# Patient Record
Sex: MALE | Race: AMERICAN INDIAN OR ALASKA NATIVE | Employment: OTHER | URBAN - METROPOLITAN AREA
[De-identification: names, ages, dates, MRNs, and addresses within clinical notes are randomized per-mention and may not be internally consistent; named-entity substitution may affect disease eponyms.]

---

## 2024-05-18 ENCOUNTER — APPOINTMENT (OUTPATIENT)
Dept: CT IMAGING | Age: 69
DRG: 637 | End: 2024-05-18
Payer: MEDICARE

## 2024-05-18 ENCOUNTER — HOSPITAL ENCOUNTER (INPATIENT)
Age: 69
LOS: 3 days | Discharge: LEFT BEFORE DISCHARGE | DRG: 637 | End: 2024-05-21
Attending: STUDENT IN AN ORGANIZED HEALTH CARE EDUCATION/TRAINING PROGRAM | Admitting: INTERNAL MEDICINE
Payer: MEDICARE

## 2024-05-18 ENCOUNTER — APPOINTMENT (OUTPATIENT)
Dept: GENERAL RADIOLOGY | Age: 69
DRG: 637 | End: 2024-05-18
Payer: MEDICARE

## 2024-05-18 DIAGNOSIS — I10 PRIMARY HYPERTENSION: ICD-10-CM

## 2024-05-18 DIAGNOSIS — E10.10 TYPE 1 DIABETES MELLITUS WITH KETOACIDOSIS WITHOUT COMA (HCC): Primary | ICD-10-CM

## 2024-05-18 DIAGNOSIS — R79.89 ELEVATED TROPONIN: ICD-10-CM

## 2024-05-18 LAB
ACANTHOCYTES BLD QL SMEAR: ABNORMAL
ALBUMIN SERPL-MCNC: 3.6 G/DL (ref 3.5–4.6)
ALP SERPL-CCNC: 68 U/L (ref 35–104)
ALT SERPL-CCNC: 11 U/L (ref 0–41)
AMPHET UR QL SCN: NORMAL
ANION GAP SERPL CALCULATED.3IONS-SCNC: 13 MEQ/L (ref 9–15)
ANION GAP SERPL CALCULATED.3IONS-SCNC: 15 MEQ/L (ref 9–15)
ANION GAP SERPL CALCULATED.3IONS-SCNC: 20 MEQ/L (ref 9–15)
ANION GAP SERPL CALCULATED.3IONS-SCNC: 48 MEQ/L (ref 9–15)
ANION GAP SERPL CALCULATED.3IONS-SCNC: 9 MEQ/L (ref 9–15)
ANISOCYTOSIS BLD QL SMEAR: ABNORMAL
AST SERPL-CCNC: 14 U/L (ref 0–40)
B-OH-BUTYR SERPL-SCNC: 25.8 MG/DL (ref 0.2–2.8)
B-OH-BUTYR SERPL-SCNC: 84.8 MG/DL (ref 0.2–2.8)
BACTERIA URNS QL MICRO: ABNORMAL /HPF
BARBITURATES UR QL SCN: NORMAL
BASOPHILS # BLD: 0.3 K/UL (ref 0–0.2)
BASOPHILS NFR BLD: 1 %
BENZODIAZ UR QL SCN: NORMAL
BILIRUB SERPL-MCNC: 0.3 MG/DL (ref 0.2–0.7)
BILIRUB UR QL STRIP: NEGATIVE
BUN SERPL-MCNC: 22 MG/DL (ref 8–23)
BUN SERPL-MCNC: 23 MG/DL (ref 8–23)
BUN SERPL-MCNC: 24 MG/DL (ref 8–23)
BUN SERPL-MCNC: 24 MG/DL (ref 8–23)
BUN SERPL-MCNC: 33 MG/DL (ref 8–23)
BURR CELLS: ABNORMAL
CALCIUM IONIZED: 1.2 MMOL/L (ref 1.12–1.32)
CALCIUM SERPL-MCNC: 8.6 MG/DL (ref 8.5–9.9)
CALCIUM SERPL-MCNC: 8.6 MG/DL (ref 8.5–9.9)
CALCIUM SERPL-MCNC: 8.8 MG/DL (ref 8.5–9.9)
CALCIUM SERPL-MCNC: 8.8 MG/DL (ref 8.5–9.9)
CALCIUM SERPL-MCNC: 9.8 MG/DL (ref 8.5–9.9)
CANNABINOIDS UR QL SCN: NORMAL
CHLORIDE SERPL-SCNC: 107 MEQ/L (ref 95–107)
CHLORIDE SERPL-SCNC: 108 MEQ/L (ref 95–107)
CHLORIDE SERPL-SCNC: 109 MEQ/L (ref 95–107)
CHLORIDE SERPL-SCNC: 111 MEQ/L (ref 95–107)
CHLORIDE SERPL-SCNC: 80 MEQ/L (ref 95–107)
CLARITY UR: CLEAR
CO2 SERPL-SCNC: 16 MEQ/L (ref 20–31)
CO2 SERPL-SCNC: 22 MEQ/L (ref 20–31)
CO2 SERPL-SCNC: 22 MEQ/L (ref 20–31)
CO2 SERPL-SCNC: 24 MEQ/L (ref 20–31)
CO2 SERPL-SCNC: 4 MEQ/L (ref 20–31)
COCAINE UR QL SCN: NORMAL
COLOR UR: YELLOW
CREAT SERPL-MCNC: 1.27 MG/DL (ref 0.7–1.2)
CREAT SERPL-MCNC: 1.46 MG/DL (ref 0.7–1.2)
CREAT SERPL-MCNC: 1.5 MG/DL (ref 0.7–1.2)
CREAT SERPL-MCNC: 1.51 MG/DL (ref 0.7–1.2)
CREAT SERPL-MCNC: 2.13 MG/DL (ref 0.7–1.2)
DRUG SCREEN COMMENT UR-IMP: NORMAL
EOSINOPHIL # BLD: 0 K/UL (ref 0–0.7)
EOSINOPHIL NFR BLD: 0.1 %
EPI CELLS #/AREA URNS HPF: ABNORMAL /HPF
ERYTHROCYTE [DISTWIDTH] IN BLOOD BY AUTOMATED COUNT: 12.2 % (ref 11.5–14.5)
ESTIMATED AVERAGE GLUCOSE: 283 MG/DL
ETHANOL PERCENT: NORMAL G/DL
ETHANOLAMINE SERPL-MCNC: <10 MG/DL (ref 0–0.08)
FENTANYL SCREEN, URINE: NORMAL
GLOBULIN SER CALC-MCNC: 2.4 G/DL (ref 2.3–3.5)
GLUCOSE BLD-MCNC: 102 MG/DL (ref 70–99)
GLUCOSE BLD-MCNC: 106 MG/DL (ref 70–99)
GLUCOSE BLD-MCNC: 146 MG/DL (ref 70–99)
GLUCOSE BLD-MCNC: 208 MG/DL (ref 70–99)
GLUCOSE BLD-MCNC: 319 MG/DL (ref 70–99)
GLUCOSE BLD-MCNC: 357 MG/DL (ref 70–99)
GLUCOSE BLD-MCNC: 373 MG/DL (ref 70–99)
GLUCOSE BLD-MCNC: 504 MG/DL (ref 70–99)
GLUCOSE BLD-MCNC: 589 MG/DL (ref 70–99)
GLUCOSE BLD-MCNC: 92 MG/DL (ref 70–99)
GLUCOSE BLD-MCNC: 96 MG/DL (ref 70–99)
GLUCOSE BLD-MCNC: >600 MG/DL (ref 70–99)
GLUCOSE BLD-MCNC: >700 MG/DL (ref 70–99)
GLUCOSE SERPL-MCNC: 1118 MG/DL (ref 70–99)
GLUCOSE SERPL-MCNC: 1214 MG/DL (ref 70–99)
GLUCOSE SERPL-MCNC: 128 MG/DL (ref 70–99)
GLUCOSE SERPL-MCNC: 303 MG/DL (ref 70–99)
GLUCOSE SERPL-MCNC: 317 MG/DL (ref 70–99)
GLUCOSE SERPL-MCNC: 395 MG/DL (ref 70–99)
GLUCOSE SERPL-MCNC: 993 MG/DL (ref 70–99)
GLUCOSE UR STRIP-MCNC: >=1000 MG/DL
HBA1C MFR BLD: 11.5 % (ref 4–6)
HCT VFR BLD AUTO: 37 % (ref 41–53)
HCT VFR BLD AUTO: 38.7 % (ref 42–52)
HGB BLD CALC-MCNC: 12.7 GM/DL (ref 13.5–17.5)
HGB BLD-MCNC: 11.5 G/DL (ref 14–18)
HGB UR QL STRIP: ABNORMAL
KETONES UR STRIP-MCNC: 15 MG/DL
LACTATE BLDV-SCNC: 10 MMOL/L (ref 0.5–2.2)
LACTATE: 8.41 MMOL/L (ref 0.4–2)
LEUKOCYTE ESTERASE UR QL STRIP: NEGATIVE
LIPASE SERPL-CCNC: 25 U/L (ref 12–95)
LYMPHOCYTES # BLD: 0.9 K/UL (ref 1–4.8)
LYMPHOCYTES NFR BLD: 3 %
MAGNESIUM SERPL-MCNC: 1.5 MG/DL (ref 1.7–2.4)
MAGNESIUM SERPL-MCNC: 1.6 MG/DL (ref 1.7–2.4)
MAGNESIUM SERPL-MCNC: 1.7 MG/DL (ref 1.7–2.4)
MCH RBC QN AUTO: 29.3 PG (ref 27–31.3)
MCHC RBC AUTO-ENTMCNC: 29.7 % (ref 33–37)
MCV RBC AUTO: 98.5 FL (ref 79–92.2)
METHADONE UR QL SCN: NORMAL
MONOCYTES # BLD: 2.1 K/UL (ref 0.2–0.8)
MONOCYTES NFR BLD: 6.7 %
NEUTROPHILS # BLD: 26.5 K/UL (ref 1.4–6.5)
NEUTS BAND NFR BLD MANUAL: 4 %
NEUTS SEG NFR BLD: 86 %
NITRITE UR QL STRIP: NEGATIVE
OPIATES UR QL SCN: NORMAL
OXYCODONE UR QL SCN: NORMAL
PCO2 ARTERIAL: <12 MM HG (ref 35–45)
PCP UR QL SCN: NORMAL
PERFORMED ON: ABNORMAL
PERFORMED ON: NORMAL
PERFORMED ON: NORMAL
PH ARTERIAL: 7.01 (ref 7.35–7.45)
PH UR STRIP: 5 [PH] (ref 5–9)
PHOSPHATE SERPL-MCNC: 1.4 MG/DL (ref 2.3–4.8)
PHOSPHATE SERPL-MCNC: 1.6 MG/DL (ref 2.3–4.8)
PHOSPHATE SERPL-MCNC: 1.9 MG/DL (ref 2.3–4.8)
PLATELET # BLD AUTO: 329 K/UL (ref 130–400)
PLATELET BLD QL SMEAR: ADEQUATE
PO2 ARTERIAL: 145 MM HG (ref 75–108)
POC CHLORIDE: 96 MEQ/L (ref 99–110)
POC CREATININE: 1.6 MG/DL (ref 0.8–1.3)
POC SAMPLE TYPE: ABNORMAL
POTASSIUM SERPL-SCNC: 3.3 MEQ/L (ref 3.4–4.9)
POTASSIUM SERPL-SCNC: 3.4 MEQ/L (ref 3.4–4.9)
POTASSIUM SERPL-SCNC: 3.4 MEQ/L (ref 3.4–4.9)
POTASSIUM SERPL-SCNC: 3.6 MEQ/L (ref 3.4–4.9)
POTASSIUM SERPL-SCNC: 5.5 MEQ/L (ref 3.4–4.9)
POTASSIUM SERPL-SCNC: 5.7 MEQ/L (ref 3.5–5.1)
PROCALCITONIN SERPL IA-MCNC: 1.33 NG/ML (ref 0–0.15)
PROPOXYPH UR QL SCN: NORMAL
PROT SERPL-MCNC: 6 G/DL (ref 6.3–8)
PROT UR STRIP-MCNC: NEGATIVE MG/DL
RBC # BLD AUTO: 3.93 M/UL (ref 4.7–6.1)
RBC #/AREA URNS HPF: ABNORMAL /HPF (ref 0–2)
SLIDE REVIEW: ABNORMAL
SMUDGE CELLS BLD QL SMEAR: 20.2
SODIUM BLD-SCNC: 123 MEQ/L (ref 136–145)
SODIUM SERPL-SCNC: 132 MEQ/L (ref 135–144)
SODIUM SERPL-SCNC: 144 MEQ/L (ref 135–144)
SP GR UR STRIP: 1.02 (ref 1–1.03)
TROPONIN, HIGH SENSITIVITY: 149 NG/L (ref 0–19)
TROPONIN, HIGH SENSITIVITY: 30 NG/L (ref 0–19)
TSH REFLEX: 1.23 UIU/ML (ref 0.44–3.86)
URINE REFLEX TO CULTURE: ABNORMAL
UROBILINOGEN UR STRIP-ACNC: 0.2 E.U./DL
WBC # BLD AUTO: 29.4 K/UL (ref 4.8–10.8)
WBC #/AREA URNS HPF: ABNORMAL /HPF (ref 0–5)

## 2024-05-18 PROCEDURE — 84484 ASSAY OF TROPONIN QUANT: CPT

## 2024-05-18 PROCEDURE — 82077 ASSAY SPEC XCP UR&BREATH IA: CPT

## 2024-05-18 PROCEDURE — 83690 ASSAY OF LIPASE: CPT

## 2024-05-18 PROCEDURE — 36415 COLL VENOUS BLD VENIPUNCTURE: CPT

## 2024-05-18 PROCEDURE — 51702 INSERT TEMP BLADDER CATH: CPT

## 2024-05-18 PROCEDURE — 84145 PROCALCITONIN (PCT): CPT

## 2024-05-18 PROCEDURE — 96365 THER/PROPH/DIAG IV INF INIT: CPT

## 2024-05-18 PROCEDURE — 82330 ASSAY OF CALCIUM: CPT

## 2024-05-18 PROCEDURE — 6360000002 HC RX W HCPCS: Performed by: STUDENT IN AN ORGANIZED HEALTH CARE EDUCATION/TRAINING PROGRAM

## 2024-05-18 PROCEDURE — 82565 ASSAY OF CREATININE: CPT

## 2024-05-18 PROCEDURE — 96368 THER/DIAG CONCURRENT INF: CPT

## 2024-05-18 PROCEDURE — 85014 HEMATOCRIT: CPT

## 2024-05-18 PROCEDURE — 81001 URINALYSIS AUTO W/SCOPE: CPT

## 2024-05-18 PROCEDURE — 6370000000 HC RX 637 (ALT 250 FOR IP): Performed by: INTERNAL MEDICINE

## 2024-05-18 PROCEDURE — 99285 EMERGENCY DEPT VISIT HI MDM: CPT

## 2024-05-18 PROCEDURE — 71045 X-RAY EXAM CHEST 1 VIEW: CPT

## 2024-05-18 PROCEDURE — 2580000003 HC RX 258: Performed by: INTERNAL MEDICINE

## 2024-05-18 PROCEDURE — 2500000003 HC RX 250 WO HCPCS: Performed by: STUDENT IN AN ORGANIZED HEALTH CARE EDUCATION/TRAINING PROGRAM

## 2024-05-18 PROCEDURE — 80307 DRUG TEST PRSMV CHEM ANLYZR: CPT

## 2024-05-18 PROCEDURE — 83735 ASSAY OF MAGNESIUM: CPT

## 2024-05-18 PROCEDURE — 83605 ASSAY OF LACTIC ACID: CPT

## 2024-05-18 PROCEDURE — 2580000003 HC RX 258: Performed by: STUDENT IN AN ORGANIZED HEALTH CARE EDUCATION/TRAINING PROGRAM

## 2024-05-18 PROCEDURE — 2580000003 HC RX 258

## 2024-05-18 PROCEDURE — 82800 BLOOD PH: CPT

## 2024-05-18 PROCEDURE — 84132 ASSAY OF SERUM POTASSIUM: CPT

## 2024-05-18 PROCEDURE — 74177 CT ABD & PELVIS W/CONTRAST: CPT

## 2024-05-18 PROCEDURE — 87040 BLOOD CULTURE FOR BACTERIA: CPT

## 2024-05-18 PROCEDURE — 82435 ASSAY OF BLOOD CHLORIDE: CPT

## 2024-05-18 PROCEDURE — 93005 ELECTROCARDIOGRAM TRACING: CPT | Performed by: STUDENT IN AN ORGANIZED HEALTH CARE EDUCATION/TRAINING PROGRAM

## 2024-05-18 PROCEDURE — 84295 ASSAY OF SERUM SODIUM: CPT

## 2024-05-18 PROCEDURE — 82947 ASSAY GLUCOSE BLOOD QUANT: CPT

## 2024-05-18 PROCEDURE — 2500000003 HC RX 250 WO HCPCS: Performed by: INTERNAL MEDICINE

## 2024-05-18 PROCEDURE — 6370000000 HC RX 637 (ALT 250 FOR IP): Performed by: STUDENT IN AN ORGANIZED HEALTH CARE EDUCATION/TRAINING PROGRAM

## 2024-05-18 PROCEDURE — 80053 COMPREHEN METABOLIC PANEL: CPT

## 2024-05-18 PROCEDURE — 85025 COMPLETE CBC W/AUTO DIFF WBC: CPT

## 2024-05-18 PROCEDURE — 6360000004 HC RX CONTRAST MEDICATION: Performed by: STUDENT IN AN ORGANIZED HEALTH CARE EDUCATION/TRAINING PROGRAM

## 2024-05-18 PROCEDURE — 70450 CT HEAD/BRAIN W/O DYE: CPT

## 2024-05-18 PROCEDURE — 2000000000 HC ICU R&B

## 2024-05-18 PROCEDURE — 84443 ASSAY THYROID STIM HORMONE: CPT

## 2024-05-18 PROCEDURE — 6360000002 HC RX W HCPCS: Performed by: INTERNAL MEDICINE

## 2024-05-18 PROCEDURE — 83036 HEMOGLOBIN GLYCOSYLATED A1C: CPT

## 2024-05-18 PROCEDURE — 82010 KETONE BODYS QUAN: CPT

## 2024-05-18 PROCEDURE — 36600 WITHDRAWAL OF ARTERIAL BLOOD: CPT

## 2024-05-18 PROCEDURE — 84100 ASSAY OF PHOSPHORUS: CPT

## 2024-05-18 PROCEDURE — 6360000002 HC RX W HCPCS: Performed by: EMERGENCY MEDICINE

## 2024-05-18 RX ORDER — SODIUM CHLORIDE 9 MG/ML
INJECTION, SOLUTION INTRAVENOUS CONTINUOUS
Status: DISCONTINUED | OUTPATIENT
Start: 2024-05-18 | End: 2024-05-18

## 2024-05-18 RX ORDER — LISINOPRIL 10 MG/1
10 TABLET ORAL DAILY
Status: DISCONTINUED | OUTPATIENT
Start: 2024-05-19 | End: 2024-05-22 | Stop reason: HOSPADM

## 2024-05-18 RX ORDER — PANTOPRAZOLE SODIUM 40 MG/1
40 TABLET, DELAYED RELEASE ORAL
Status: DISCONTINUED | OUTPATIENT
Start: 2024-05-19 | End: 2024-05-22 | Stop reason: HOSPADM

## 2024-05-18 RX ORDER — INSULIN GLARGINE 100 [IU]/ML
20 INJECTION, SOLUTION SUBCUTANEOUS NIGHTLY
COMMUNITY
Start: 2024-04-24

## 2024-05-18 RX ORDER — DEXTROSE AND SODIUM CHLORIDE 5; .45 G/100ML; G/100ML
INJECTION, SOLUTION INTRAVENOUS CONTINUOUS PRN
Status: DISCONTINUED | OUTPATIENT
Start: 2024-05-18 | End: 2024-05-19

## 2024-05-18 RX ORDER — ONDANSETRON 2 MG/ML
4 INJECTION INTRAMUSCULAR; INTRAVENOUS ONCE
Status: COMPLETED | OUTPATIENT
Start: 2024-05-18 | End: 2024-05-18

## 2024-05-18 RX ORDER — LEVOTHYROXINE SODIUM 0.15 MG/1
1 TABLET ORAL DAILY
COMMUNITY
Start: 2024-04-24

## 2024-05-18 RX ORDER — ATORVASTATIN CALCIUM 20 MG/1
20 TABLET, FILM COATED ORAL NIGHTLY
COMMUNITY
Start: 2024-04-24

## 2024-05-18 RX ORDER — GABAPENTIN 300 MG/1
300 CAPSULE ORAL 3 TIMES DAILY
COMMUNITY
Start: 2024-04-24 | End: 2024-07-23

## 2024-05-18 RX ORDER — DEXTROSE MONOHYDRATE, SODIUM CHLORIDE, AND POTASSIUM CHLORIDE 50; 1.49; 4.5 G/1000ML; G/1000ML; G/1000ML
INJECTION, SOLUTION INTRAVENOUS CONTINUOUS PRN
Status: DISCONTINUED | OUTPATIENT
Start: 2024-05-18 | End: 2024-05-19

## 2024-05-18 RX ORDER — ASPIRIN 81 MG/1
81 TABLET ORAL DAILY
Status: DISCONTINUED | OUTPATIENT
Start: 2024-05-19 | End: 2024-05-22 | Stop reason: HOSPADM

## 2024-05-18 RX ORDER — ASPIRIN 81 MG/1
81 TABLET ORAL DAILY
COMMUNITY
Start: 2006-01-24

## 2024-05-18 RX ORDER — POTASSIUM CHLORIDE 7.45 MG/ML
10 INJECTION INTRAVENOUS PRN
Status: DISCONTINUED | OUTPATIENT
Start: 2024-05-18 | End: 2024-05-19

## 2024-05-18 RX ORDER — ONDANSETRON 2 MG/ML
4 INJECTION INTRAMUSCULAR; INTRAVENOUS EVERY 6 HOURS PRN
Status: DISCONTINUED | OUTPATIENT
Start: 2024-05-18 | End: 2024-05-22 | Stop reason: HOSPADM

## 2024-05-18 RX ORDER — LEVOTHYROXINE SODIUM 0.15 MG/1
150 TABLET ORAL DAILY
Status: DISCONTINUED | OUTPATIENT
Start: 2024-05-19 | End: 2024-05-22 | Stop reason: HOSPADM

## 2024-05-18 RX ORDER — HEPARIN SODIUM 5000 [USP'U]/ML
5000 INJECTION, SOLUTION INTRAVENOUS; SUBCUTANEOUS EVERY 8 HOURS SCHEDULED
Status: DISCONTINUED | OUTPATIENT
Start: 2024-05-18 | End: 2024-05-20 | Stop reason: DRUGHIGH

## 2024-05-18 RX ORDER — SODIUM CHLORIDE 9 MG/ML
INJECTION, SOLUTION INTRAVENOUS
Status: COMPLETED
Start: 2024-05-18 | End: 2024-05-18

## 2024-05-18 RX ORDER — MAGNESIUM SULFATE IN WATER 40 MG/ML
2000 INJECTION, SOLUTION INTRAVENOUS PRN
Status: DISCONTINUED | OUTPATIENT
Start: 2024-05-18 | End: 2024-05-19

## 2024-05-18 RX ORDER — ONDANSETRON 2 MG/ML
4 INJECTION INTRAMUSCULAR; INTRAVENOUS EVERY 6 HOURS PRN
Status: DISCONTINUED | OUTPATIENT
Start: 2024-05-18 | End: 2024-05-18 | Stop reason: SDUPTHER

## 2024-05-18 RX ORDER — 0.9 % SODIUM CHLORIDE 0.9 %
2000 INTRAVENOUS SOLUTION INTRAVENOUS ONCE
Status: COMPLETED | OUTPATIENT
Start: 2024-05-18 | End: 2024-05-18

## 2024-05-18 RX ORDER — DILTIAZEM HYDROCHLORIDE 180 MG/1
180 CAPSULE, COATED, EXTENDED RELEASE ORAL DAILY
Status: DISCONTINUED | OUTPATIENT
Start: 2024-05-19 | End: 2024-05-22 | Stop reason: HOSPADM

## 2024-05-18 RX ORDER — GABAPENTIN 300 MG/1
300 CAPSULE ORAL 3 TIMES DAILY
Status: DISCONTINUED | OUTPATIENT
Start: 2024-05-18 | End: 2024-05-20

## 2024-05-18 RX ORDER — LISINOPRIL 10 MG/1
10 TABLET ORAL DAILY
COMMUNITY
Start: 2024-04-24

## 2024-05-18 RX ORDER — DILTIAZEM HYDROCHLORIDE 180 MG/1
180 CAPSULE, EXTENDED RELEASE ORAL DAILY
COMMUNITY
Start: 2024-04-24 | End: 2024-07-23

## 2024-05-18 RX ORDER — SODIUM CHLORIDE 9 MG/ML
INJECTION, SOLUTION INTRAVENOUS CONTINUOUS
Status: DISCONTINUED | OUTPATIENT
Start: 2024-05-18 | End: 2024-05-19

## 2024-05-18 RX ORDER — ATORVASTATIN CALCIUM 20 MG/1
20 TABLET, FILM COATED ORAL NIGHTLY
Status: DISCONTINUED | OUTPATIENT
Start: 2024-05-18 | End: 2024-05-22 | Stop reason: HOSPADM

## 2024-05-18 RX ORDER — 0.9 % SODIUM CHLORIDE 0.9 %
1000 INTRAVENOUS SOLUTION INTRAVENOUS ONCE
Status: COMPLETED | OUTPATIENT
Start: 2024-05-18 | End: 2024-05-18

## 2024-05-18 RX ADMIN — IOPAMIDOL 75 ML: 755 INJECTION, SOLUTION INTRAVENOUS at 05:07

## 2024-05-18 RX ADMIN — GABAPENTIN 300 MG: 300 CAPSULE ORAL at 21:28

## 2024-05-18 RX ADMIN — POTASSIUM CHLORIDE 10 MEQ: 7.46 INJECTION, SOLUTION INTRAVENOUS at 16:38

## 2024-05-18 RX ADMIN — SODIUM CHLORIDE 1000 ML: 9 INJECTION, SOLUTION INTRAVENOUS at 05:59

## 2024-05-18 RX ADMIN — ONDANSETRON 4 MG: 2 INJECTION INTRAMUSCULAR; INTRAVENOUS at 15:25

## 2024-05-18 RX ADMIN — SODIUM CHLORIDE 1.4 UNITS/HR: 9 INJECTION, SOLUTION INTRAVENOUS at 20:17

## 2024-05-18 RX ADMIN — SODIUM CHLORIDE 2000 ML: 9 INJECTION, SOLUTION INTRAVENOUS at 02:48

## 2024-05-18 RX ADMIN — ONDANSETRON 4 MG: 2 INJECTION INTRAMUSCULAR; INTRAVENOUS at 10:34

## 2024-05-18 RX ADMIN — MAGNESIUM SULFATE HEPTAHYDRATE 2000 MG: 40 INJECTION, SOLUTION INTRAVENOUS at 22:06

## 2024-05-18 RX ADMIN — HEPARIN SODIUM 5000 UNITS: 5000 INJECTION INTRAVENOUS; SUBCUTANEOUS at 21:28

## 2024-05-18 RX ADMIN — POTASSIUM CHLORIDE 10 MEQ: 7.46 INJECTION, SOLUTION INTRAVENOUS at 17:30

## 2024-05-18 RX ADMIN — POTASSIUM CHLORIDE 10 MEQ: 7.46 INJECTION, SOLUTION INTRAVENOUS at 23:56

## 2024-05-18 RX ADMIN — POTASSIUM CHLORIDE, DEXTROSE MONOHYDRATE AND SODIUM CHLORIDE 150 ML/HR: 150; 5; 450 INJECTION, SOLUTION INTRAVENOUS at 23:57

## 2024-05-18 RX ADMIN — SODIUM CHLORIDE: 9 INJECTION, SOLUTION INTRAVENOUS at 13:24

## 2024-05-18 RX ADMIN — SODIUM CHLORIDE 14.9 UNITS/HR: 9 INJECTION, SOLUTION INTRAVENOUS at 14:21

## 2024-05-18 RX ADMIN — PIPERACILLIN AND TAZOBACTAM 3375 MG: 3; .375 INJECTION, POWDER, FOR SOLUTION INTRAVENOUS at 06:03

## 2024-05-18 RX ADMIN — POTASSIUM CHLORIDE 10 MEQ: 7.46 INJECTION, SOLUTION INTRAVENOUS at 18:30

## 2024-05-18 RX ADMIN — SODIUM CHLORIDE 1500 MG: 9 INJECTION, SOLUTION INTRAVENOUS at 07:12

## 2024-05-18 RX ADMIN — SODIUM CHLORIDE 18.8 UNITS/HR: 9 INJECTION, SOLUTION INTRAVENOUS at 15:30

## 2024-05-18 RX ADMIN — SODIUM PHOSPHATE, MONOBASIC, MONOHYDRATE AND SODIUM PHOSPHATE, DIBASIC, ANHYDROUS 15 MMOL: 142; 276 INJECTION, SOLUTION INTRAVENOUS at 23:37

## 2024-05-18 RX ADMIN — HEPARIN SODIUM 5000 UNITS: 5000 INJECTION INTRAVENOUS; SUBCUTANEOUS at 16:42

## 2024-05-18 RX ADMIN — SODIUM CHLORIDE: 9 INJECTION, SOLUTION INTRAVENOUS at 02:57

## 2024-05-18 RX ADMIN — ATORVASTATIN CALCIUM 20 MG: 20 TABLET, FILM COATED ORAL at 21:28

## 2024-05-18 RX ADMIN — CALCIUM GLUCONATE 2000 MG: 98 INJECTION, SOLUTION INTRAVENOUS at 02:58

## 2024-05-18 RX ADMIN — POTASSIUM CHLORIDE, DEXTROSE MONOHYDRATE AND SODIUM CHLORIDE: 150; 5; 450 INJECTION, SOLUTION INTRAVENOUS at 17:48

## 2024-05-18 RX ADMIN — SODIUM CHLORIDE: 9 INJECTION, SOLUTION INTRAVENOUS at 07:17

## 2024-05-18 RX ADMIN — POTASSIUM CHLORIDE 10 MEQ: 7.46 INJECTION, SOLUTION INTRAVENOUS at 22:02

## 2024-05-18 RX ADMIN — SODIUM CHLORIDE 10.8 UNITS/HR: 9 INJECTION, SOLUTION INTRAVENOUS at 03:20

## 2024-05-18 RX ADMIN — POTASSIUM CHLORIDE 10 MEQ: 7.46 INJECTION, SOLUTION INTRAVENOUS at 22:49

## 2024-05-18 RX ADMIN — Medication 2000 ML: at 02:48

## 2024-05-18 ASSESSMENT — PAIN SCALES - GENERAL
PAINLEVEL_OUTOF10: 0

## 2024-05-18 NOTE — ED NOTES
Pt removed nasal cannula, refusing to wear it, states I just want to sleep. SPO2 100 RA, O2 removed at this time

## 2024-05-18 NOTE — ED TRIAGE NOTES
Pt arrived via EMS from home, per son altered mental status. Hx of DM. LKW 2100. Pt restless upon arrival. Given en route 1L NS, 1g Ca, 2.5mg x2 albuterol treatment.

## 2024-05-18 NOTE — ED PROVIDER NOTES
condition, pulse oximetry and review of old charts    I assumed direction of critical care for this patient from another provider in my specialty: no      Care discussed with: admitting provider             FINAL IMPRESSION      1. Type 1 diabetes mellitus with ketoacidosis without coma (HCC)          DISPOSITION/PLAN   DISPOSITION Admitted 05/18/2024 05:52:02 AM      PATIENT REFERRED TO:  No follow-up provider specified.    DISCHARGE MEDICATIONS:  New Prescriptions    No medications on file     Controlled Substances Monitoring:          No data to display                (Please note that portions of this note were completed with a voice recognition program.  Efforts were made to edit the dictations but occasionally words are mis-transcribed.)    Mary iRos MD (electronically signed)  Attending Emergency Physician            Mary Rios MD  05/18/24 0619

## 2024-05-18 NOTE — FLOWSHEET NOTE
1500- patient here in icu room 15. Patient able to answer admission questions. Patient on insulin gtt.

## 2024-05-18 NOTE — H&P
DEPARTMENT OF HOSPITAL MEDICINE    HISTORY AND PHYSICAL EXAM    PATIENT NAME:  Herminio Olguin    MRN:  [id number]  SERVICE DATE:  5/18/2024   SERVICE TIME:  5:52 AM    Primary Care Physician: Joelle Campuzano MD     SUBJECTIVE  CHIEF COMPLAINT: AMS    HPI:  Herminio Olguin is a 69 y.o. male who presents with above complaints.    Patient is a 69-year-old male brought in from home by EMS after his son found him to be confused and not acting normally at home.  Patient is a type I diabetic, not previously seen at this facility.  Review of available outside records in care everywhere seems to indicate he does have prior episodes of DKA.  Initial BG in ED higher than upper limit of detection at >700, with eventual initial laboratory value of 1118.  Patient started on fluids and insulin per DKA protocol while still in the ED.  He was also found to have significant JESSICA, clinical dehydration, and metabolic encephalopathy.  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  Repeat TSH performed in the ED at this time is WNL.  Patient is moderately persistently encephalopathic, and currently lacks medical decision-making capacity.  His adult son is present at bedside, and reportedly is next of kin and medical decision maker in this setting.  Adult son reports that he was away from home, came back today and found him the patient severely altered.  Unclear last known well.  Patient reportedly does not use an insulin pump at home.  He is able to answer very limited questions during the course of the admission interview, and denies using insulin pump or subcutaneous abdominal shots, states that he takes his shot \"in his arm\", and motions to his right deltoid.  He endorses severe thirst.  He

## 2024-05-19 LAB
ANION GAP SERPL CALCULATED.3IONS-SCNC: 10 MEQ/L (ref 9–15)
ANION GAP SERPL CALCULATED.3IONS-SCNC: 12 MEQ/L (ref 9–15)
ANION GAP SERPL CALCULATED.3IONS-SCNC: 13 MEQ/L (ref 9–15)
ANION GAP SERPL CALCULATED.3IONS-SCNC: 13 MEQ/L (ref 9–15)
BASOPHILS # BLD: 0 K/UL (ref 0–0.2)
BASOPHILS NFR BLD: 0.2 %
BUN SERPL-MCNC: 14 MG/DL (ref 8–23)
BUN SERPL-MCNC: 16 MG/DL (ref 8–23)
BUN SERPL-MCNC: 18 MG/DL (ref 8–23)
BUN SERPL-MCNC: 20 MG/DL (ref 8–23)
CALCIUM SERPL-MCNC: 8 MG/DL (ref 8.5–9.9)
CALCIUM SERPL-MCNC: 8.1 MG/DL (ref 8.5–9.9)
CALCIUM SERPL-MCNC: 8.3 MG/DL (ref 8.5–9.9)
CALCIUM SERPL-MCNC: 8.3 MG/DL (ref 8.5–9.9)
CHLORIDE SERPL-SCNC: 106 MEQ/L (ref 95–107)
CHLORIDE SERPL-SCNC: 106 MEQ/L (ref 95–107)
CHLORIDE SERPL-SCNC: 108 MEQ/L (ref 95–107)
CHLORIDE SERPL-SCNC: 108 MEQ/L (ref 95–107)
CO2 SERPL-SCNC: 19 MEQ/L (ref 20–31)
CO2 SERPL-SCNC: 19 MEQ/L (ref 20–31)
CO2 SERPL-SCNC: 21 MEQ/L (ref 20–31)
CO2 SERPL-SCNC: 22 MEQ/L (ref 20–31)
CREAT SERPL-MCNC: 0.84 MG/DL (ref 0.7–1.2)
CREAT SERPL-MCNC: 0.91 MG/DL (ref 0.7–1.2)
CREAT SERPL-MCNC: 0.95 MG/DL (ref 0.7–1.2)
CREAT SERPL-MCNC: 1 MG/DL (ref 0.7–1.2)
EOSINOPHIL # BLD: 0 K/UL (ref 0–0.7)
EOSINOPHIL NFR BLD: 0 %
ERYTHROCYTE [DISTWIDTH] IN BLOOD BY AUTOMATED COUNT: 13.2 % (ref 11.5–14.5)
GLUCOSE BLD-MCNC: 118 MG/DL (ref 70–99)
GLUCOSE BLD-MCNC: 125 MG/DL (ref 70–99)
GLUCOSE BLD-MCNC: 131 MG/DL (ref 70–99)
GLUCOSE BLD-MCNC: 135 MG/DL (ref 70–99)
GLUCOSE BLD-MCNC: 139 MG/DL (ref 70–99)
GLUCOSE BLD-MCNC: 145 MG/DL (ref 70–99)
GLUCOSE BLD-MCNC: 192 MG/DL (ref 70–99)
GLUCOSE BLD-MCNC: 215 MG/DL (ref 70–99)
GLUCOSE BLD-MCNC: 224 MG/DL (ref 70–99)
GLUCOSE BLD-MCNC: 242 MG/DL (ref 70–99)
GLUCOSE BLD-MCNC: 243 MG/DL (ref 70–99)
GLUCOSE BLD-MCNC: 262 MG/DL (ref 70–99)
GLUCOSE BLD-MCNC: 71 MG/DL (ref 70–99)
GLUCOSE BLD-MCNC: 92 MG/DL (ref 70–99)
GLUCOSE SERPL-MCNC: 143 MG/DL (ref 70–99)
GLUCOSE SERPL-MCNC: 152 MG/DL (ref 70–99)
GLUCOSE SERPL-MCNC: 215 MG/DL (ref 70–99)
GLUCOSE SERPL-MCNC: 252 MG/DL (ref 70–99)
HCT VFR BLD AUTO: 40.6 % (ref 42–52)
HGB BLD-MCNC: 13.6 G/DL (ref 14–18)
LYMPHOCYTES # BLD: 1.1 K/UL (ref 1–4.8)
LYMPHOCYTES NFR BLD: 5.4 %
MAGNESIUM SERPL-MCNC: 1.7 MG/DL (ref 1.7–2.4)
MAGNESIUM SERPL-MCNC: 1.8 MG/DL (ref 1.7–2.4)
MCH RBC QN AUTO: 28.2 PG (ref 27–31.3)
MCHC RBC AUTO-ENTMCNC: 33.5 % (ref 33–37)
MCV RBC AUTO: 84.1 FL (ref 79–92.2)
MONOCYTES # BLD: 1.1 K/UL (ref 0.2–0.8)
MONOCYTES NFR BLD: 5.8 %
NEUTROPHILS # BLD: 17.1 K/UL (ref 1.4–6.5)
NEUTS SEG NFR BLD: 87.8 %
PERFORMED ON: ABNORMAL
PERFORMED ON: NORMAL
PERFORMED ON: NORMAL
PHOSPHATE SERPL-MCNC: 2 MG/DL (ref 2.3–4.8)
PHOSPHATE SERPL-MCNC: 2.5 MG/DL (ref 2.3–4.8)
PHOSPHATE SERPL-MCNC: 2.8 MG/DL (ref 2.3–4.8)
PHOSPHATE SERPL-MCNC: 3.1 MG/DL (ref 2.3–4.8)
PLATELET # BLD AUTO: 147 K/UL (ref 130–400)
POTASSIUM SERPL-SCNC: 4 MEQ/L (ref 3.4–4.9)
POTASSIUM SERPL-SCNC: 4.1 MEQ/L (ref 3.4–4.9)
POTASSIUM SERPL-SCNC: 4.4 MEQ/L (ref 3.4–4.9)
POTASSIUM SERPL-SCNC: 4.6 MEQ/L (ref 3.4–4.9)
POTASSIUM SERPL-SCNC: 5.3 MEQ/L (ref 3.4–4.9)
RBC # BLD AUTO: 4.83 M/UL (ref 4.7–6.1)
REASON FOR REJECTION: NORMAL
REASON FOR REJECTION: NORMAL
REJECTED TEST: NORMAL
REJECTED TEST: NORMAL
SODIUM SERPL-SCNC: 137 MEQ/L (ref 135–144)
SODIUM SERPL-SCNC: 138 MEQ/L (ref 135–144)
SODIUM SERPL-SCNC: 140 MEQ/L (ref 135–144)
SODIUM SERPL-SCNC: 142 MEQ/L (ref 135–144)
WBC # BLD AUTO: 19.5 K/UL (ref 4.8–10.8)

## 2024-05-19 PROCEDURE — 6370000000 HC RX 637 (ALT 250 FOR IP): Performed by: INTERNAL MEDICINE

## 2024-05-19 PROCEDURE — 83735 ASSAY OF MAGNESIUM: CPT

## 2024-05-19 PROCEDURE — 6360000002 HC RX W HCPCS: Performed by: INTERNAL MEDICINE

## 2024-05-19 PROCEDURE — 80048 BASIC METABOLIC PNL TOTAL CA: CPT

## 2024-05-19 PROCEDURE — 36415 COLL VENOUS BLD VENIPUNCTURE: CPT

## 2024-05-19 PROCEDURE — 6360000002 HC RX W HCPCS: Performed by: FAMILY MEDICINE

## 2024-05-19 PROCEDURE — 84100 ASSAY OF PHOSPHORUS: CPT

## 2024-05-19 PROCEDURE — 2580000003 HC RX 258: Performed by: FAMILY MEDICINE

## 2024-05-19 PROCEDURE — 84132 ASSAY OF SERUM POTASSIUM: CPT

## 2024-05-19 PROCEDURE — 2500000003 HC RX 250 WO HCPCS: Performed by: INTERNAL MEDICINE

## 2024-05-19 PROCEDURE — 93005 ELECTROCARDIOGRAM TRACING: CPT | Performed by: INTERNAL MEDICINE

## 2024-05-19 PROCEDURE — 2000000000 HC ICU R&B

## 2024-05-19 PROCEDURE — 6370000000 HC RX 637 (ALT 250 FOR IP): Performed by: FAMILY MEDICINE

## 2024-05-19 PROCEDURE — 2580000003 HC RX 258: Performed by: INTERNAL MEDICINE

## 2024-05-19 PROCEDURE — 99223 1ST HOSP IP/OBS HIGH 75: CPT | Performed by: INTERNAL MEDICINE

## 2024-05-19 PROCEDURE — 2700000000 HC OXYGEN THERAPY PER DAY

## 2024-05-19 PROCEDURE — 85025 COMPLETE CBC W/AUTO DIFF WBC: CPT

## 2024-05-19 RX ORDER — THIAMINE HYDROCHLORIDE 100 MG/ML
100 INJECTION, SOLUTION INTRAMUSCULAR; INTRAVENOUS DAILY
Status: DISCONTINUED | OUTPATIENT
Start: 2024-05-19 | End: 2024-05-22 | Stop reason: HOSPADM

## 2024-05-19 RX ORDER — LORAZEPAM 1 MG/1
3 TABLET ORAL
Status: DISCONTINUED | OUTPATIENT
Start: 2024-05-19 | End: 2024-05-20

## 2024-05-19 RX ORDER — DEXTROSE MONOHYDRATE 100 MG/ML
INJECTION, SOLUTION INTRAVENOUS CONTINUOUS PRN
Status: DISCONTINUED | OUTPATIENT
Start: 2024-05-19 | End: 2024-05-22 | Stop reason: HOSPADM

## 2024-05-19 RX ORDER — MULTIVITAMIN WITH IRON
1 TABLET ORAL DAILY
Status: DISCONTINUED | OUTPATIENT
Start: 2024-05-19 | End: 2024-05-22 | Stop reason: HOSPADM

## 2024-05-19 RX ORDER — SODIUM CHLORIDE, SODIUM LACTATE, POTASSIUM CHLORIDE, CALCIUM CHLORIDE 600; 310; 30; 20 MG/100ML; MG/100ML; MG/100ML; MG/100ML
INJECTION, SOLUTION INTRAVENOUS CONTINUOUS
Status: DISCONTINUED | OUTPATIENT
Start: 2024-05-19 | End: 2024-05-22 | Stop reason: HOSPADM

## 2024-05-19 RX ORDER — LORAZEPAM 1 MG/1
2 TABLET ORAL
Status: DISCONTINUED | OUTPATIENT
Start: 2024-05-19 | End: 2024-05-20

## 2024-05-19 RX ORDER — LORAZEPAM 2 MG/ML
1 INJECTION INTRAMUSCULAR
Status: DISCONTINUED | OUTPATIENT
Start: 2024-05-19 | End: 2024-05-20

## 2024-05-19 RX ORDER — GLUCAGON 1 MG/ML
1 KIT INJECTION PRN
Status: DISCONTINUED | OUTPATIENT
Start: 2024-05-19 | End: 2024-05-22 | Stop reason: HOSPADM

## 2024-05-19 RX ORDER — SODIUM CHLORIDE 9 MG/ML
INJECTION, SOLUTION INTRAVENOUS PRN
Status: DISCONTINUED | OUTPATIENT
Start: 2024-05-19 | End: 2024-05-22 | Stop reason: HOSPADM

## 2024-05-19 RX ORDER — LORAZEPAM 2 MG/ML
4 INJECTION INTRAMUSCULAR
Status: DISCONTINUED | OUTPATIENT
Start: 2024-05-19 | End: 2024-05-20

## 2024-05-19 RX ORDER — SODIUM CHLORIDE 0.9 % (FLUSH) 0.9 %
5-40 SYRINGE (ML) INJECTION PRN
Status: DISCONTINUED | OUTPATIENT
Start: 2024-05-19 | End: 2024-05-22 | Stop reason: HOSPADM

## 2024-05-19 RX ORDER — INSULIN GLARGINE 100 [IU]/ML
20 INJECTION, SOLUTION SUBCUTANEOUS DAILY
Status: DISCONTINUED | OUTPATIENT
Start: 2024-05-19 | End: 2024-05-21

## 2024-05-19 RX ORDER — LORAZEPAM 1 MG/1
1 TABLET ORAL
Status: DISCONTINUED | OUTPATIENT
Start: 2024-05-19 | End: 2024-05-20

## 2024-05-19 RX ORDER — INSULIN LISPRO 100 [IU]/ML
0-16 INJECTION, SOLUTION INTRAVENOUS; SUBCUTANEOUS
Status: DISCONTINUED | OUTPATIENT
Start: 2024-05-19 | End: 2024-05-20

## 2024-05-19 RX ORDER — SODIUM CHLORIDE 0.9 % (FLUSH) 0.9 %
5-40 SYRINGE (ML) INJECTION EVERY 12 HOURS SCHEDULED
Status: DISCONTINUED | OUTPATIENT
Start: 2024-05-19 | End: 2024-05-22 | Stop reason: HOSPADM

## 2024-05-19 RX ORDER — LORAZEPAM 2 MG/ML
2 INJECTION INTRAMUSCULAR
Status: DISCONTINUED | OUTPATIENT
Start: 2024-05-19 | End: 2024-05-20

## 2024-05-19 RX ORDER — LORAZEPAM 2 MG/ML
3 INJECTION INTRAMUSCULAR
Status: DISCONTINUED | OUTPATIENT
Start: 2024-05-19 | End: 2024-05-20

## 2024-05-19 RX ORDER — DEXTROSE MONOHYDRATE 25 G/50ML
12.5 INJECTION, SOLUTION INTRAVENOUS ONCE
Status: COMPLETED | OUTPATIENT
Start: 2024-05-19 | End: 2024-05-19

## 2024-05-19 RX ORDER — LORAZEPAM 1 MG/1
4 TABLET ORAL
Status: DISCONTINUED | OUTPATIENT
Start: 2024-05-19 | End: 2024-05-20

## 2024-05-19 RX ORDER — INSULIN LISPRO 100 [IU]/ML
0-4 INJECTION, SOLUTION INTRAVENOUS; SUBCUTANEOUS NIGHTLY
Status: DISCONTINUED | OUTPATIENT
Start: 2024-05-19 | End: 2024-05-20

## 2024-05-19 RX ADMIN — GABAPENTIN 300 MG: 300 CAPSULE ORAL at 09:07

## 2024-05-19 RX ADMIN — DEXTROSE MONOHYDRATE 12.5 G: 25 INJECTION, SOLUTION INTRAVENOUS at 14:02

## 2024-05-19 RX ADMIN — PANTOPRAZOLE SODIUM 40 MG: 40 TABLET, DELAYED RELEASE ORAL at 06:20

## 2024-05-19 RX ADMIN — SODIUM CHLORIDE, POTASSIUM CHLORIDE, SODIUM LACTATE AND CALCIUM CHLORIDE 75 ML/HR: 600; 310; 30; 20 INJECTION, SOLUTION INTRAVENOUS at 23:16

## 2024-05-19 RX ADMIN — HEPARIN SODIUM 5000 UNITS: 5000 INJECTION INTRAVENOUS; SUBCUTANEOUS at 17:49

## 2024-05-19 RX ADMIN — ASPIRIN 81 MG: 81 TABLET, COATED ORAL at 09:07

## 2024-05-19 RX ADMIN — Medication 3 MG: at 19:43

## 2024-05-19 RX ADMIN — THIAMINE HYDROCHLORIDE 100 MG: 100 INJECTION, SOLUTION INTRAMUSCULAR; INTRAVENOUS at 17:49

## 2024-05-19 RX ADMIN — LISINOPRIL 10 MG: 10 TABLET ORAL at 09:06

## 2024-05-19 RX ADMIN — HEPARIN SODIUM 5000 UNITS: 5000 INJECTION INTRAVENOUS; SUBCUTANEOUS at 06:20

## 2024-05-19 RX ADMIN — POTASSIUM CHLORIDE, DEXTROSE MONOHYDRATE AND SODIUM CHLORIDE 150 ML/HR: 150; 5; 450 INJECTION, SOLUTION INTRAVENOUS at 06:19

## 2024-05-19 RX ADMIN — LEVOTHYROXINE SODIUM 150 MCG: 0.15 TABLET ORAL at 09:07

## 2024-05-19 RX ADMIN — DILTIAZEM HYDROCHLORIDE 180 MG: 180 CAPSULE, COATED, EXTENDED RELEASE ORAL at 09:07

## 2024-05-19 RX ADMIN — INSULIN LISPRO 4 UNITS: 100 INJECTION, SOLUTION INTRAVENOUS; SUBCUTANEOUS at 17:52

## 2024-05-19 RX ADMIN — HEPARIN SODIUM 5000 UNITS: 5000 INJECTION INTRAVENOUS; SUBCUTANEOUS at 23:17

## 2024-05-19 RX ADMIN — INSULIN GLARGINE 20 UNITS: 100 INJECTION, SOLUTION SUBCUTANEOUS at 11:20

## 2024-05-19 RX ADMIN — THERA TABS 1 TABLET: TAB at 17:49

## 2024-05-19 RX ADMIN — SODIUM CHLORIDE, POTASSIUM CHLORIDE, SODIUM LACTATE AND CALCIUM CHLORIDE 75 ML/HR: 600; 310; 30; 20 INJECTION, SOLUTION INTRAVENOUS at 23:24

## 2024-05-19 RX ADMIN — SODIUM CHLORIDE, PRESERVATIVE FREE 10 ML: 5 INJECTION INTRAVENOUS at 19:44

## 2024-05-19 ASSESSMENT — PAIN SCALES - GENERAL: PAINLEVEL_OUTOF10: 0

## 2024-05-19 NOTE — ACP (ADVANCE CARE PLANNING)
Advance Care Planning     Advance Care Planning Activator (Inpatient)  Conversation Note      Date of ACP Conversation: 5/18/2024     Conversation Conducted with: Patient with Decision Making Capacity    ACP Activator: Zoila Haas, RN        Health Care Decision Maker:     Current Designated Health Care Decision Maker:     Primary Decision Maker: Octaviano Olguin - Child - [phone]

## 2024-05-19 NOTE — FLOWSHEET NOTE
Shift summary: Assessment initiated and documented.  Son at bedside.  Insulin drip as documented.  Denies pain.  No nausea this shift.  Tolerating fluids well.

## 2024-05-20 ENCOUNTER — HOSPITAL ENCOUNTER (INPATIENT)
Age: 69
Discharge: HOME | DRG: 637 | End: 2024-05-22
Attending: INTERNAL MEDICINE
Payer: MEDICARE

## 2024-05-20 VITALS
WEIGHT: 165 LBS | SYSTOLIC BLOOD PRESSURE: 124 MMHG | DIASTOLIC BLOOD PRESSURE: 62 MMHG | BODY MASS INDEX: 25.9 KG/M2 | HEIGHT: 67 IN

## 2024-05-20 LAB
ANION GAP SERPL CALCULATED.3IONS-SCNC: 10 MEQ/L (ref 9–15)
ANION GAP SERPL CALCULATED.3IONS-SCNC: 10 MEQ/L (ref 9–15)
ANION GAP SERPL CALCULATED.3IONS-SCNC: 8 MEQ/L (ref 9–15)
BASOPHILS # BLD: 0 K/UL (ref 0–0.2)
BASOPHILS NFR BLD: 0.1 %
BUN SERPL-MCNC: 11 MG/DL (ref 8–23)
BUN SERPL-MCNC: 12 MG/DL (ref 8–23)
BUN SERPL-MCNC: 12 MG/DL (ref 8–23)
CALCIUM SERPL-MCNC: 8.2 MG/DL (ref 8.5–9.9)
CALCIUM SERPL-MCNC: 8.4 MG/DL (ref 8.5–9.9)
CALCIUM SERPL-MCNC: 8.5 MG/DL (ref 8.5–9.9)
CHLORIDE SERPL-SCNC: 103 MEQ/L (ref 95–107)
CHLORIDE SERPL-SCNC: 105 MEQ/L (ref 95–107)
CHLORIDE SERPL-SCNC: 105 MEQ/L (ref 95–107)
CO2 SERPL-SCNC: 23 MEQ/L (ref 20–31)
CO2 SERPL-SCNC: 24 MEQ/L (ref 20–31)
CO2 SERPL-SCNC: 27 MEQ/L (ref 20–31)
CREAT SERPL-MCNC: 0.8 MG/DL (ref 0.7–1.2)
CREAT SERPL-MCNC: 0.8 MG/DL (ref 0.7–1.2)
CREAT SERPL-MCNC: 0.83 MG/DL (ref 0.7–1.2)
ECHO AO ROOT DIAM: 3.2 CM
ECHO AO ROOT INDEX: 1.72 CM/M2
ECHO AV AREA PEAK VELOCITY: 2.3 CM2
ECHO AV AREA PEAK VELOCITY: 2.5 CM2
ECHO AV AREA PEAK VELOCITY: 3 CM2
ECHO AV AREA PEAK VELOCITY: 3.4 CM2
ECHO AV AREA VTI: 4.3 CM2
ECHO AV AREA/BSA VTI: 2.3 CM2/M2
ECHO AV CUSP MM: 2.2 CM
ECHO AV MEAN GRADIENT: 3 MMHG
ECHO AV MEAN VELOCITY: 0.8 M/S
ECHO AV PEAK GRADIENT: 4 MMHG
ECHO AV PEAK GRADIENT: 4 MMHG
ECHO AV PEAK VELOCITY: 1.1 M/S
ECHO AV PEAK VELOCITY: 1.2 M/S
ECHO AV VTI: 19 CM
ECHO BSA: 1.88 M2
ECHO EST RA PRESSURE: 8 MMHG
ECHO LA DIAMETER INDEX: 1.77 CM/M2
ECHO LA DIAMETER: 3.3 CM
ECHO LA TO AORTIC ROOT RATIO: 1.03
ECHO LA VOL A-L A2C: 43 ML (ref 18–58)
ECHO LA VOL A-L A4C: 52 ML (ref 18–58)
ECHO LA VOL MOD A2C: 41 ML (ref 18–58)
ECHO LA VOL MOD A4C: 49 ML (ref 18–58)
ECHO LA VOLUME AREA LENGTH: 51 ML
ECHO LA VOLUME INDEX A-L A2C: 23 ML/M2 (ref 16–34)
ECHO LA VOLUME INDEX A-L A4C: 28 ML/M2 (ref 16–34)
ECHO LA VOLUME INDEX AREA LENGTH: 27 ML/M2 (ref 16–34)
ECHO LA VOLUME INDEX MOD A2C: 22 ML/M2 (ref 16–34)
ECHO LA VOLUME INDEX MOD A4C: 26 ML/M2 (ref 16–34)
ECHO LV E' LATERAL VELOCITY: 8 CM/S
ECHO LV E' SEPTAL VELOCITY: 7 CM/S
ECHO LV EDV A2C: 104 ML
ECHO LV EDV A4C: 78 ML
ECHO LV EDV BP: 95 ML (ref 67–155)
ECHO LV EDV INDEX A4C: 42 ML/M2
ECHO LV EDV INDEX BP: 51 ML/M2
ECHO LV EDV NDEX A2C: 56 ML/M2
ECHO LV EJECTION FRACTION A2C: 53 %
ECHO LV EJECTION FRACTION A4C: 42 %
ECHO LV EJECTION FRACTION BIPLANE: 47 % (ref 55–100)
ECHO LV ESV A2C: 49 ML
ECHO LV ESV A4C: 46 ML
ECHO LV ESV BP: 50 ML (ref 22–58)
ECHO LV ESV INDEX A2C: 26 ML/M2
ECHO LV ESV INDEX A4C: 25 ML/M2
ECHO LV ESV INDEX BP: 27 ML/M2
ECHO LV FRACTIONAL SHORTENING: 21 % (ref 28–44)
ECHO LV INTERNAL DIMENSION DIASTOLE INDEX: 2.58 CM/M2
ECHO LV INTERNAL DIMENSION DIASTOLIC: 4.8 CM (ref 4.2–5.9)
ECHO LV INTERNAL DIMENSION SYSTOLIC INDEX: 2.04 CM/M2
ECHO LV INTERNAL DIMENSION SYSTOLIC: 3.8 CM
ECHO LV IVSD: 1.2 CM (ref 0.6–1)
ECHO LV IVSS: 1.4 CM
ECHO LV MASS 2D: 194 G (ref 88–224)
ECHO LV MASS INDEX 2D: 104.3 G/M2 (ref 49–115)
ECHO LV POSTERIOR WALL DIASTOLIC: 1 CM (ref 0.6–1)
ECHO LV POSTERIOR WALL SYSTOLIC: 1.4 CM
ECHO LV RELATIVE WALL THICKNESS RATIO: 0.42
ECHO LVOT AREA: 3.5 CM2
ECHO LVOT AV VTI INDEX: 1.21
ECHO LVOT DIAM: 2.1 CM
ECHO LVOT MEAN GRADIENT: 2 MMHG
ECHO LVOT PEAK GRADIENT: 2 MMHG
ECHO LVOT PEAK GRADIENT: 4 MMHG
ECHO LVOT PEAK VELOCITY: 0.8 M/S
ECHO LVOT PEAK VELOCITY: 1 M/S
ECHO LVOT STROKE VOLUME INDEX: 42.8 ML/M2
ECHO LVOT SV: 79.6 ML
ECHO LVOT VTI: 23 CM
ECHO MV A VELOCITY: 0.78 M/S
ECHO MV AREA PHT: 1.5 CM2
ECHO MV AREA VTI: 2.4 CM2
ECHO MV E DECELERATION TIME (DT): 152.5 MS
ECHO MV E VELOCITY: 0.57 M/S
ECHO MV E/A RATIO: 0.73
ECHO MV E/E' LATERAL: 7.13
ECHO MV E/E' RATIO (AVERAGED): 7.63
ECHO MV E/E' SEPTAL: 8.14
ECHO MV EROA PISA: 0.1 CM2
ECHO MV LVOT VTI INDEX: 1.43
ECHO MV MAX VELOCITY: 0.9 M/S
ECHO MV MEAN GRADIENT: 1 MMHG
ECHO MV MEAN VELOCITY: 0.5 M/S
ECHO MV PEAK GRADIENT: 3 MMHG
ECHO MV PRESSURE HALF TIME (PHT): 149.2 MS
ECHO MV REGURGITANT ALIASING (NYQUIST) VELOCITY: 30 CM/S
ECHO MV REGURGITANT PEAK GRADIENT: 154 MMHG
ECHO MV REGURGITANT PEAK VELOCITY: 6.2 M/S
ECHO MV REGURGITANT RADIUS PISA: 0.51 CM
ECHO MV REGURGITANT VELOCITY PISA: 6.2 M/S
ECHO MV REGURGITANT VOLUME PISA: 16.48 ML
ECHO MV REGURGITANT VTIA: 208.5 CM
ECHO MV VTI: 32.9 CM
ECHO PULMONARY ARTERY END DIASTOLIC PRESSURE: 4 MMHG
ECHO PV MAX VELOCITY: 0.6 M/S
ECHO PV PEAK GRADIENT: 2 MMHG
ECHO PV REGURGITANT MAX VELOCITY: 1 M/S
ECHO RIGHT VENTRICULAR SYSTOLIC PRESSURE (RVSP): 27 MMHG
ECHO RV TAPSE: 1.7 CM (ref 1.7–?)
ECHO TV REGURGITANT MAX VELOCITY: 2.18 M/S
ECHO TV REGURGITANT PEAK GRADIENT: 19 MMHG
EKG ATRIAL RATE: 83 BPM
EKG P AXIS: 55 DEGREES
EKG P-R INTERVAL: 156 MS
EKG Q-T INTERVAL: 380 MS
EKG QRS DURATION: 94 MS
EKG QTC CALCULATION (BAZETT): 446 MS
EKG R AXIS: -3 DEGREES
EKG T AXIS: 47 DEGREES
EKG VENTRICULAR RATE: 83 BPM
EOSINOPHIL # BLD: 0 K/UL (ref 0–0.7)
EOSINOPHIL NFR BLD: 0.1 %
ERYTHROCYTE [DISTWIDTH] IN BLOOD BY AUTOMATED COUNT: 13.5 % (ref 11.5–14.5)
GLUCOSE BLD-MCNC: 100 MG/DL (ref 70–99)
GLUCOSE BLD-MCNC: 136 MG/DL (ref 70–99)
GLUCOSE BLD-MCNC: 154 MG/DL (ref 70–99)
GLUCOSE BLD-MCNC: 160 MG/DL (ref 70–99)
GLUCOSE SERPL-MCNC: 138 MG/DL (ref 70–99)
GLUCOSE SERPL-MCNC: 140 MG/DL (ref 70–99)
GLUCOSE SERPL-MCNC: 173 MG/DL (ref 70–99)
HCT VFR BLD AUTO: 39.4 % (ref 42–52)
HGB BLD-MCNC: 13.4 G/DL (ref 14–18)
LYMPHOCYTES # BLD: 0.7 K/UL (ref 1–4.8)
LYMPHOCYTES NFR BLD: 6.7 %
MAGNESIUM SERPL-MCNC: 1.6 MG/DL (ref 1.7–2.4)
MCH RBC QN AUTO: 28.3 PG (ref 27–31.3)
MCHC RBC AUTO-ENTMCNC: 34 % (ref 33–37)
MCV RBC AUTO: 83.3 FL (ref 79–92.2)
MONOCYTES # BLD: 0.5 K/UL (ref 0.2–0.8)
MONOCYTES NFR BLD: 4.9 %
NEUTROPHILS # BLD: 9.8 K/UL (ref 1.4–6.5)
NEUTS SEG NFR BLD: 87.8 %
PERFORMED ON: ABNORMAL
PHOSPHATE SERPL-MCNC: 1.9 MG/DL (ref 2.3–4.8)
PHOSPHATE SERPL-MCNC: 2 MG/DL (ref 2.3–4.8)
PHOSPHATE SERPL-MCNC: 2 MG/DL (ref 2.3–4.8)
PLATELET # BLD AUTO: 188 K/UL (ref 130–400)
POTASSIUM SERPL-SCNC: 4 MEQ/L (ref 3.4–4.9)
POTASSIUM SERPL-SCNC: 4 MEQ/L (ref 3.4–4.9)
POTASSIUM SERPL-SCNC: 4.1 MEQ/L (ref 3.4–4.9)
RBC # BLD AUTO: 4.73 M/UL (ref 4.7–6.1)
SODIUM SERPL-SCNC: 138 MEQ/L (ref 135–144)
SODIUM SERPL-SCNC: 138 MEQ/L (ref 135–144)
SODIUM SERPL-SCNC: 139 MEQ/L (ref 135–144)
WBC # BLD AUTO: 11.1 K/UL (ref 4.8–10.8)

## 2024-05-20 PROCEDURE — 99232 SBSQ HOSP IP/OBS MODERATE 35: CPT | Performed by: INTERNAL MEDICINE

## 2024-05-20 PROCEDURE — 93306 TTE W/DOPPLER COMPLETE: CPT | Performed by: INTERNAL MEDICINE

## 2024-05-20 PROCEDURE — 6360000002 HC RX W HCPCS: Performed by: FAMILY MEDICINE

## 2024-05-20 PROCEDURE — 86341 ISLET CELL ANTIBODY: CPT

## 2024-05-20 PROCEDURE — 36415 COLL VENOUS BLD VENIPUNCTURE: CPT

## 2024-05-20 PROCEDURE — 83735 ASSAY OF MAGNESIUM: CPT

## 2024-05-20 PROCEDURE — 93306 TTE W/DOPPLER COMPLETE: CPT

## 2024-05-20 PROCEDURE — 2580000003 HC RX 258: Performed by: INTERNAL MEDICINE

## 2024-05-20 PROCEDURE — 82533 TOTAL CORTISOL: CPT

## 2024-05-20 PROCEDURE — 84681 ASSAY OF C-PEPTIDE: CPT

## 2024-05-20 PROCEDURE — 1210000000 HC MED SURG R&B

## 2024-05-20 PROCEDURE — 2580000003 HC RX 258: Performed by: FAMILY MEDICINE

## 2024-05-20 PROCEDURE — 99222 1ST HOSP IP/OBS MODERATE 55: CPT | Performed by: INTERNAL MEDICINE

## 2024-05-20 PROCEDURE — 85025 COMPLETE CBC W/AUTO DIFF WBC: CPT

## 2024-05-20 PROCEDURE — 6360000002 HC RX W HCPCS: Performed by: INTERNAL MEDICINE

## 2024-05-20 PROCEDURE — 6370000000 HC RX 637 (ALT 250 FOR IP): Performed by: FAMILY MEDICINE

## 2024-05-20 PROCEDURE — 84100 ASSAY OF PHOSPHORUS: CPT

## 2024-05-20 PROCEDURE — 80048 BASIC METABOLIC PNL TOTAL CA: CPT

## 2024-05-20 PROCEDURE — 6370000000 HC RX 637 (ALT 250 FOR IP): Performed by: INTERNAL MEDICINE

## 2024-05-20 PROCEDURE — 2500000003 HC RX 250 WO HCPCS: Performed by: INTERNAL MEDICINE

## 2024-05-20 RX ORDER — INSULIN LISPRO 100 [IU]/ML
0-4 INJECTION, SOLUTION INTRAVENOUS; SUBCUTANEOUS NIGHTLY
Status: DISCONTINUED | OUTPATIENT
Start: 2024-05-20 | End: 2024-05-22 | Stop reason: HOSPADM

## 2024-05-20 RX ORDER — ENOXAPARIN SODIUM 100 MG/ML
40 INJECTION SUBCUTANEOUS DAILY
Status: DISCONTINUED | OUTPATIENT
Start: 2024-05-20 | End: 2024-05-22 | Stop reason: HOSPADM

## 2024-05-20 RX ORDER — INSULIN LISPRO 100 [IU]/ML
0-8 INJECTION, SOLUTION INTRAVENOUS; SUBCUTANEOUS
Status: DISCONTINUED | OUTPATIENT
Start: 2024-05-20 | End: 2024-05-22 | Stop reason: HOSPADM

## 2024-05-20 RX ORDER — LORAZEPAM 1 MG/1
1 TABLET ORAL EVERY 8 HOURS
Status: DISCONTINUED | OUTPATIENT
Start: 2024-05-20 | End: 2024-05-21

## 2024-05-20 RX ORDER — GABAPENTIN 100 MG/1
100 CAPSULE ORAL 3 TIMES DAILY
Status: DISCONTINUED | OUTPATIENT
Start: 2024-05-20 | End: 2024-05-22 | Stop reason: HOSPADM

## 2024-05-20 RX ORDER — MAGNESIUM SULFATE IN WATER 40 MG/ML
2000 INJECTION, SOLUTION INTRAVENOUS ONCE
Status: COMPLETED | OUTPATIENT
Start: 2024-05-20 | End: 2024-05-20

## 2024-05-20 RX ADMIN — SODIUM CHLORIDE, PRESERVATIVE FREE 5 ML: 5 INJECTION INTRAVENOUS at 08:36

## 2024-05-20 RX ADMIN — ASPIRIN 81 MG: 81 TABLET, COATED ORAL at 08:22

## 2024-05-20 RX ADMIN — DILTIAZEM HYDROCHLORIDE 180 MG: 180 CAPSULE, COATED, EXTENDED RELEASE ORAL at 08:22

## 2024-05-20 RX ADMIN — SODIUM CHLORIDE, POTASSIUM CHLORIDE, SODIUM LACTATE AND CALCIUM CHLORIDE: 600; 310; 30; 20 INJECTION, SOLUTION INTRAVENOUS at 08:20

## 2024-05-20 RX ADMIN — ENOXAPARIN SODIUM 40 MG: 100 INJECTION SUBCUTANEOUS at 18:03

## 2024-05-20 RX ADMIN — PANTOPRAZOLE SODIUM 40 MG: 40 TABLET, DELAYED RELEASE ORAL at 08:22

## 2024-05-20 RX ADMIN — THERA TABS 1 TABLET: TAB at 08:22

## 2024-05-20 RX ADMIN — LISINOPRIL 10 MG: 10 TABLET ORAL at 08:22

## 2024-05-20 RX ADMIN — INSULIN GLARGINE 20 UNITS: 100 INJECTION, SOLUTION SUBCUTANEOUS at 08:21

## 2024-05-20 RX ADMIN — HEPARIN SODIUM 5000 UNITS: 5000 INJECTION INTRAVENOUS; SUBCUTANEOUS at 07:08

## 2024-05-20 RX ADMIN — LEVOTHYROXINE SODIUM 150 MCG: 0.15 TABLET ORAL at 08:22

## 2024-05-20 RX ADMIN — ASCORBIC ACID, VITAMIN A PALMITATE, CHOLECALCIFEROL, THIAMINE HYDROCHLORIDE, RIBOFLAVIN-5 PHOSPHATE SODIUM, PYRIDOXINE HYDROCHLORIDE, NIACINAMIDE, DEXPANTHENOL, ALPHA-TOCOPHEROL ACETATE, VITAMIN K1, FOLIC ACID, BIOTIN, CYANOCOBALAMIN: 200; 3300; 200; 6; 3.6; 6; 40; 15; 10; 150; 600; 60; 5 INJECTION, SOLUTION INTRAVENOUS at 18:02

## 2024-05-20 RX ADMIN — THIAMINE HYDROCHLORIDE 100 MG: 100 INJECTION, SOLUTION INTRAMUSCULAR; INTRAVENOUS at 08:18

## 2024-05-20 RX ADMIN — GABAPENTIN 300 MG: 300 CAPSULE ORAL at 08:22

## 2024-05-20 RX ADMIN — MAGNESIUM SULFATE HEPTAHYDRATE 2000 MG: 40 INJECTION, SOLUTION INTRAVENOUS at 12:17

## 2024-05-20 ASSESSMENT — PAIN SCALES - WONG BAKER: WONGBAKER_NUMERICALRESPONSE: NO HURT

## 2024-05-20 NOTE — FLOWSHEET NOTE
Pt arrived to unit assumed care. Boogie Noriega at bedside. Pt asleep at this time does not awake to verbal stimuli, or touch. Pt does mumble. x1 urine incontinence.

## 2024-05-21 ENCOUNTER — APPOINTMENT (OUTPATIENT)
Dept: GENERAL RADIOLOGY | Age: 69
DRG: 637 | End: 2024-05-21
Payer: MEDICARE

## 2024-05-21 VITALS
TEMPERATURE: 98.6 F | DIASTOLIC BLOOD PRESSURE: 84 MMHG | OXYGEN SATURATION: 98 % | HEIGHT: 67 IN | WEIGHT: 165.1 LBS | RESPIRATION RATE: 18 BRPM | BODY MASS INDEX: 25.91 KG/M2 | SYSTOLIC BLOOD PRESSURE: 139 MMHG | HEART RATE: 97 BPM

## 2024-05-21 LAB
ANION GAP SERPL CALCULATED.3IONS-SCNC: 9 MEQ/L (ref 9–15)
BASOPHILS # BLD: 0 K/UL (ref 0–0.2)
BASOPHILS NFR BLD: 0.3 %
BUN SERPL-MCNC: 12 MG/DL (ref 8–23)
C PEPTIDE SERPL-MCNC: <0 NG/ML (ref 1.1–4.4)
CALCIUM SERPL-MCNC: 8.5 MG/DL (ref 8.5–9.9)
CHLORIDE SERPL-SCNC: 102 MEQ/L (ref 95–107)
CO2 SERPL-SCNC: 28 MEQ/L (ref 20–31)
CORTISOL COLLECTION INFO: NORMAL
CORTISOL: 19 UG/DL (ref 2.5–19.5)
CREAT SERPL-MCNC: 0.65 MG/DL (ref 0.7–1.2)
EKG ATRIAL RATE: 105 BPM
EKG P AXIS: 81 DEGREES
EKG P-R INTERVAL: 188 MS
EKG Q-T INTERVAL: 380 MS
EKG QRS DURATION: 124 MS
EKG QTC CALCULATION (BAZETT): 502 MS
EKG R AXIS: 144 DEGREES
EKG T AXIS: 42 DEGREES
EKG VENTRICULAR RATE: 105 BPM
EOSINOPHIL # BLD: 0 K/UL (ref 0–0.7)
EOSINOPHIL NFR BLD: 0.3 %
ERYTHROCYTE [DISTWIDTH] IN BLOOD BY AUTOMATED COUNT: 13.2 % (ref 11.5–14.5)
GLUCOSE BLD-MCNC: 121 MG/DL (ref 70–99)
GLUCOSE BLD-MCNC: 142 MG/DL (ref 70–99)
GLUCOSE BLD-MCNC: 256 MG/DL (ref 70–99)
GLUCOSE BLD-MCNC: 283 MG/DL (ref 70–99)
GLUCOSE BLD-MCNC: 338 MG/DL (ref 70–99)
GLUCOSE BLD-MCNC: 71 MG/DL (ref 70–99)
GLUCOSE SERPL-MCNC: 290 MG/DL (ref 70–99)
HCT VFR BLD AUTO: 38.4 % (ref 42–52)
HGB BLD-MCNC: 13.1 G/DL (ref 14–18)
LYMPHOCYTES # BLD: 0.5 K/UL (ref 1–4.8)
LYMPHOCYTES NFR BLD: 6.9 %
MAGNESIUM SERPL-MCNC: 1.7 MG/DL (ref 1.7–2.4)
MCH RBC QN AUTO: 28.4 PG (ref 27–31.3)
MCHC RBC AUTO-ENTMCNC: 34.1 % (ref 33–37)
MCV RBC AUTO: 83.1 FL (ref 79–92.2)
MONOCYTES # BLD: 0.5 K/UL (ref 0.2–0.8)
MONOCYTES NFR BLD: 6.7 %
NEUTROPHILS # BLD: 6.4 K/UL (ref 1.4–6.5)
NEUTS SEG NFR BLD: 85.5 %
PERFORMED ON: ABNORMAL
PERFORMED ON: NORMAL
PLATELET # BLD AUTO: 150 K/UL (ref 130–400)
POTASSIUM SERPL-SCNC: 4.1 MEQ/L (ref 3.4–4.9)
RBC # BLD AUTO: 4.62 M/UL (ref 4.7–6.1)
SODIUM SERPL-SCNC: 139 MEQ/L (ref 135–144)
WBC # BLD AUTO: 7.5 K/UL (ref 4.8–10.8)

## 2024-05-21 PROCEDURE — G0108 DIAB MANAGE TRN  PER INDIV: HCPCS

## 2024-05-21 PROCEDURE — 36415 COLL VENOUS BLD VENIPUNCTURE: CPT

## 2024-05-21 PROCEDURE — 6370000000 HC RX 637 (ALT 250 FOR IP): Performed by: FAMILY MEDICINE

## 2024-05-21 PROCEDURE — 99233 SBSQ HOSP IP/OBS HIGH 50: CPT | Performed by: INTERNAL MEDICINE

## 2024-05-21 PROCEDURE — 83735 ASSAY OF MAGNESIUM: CPT

## 2024-05-21 PROCEDURE — 2580000003 HC RX 258: Performed by: FAMILY MEDICINE

## 2024-05-21 PROCEDURE — 80048 BASIC METABOLIC PNL TOTAL CA: CPT

## 2024-05-21 PROCEDURE — 86042 ACETYLCHOLN RCPTR BLCKG ANTB: CPT

## 2024-05-21 PROCEDURE — 6370000000 HC RX 637 (ALT 250 FOR IP): Performed by: INTERNAL MEDICINE

## 2024-05-21 PROCEDURE — 6360000002 HC RX W HCPCS: Performed by: NURSE PRACTITIONER

## 2024-05-21 PROCEDURE — 99231 SBSQ HOSP IP/OBS SF/LOW 25: CPT | Performed by: INTERNAL MEDICINE

## 2024-05-21 PROCEDURE — 74230 X-RAY XM SWLNG FUNCJ C+: CPT

## 2024-05-21 PROCEDURE — 85025 COMPLETE CBC W/AUTO DIFF WBC: CPT

## 2024-05-21 PROCEDURE — 2500000003 HC RX 250 WO HCPCS: Performed by: INTERNAL MEDICINE

## 2024-05-21 PROCEDURE — APPSS30 APP SPLIT SHARED TIME 16-30 MINUTES

## 2024-05-21 PROCEDURE — 92610 EVALUATE SWALLOWING FUNCTION: CPT

## 2024-05-21 PROCEDURE — 92611 MOTION FLUOROSCOPY/SWALLOW: CPT

## 2024-05-21 PROCEDURE — 6360000002 HC RX W HCPCS: Performed by: INTERNAL MEDICINE

## 2024-05-21 PROCEDURE — 6360000002 HC RX W HCPCS: Performed by: FAMILY MEDICINE

## 2024-05-21 PROCEDURE — 86041 ACETYLCHOLN RCPTR BNDNG ANTB: CPT

## 2024-05-21 RX ORDER — INSULIN LISPRO 100 [IU]/ML
4 INJECTION, SOLUTION INTRAVENOUS; SUBCUTANEOUS
Status: DISCONTINUED | OUTPATIENT
Start: 2024-05-21 | End: 2024-05-21

## 2024-05-21 RX ORDER — INSULIN GLARGINE 100 [IU]/ML
30 INJECTION, SOLUTION SUBCUTANEOUS DAILY
Status: DISCONTINUED | OUTPATIENT
Start: 2024-05-22 | End: 2024-05-22 | Stop reason: HOSPADM

## 2024-05-21 RX ORDER — INSULIN GLARGINE 100 [IU]/ML
35 INJECTION, SOLUTION SUBCUTANEOUS DAILY
Status: DISCONTINUED | OUTPATIENT
Start: 2024-05-22 | End: 2024-05-21

## 2024-05-21 RX ORDER — LORAZEPAM 2 MG/ML
1 INJECTION INTRAMUSCULAR EVERY 8 HOURS
Status: DISCONTINUED | OUTPATIENT
Start: 2024-05-21 | End: 2024-05-22 | Stop reason: HOSPADM

## 2024-05-21 RX ADMIN — Medication 1 MG: at 21:06

## 2024-05-21 RX ADMIN — INSULIN LISPRO 4 UNITS: 100 INJECTION, SOLUTION INTRAVENOUS; SUBCUTANEOUS at 14:19

## 2024-05-21 RX ADMIN — BARIUM SULFATE 50 G: 0.81 POWDER, FOR SUSPENSION ORAL at 13:36

## 2024-05-21 RX ADMIN — BARIUM SULFATE 80 ML: 400 SUSPENSION ORAL at 13:35

## 2024-05-21 RX ADMIN — INSULIN LISPRO 6 UNITS: 100 INJECTION, SOLUTION INTRAVENOUS; SUBCUTANEOUS at 08:01

## 2024-05-21 RX ADMIN — INSULIN GLARGINE 20 UNITS: 100 INJECTION, SOLUTION SUBCUTANEOUS at 08:02

## 2024-05-21 RX ADMIN — ENOXAPARIN SODIUM 40 MG: 100 INJECTION SUBCUTANEOUS at 17:17

## 2024-05-21 RX ADMIN — THIAMINE HYDROCHLORIDE 100 MG: 100 INJECTION, SOLUTION INTRAMUSCULAR; INTRAVENOUS at 08:01

## 2024-05-21 RX ADMIN — SODIUM CHLORIDE, PRESERVATIVE FREE 5 ML: 5 INJECTION INTRAVENOUS at 08:01

## 2024-05-21 RX ADMIN — ASCORBIC ACID, VITAMIN A PALMITATE, CHOLECALCIFEROL, THIAMINE HYDROCHLORIDE, RIBOFLAVIN-5 PHOSPHATE SODIUM, PYRIDOXINE HYDROCHLORIDE, NIACINAMIDE, DEXPANTHENOL, ALPHA-TOCOPHEROL ACETATE, VITAMIN K1, FOLIC ACID, BIOTIN, CYANOCOBALAMIN: 200; 3300; 200; 6; 3.6; 6; 40; 15; 10; 150; 600; 60; 5 INJECTION, SOLUTION INTRAVENOUS at 18:33

## 2024-05-21 RX ADMIN — INSULIN LISPRO 4 UNITS: 100 INJECTION, SOLUTION INTRAVENOUS; SUBCUTANEOUS at 11:52

## 2024-05-21 NOTE — CARE COORDINATION
This JANETW visited patient at bedside this am. Patient and I discussed discharge planning. Patient denied [employer] information /assistance with alcoholism.  Patient plans to return home upon discharge and is denying needs at this time.  AKIRA/RUBÉN to follow.  Electronically signed by AKIRA Lewis on 5/21/24 at 10:00 AM EDT   
This LSW visited patient and son at bedside today.  Attempted to speak with patient re: consult for alcohol rehab., patient  very drowsy during visit.  JANETW/CM to follow.  Electronically signed by AKIRA Lewis on 5/20/24 at 12:25 PM EDT    
Issues/Barriers to RETURNING to current housing: none  Potential Assistance needed at discharge: (P) Other (Comment) (son denied that he had any needs, he states that he helps him.)            Potential DME:    Patient expects to discharge to: (P) House  Plan for transportation at discharge:      Financial    Payor: [employer] MEDICARE / Plan: AET[employer] MEDICARE ADVANTAGE HMO / Product Type: Medicare /     Does insurance require precert for SNF: Yes    Potential assistance Purchasing Medications: (P) No  Meds-to-Beds request:        [facility] [address] [place] [id number] [facility] -  [phone] - F [phone]  [address] [facility] [address]  Phone: [phone] Fax: [phone]      Notes:    Factors facilitating achievement of predicted outcomes: Family support, Cooperative, and Pleasant    Barriers to discharge: none    Additional Case Management Notes: this nurse spoke with the patient's son Octaviano since the patient was resting. He states the patient lives alone, does not use any durable medical equipment, home O2 and is not on dialysis. The patient was very confused on arrival to ER. Due to the patient's dx of DKA, he may benefit from closer diabetic management.    The Plan for Transition of Care is related to the following treatment goals of DKA, type 1, not at goal (HCC) [E10.10]  Type 1 diabetes mellitus with ketoacidosis without coma (HCC) [E10.10]    IF APPLICABLE: The Patient and/or patient representative Herminio and his family were provided with a choice of provider and agrees with the discharge plan. Freedom of choice list with basic dialogue that supports the patient's individualized plan of care/goals and shares the quality data associated with the providers was provided to:     Patient Representative Name:       The Patient and/or Patient Representative Agree with the Discharge Plan?      Zoila Haas RN  Case Management Department

## 2024-05-21 NOTE — FLOWSHEET NOTE
1521 - Veronica Ritter notified of pt's desire to leave AMA and son's concern for pt's condition. Son would like to see neurology.     1720 - Dr. Pacheco notified of pt's desire to leave AMA and son's concern and desire to see neurology.     1815 - Pt agreeable to stay and allows for new IV to be inserted. Educated pt and pt's son (with permission from the pt) that Dr. Pacheco would be able to see pt tomorrow and that further labs were ordered by pt.

## 2024-05-21 NOTE — PLAN OF CARE
Problem: Discharge Planning  Goal: Discharge to home or other facility with appropriate resources  Outcome: Progressing     Problem: Pain  Goal: Verbalizes/displays adequate comfort level or baseline comfort level  Outcome: Progressing     Problem: Safety - Adult  Goal: Free from fall injury  Outcome: Progressing     Problem: ABCDS Injury Assessment  Goal: Absence of physical injury  Outcome: Progressing     Problem: Skin/Tissue Integrity  Goal: Absence of new skin breakdown  Outcome: Progressing     
  Problem: Discharge Planning  Goal: Discharge to home or other facility with appropriate resources  Outcome: Progressing  Flowsheets (Taken 5/20/2024 2050)  Discharge to home or other facility with appropriate resources: Identify barriers to discharge with patient and caregiver     Problem: Pain  Goal: Verbalizes/displays adequate comfort level or baseline comfort level  Outcome: Progressing  Flowsheets (Taken 5/20/2024 2050)  Verbalizes/displays adequate comfort level or baseline comfort level:   Encourage patient to monitor pain and request assistance   Assess pain using appropriate pain scale   Administer analgesics based on type and severity of pain and evaluate response     Problem: Safety - Adult  Goal: Free from fall injury  Outcome: Progressing     Problem: ABCDS Injury Assessment  Goal: Absence of physical injury  Outcome: Progressing     Problem: Skin/Tissue Integrity  Goal: Absence of new skin breakdown  Description: 1.  Monitor for areas of redness and/or skin breakdown  2.  Assess vascular access sites hourly  3.  Every 4-6 hours minimum:  Change oxygen saturation probe site  4.  Every 4-6 hours:  If on nasal continuous positive airway pressure, respiratory therapy assess nares and determine need for appliance change or resting period.  Outcome: Progressing     Problem: Infection - Adult  Goal: Absence of infection at discharge  Outcome: Progressing  Flowsheets (Taken 5/20/2024 2050)  Absence of infection at discharge: Assess and monitor for signs and symptoms of infection     
BSE completed.  
MBS completed.  
for areas of redness and/or skin breakdown  2.  Assess vascular access sites hourly  3.  Every 4-6 hours minimum:  Change oxygen saturation probe site  4.  Every 4-6 hours:  If on nasal continuous positive airway pressure, respiratory therapy assess nares and determine need for appliance change or resting period.  5/20/2024 1141 by Taylor Gardner RN  Outcome: Progressing    Problem: Infection - Adult  Goal: Absence of infection at discharge  5/20/2024 1141 by Taylor Gardner RN  Outcome: Progressing  Absence of infection at discharge:   Assess and monitor for signs and symptoms of infection   Monitor lab/diagnostic results   Monitor all insertion sites i.e., indwelling lines, tubes and drains   Monitor endotracheal (as able) and nasal secretions for changes in amount and color   Administer medications as ordered   [place] appropriate cooling/warming therapies per order   Instruct and encourage patient and family to use good hand hygiene technique  Goal: Absence of infection during hospitalization  5/20/2024 1141 by Taylor Gardner RN  Outcome: Progressing  Absence of infection during hospitalization:   Assess and monitor for signs and symptoms of infection   Monitor lab/diagnostic results   Monitor all insertion sites i.e., indwelling lines, tubes and drains   Monitor endotracheal (as able) and nasal secretions for changes in amount and color   [place] appropriate cooling/warming therapies per order   Administer medications as ordered   Instruct and encourage patient and family to use good hand hygiene technique   Identify and instruct in appropriate isolation precautions for identified infection/condition  Goal: Absence of fever/infection during anticipated neutropenic period  5/20/2024 1141 by Taylor Gardner RN  Outcome: Progressing    Problem: Metabolic/Fluid and Electrolytes - Adult  Goal: Electrolytes maintained within normal limits  5/20/2024 1141 by Taylor Gardner RN  Outcome: 
nasal secretions for changes in amount and color   Administer medications as ordered   [place] appropriate cooling/warming therapies per order   Instruct and encourage patient and family to use good hand hygiene technique  Goal: Absence of infection during hospitalization  Outcome: Progressing  Flowsheets (Taken 5/19/2024 2000)  Absence of infection during hospitalization:   Assess and monitor for signs and symptoms of infection   Monitor lab/diagnostic results   Monitor all insertion sites i.e., indwelling lines, tubes and drains   Monitor endotracheal (as able) and nasal secretions for changes in amount and color   [place] appropriate cooling/warming therapies per order   Administer medications as ordered   Instruct and encourage patient and family to use good hand hygiene technique   Identify and instruct in appropriate isolation precautions for identified infection/condition  Goal: Absence of fever/infection during anticipated neutropenic period  Outcome: Progressing  Flowsheets (Taken 5/19/2024 2000)  Absence of fever/infection during anticipated neutropenic period:   Monitor white blood cell count   Administer growth factors as ordered   Implement neutropenic guidelines     Problem: Metabolic/Fluid and Electrolytes - Adult  Goal: Electrolytes maintained within normal limits  Outcome: Progressing  Flowsheets (Taken 5/19/2024 2000)  Electrolytes maintained within normal limits:   Monitor labs and assess patient for signs and symptoms of electrolyte imbalances   Administer electrolyte replacement as ordered   Monitor response to electrolyte replacements, including repeat lab results as appropriate   Fluid restriction as ordered   Instruct patient on fluid and nutrition restrictions as appropriate  Goal: Hemodynamic stability and optimal renal function maintained  Outcome: Progressing  Flowsheets (Taken 5/19/2024 2000)  Hemodynamic stability and optimal renal function maintained:   Monitor labs and assess for

## 2024-05-21 NOTE — CONSULTS
[facility]                   [address] [place] [address]                              CONSULTATION      PATIENT NAME: JUANJOSE CONLEY                 : 1955  MED REC NO: [id number]                        ROOM: [id number]  ACCOUNT NO: [id number]                       ADMIT DATE: 2024  PROVIDER: Adrien Ellis MD    ENDOCRINE CONSULT    CONSULT DATE:  2024    REFERRING PHYSICIAN:  Shelly Phelps      REASON FOR CONSULT:  Management of type 1 diabetes, DKA, glucose more than 1100.    CHIEF COMPLAINT AND HISTORY OF PRESENT ILLNESS:  Obtained through prior H and P and the patient's family.  The patient is a 69-year-old male with known history of type 1 diabetes for many years, admitted with altered mental status.  The patient was brought in by EMS after his son found him to be confused and not acting normal, has been seen mostly at the [facility], was found to have a glucose of more than 700 initially.  The patient was started on IV fluids, IV insulin drip, has been taken off the insulin drip.  The patient is still confused and having issues with swallowing food.  He is going to have modified barium swallow.  The patient's hemoglobin A1c was 11.5.  Currently, he is on Lantus 20 units daily plus Humalog high-dose coverage.  Known history of hypothyroidism, on levothyroxine 150 mcg daily.  TSH was normal.  Also being consulted for increased abnormal troponins.  Reviewed most current chemistry.  Sodium 138, potassium 4.0, chloride 103, CO2 was 27, BUN 11, creatinine 0.8.  Phosphorus was between 1.9 to 2.0.  TSH was 1.230.  Baseline chemistry; glucose was 1118, sodium 132, potassium 5.5, chloride was 80, CO2 was 4, BUN 33, creatinine was 2.13.    PAST MEDICAL HISTORY:  Significant for type 1 diabetes, hypertension, hyperlipidemia, hypothyroidism, benign prostatic hyperplasia.    SURGICAL HISTORY:  Inguinal hernia repair.    FAMILY HISTORY:  Reviewed, 
Comprehensive Nutrition Assessment    Type and Reason for Visit:  Initial (New PPN)    Nutrition Recommendations/Plan:   Follow up for results of MBS for recommendations     Malnutrition Assessment:  Malnutrition Status:  Insufficient data (05/21/24 1454)        Nutrition Assessment:    Suspect poor nutritional status PTA related to uncontrolled DM and ETOH abuse, off floor for MBS at time of visit, recommendations to be determined by results of MBS, Noted PPN as  ordered delivers ~ 50% estimated energy/90%estimated protein needs, If pt to remain NPO, recommend corPak placement for tube feeding as preferred route of nutrition support    Nutrition Related Findings:    PMH: Diabetes, HTN, Hyperlipidemia, Hypothyroidism. Pt brought in from home by EMS after his son found him to be confused and not acting normally at home. Has hx of DKA.  Confused, does endorse drinking 6+ beers daily, +CIWA protocol, BSE (5/21)=NPO, MBS planned at 1:00 5/21.  Labs: glucose > 200, meds include synthroid, MVI, thiamin.  PPN 5/21 @ 75 ml/hr, BM 5/17, no edema noted Wound Type: None       Current Nutrition Intake & Therapies:    Average Meal Intake: NPO  Average Supplements Intake: NPO  PN-Adult Premix (4.25/10) Peripheral Line with electrolytes  Diet NPO  Current Parenteral Nutrition Orders:  Type and Formula: Premix Peripheral   Lipids: None  Duration: Continuous  Rate/Volume: 75 ml/yhr = 1800  Current PN Order Provides: 918 kcals, 76 g protein  Goal PN Orders Provides: 918 kcals, 76 g protein    Anthropometric Measures:  Height: 170.2 cm (5' 7\")  Ideal Body Weight (IBW): 148 lbs (67 kg)    Admission Body Weight: 68.5 kg (151 lb) (5/18-bed scale)  Current Body Weight: 74.8 kg (165 lb) (5/20), 111.5 % IBW. Weight Source: Bed Scale  Current BMI (kg/m2): 25.8  Usual Body Weight: 77.1 kg (170 lb) (3/16/23 office visit)  % Weight Change (Calculated): -2.9                    BMI Categories: Overweight (BMI 25.0-29.9)    Estimated Daily 
ileum cecum and appendix demonstrate no acute abnormalities the colonic gas pattern is within the normal range otherwise. Pelvis: There is a Mobley catheter within the urinary bladder.  The prostate gland is not appear enlarged.  There are no signs of significant lymphadenopathy or ascites within the pelvis Peritoneum/Retroperitoneum: There are no signs of retroperitoneal lymphadenopathy aneurysm or signs of ascites.  The aorta and iliac vessels are heavily calcified compatible with diabetes mellitus or severe peripheral vascular arterial disease Bones/Soft Tissues: No acute abnormalities of the spine or pelvis are identified     1. No acute intra-abdominal or pelvic process. 2. Dense appearance of the liver suggests early signs of steatosis.     CT HEAD WO CONTRAST    Result Date: 5/18/2024  EXAMINATION: CT OF THE HEAD WITHOUT CONTRAST  5/18/2024 5:01 am TECHNIQUE: CT of the head was performed without the administration of intravenous contrast. Automated exposure control, iterative reconstruction, and/or weight based adjustment of the mA/kV was utilized to reduce the radiation dose to as low as reasonably achievable. COMPARISON: None. HISTORY: ORDERING SYSTEM PROVIDED HISTORY: altered mental status TECHNOLOGIST PROVIDED HISTORY: Reason for exam:->altered mental status Has a \"code stroke\" or \"stroke alert\" been called?->No Decision Support Exception - unselect if not a suspected or confirmed emergency medical condition->Emergency Medical Condition (MA) What reading provider will be dictating this exam?->CRC FINDINGS: BRAIN/VENTRICLES: There is no acute intracranial hemorrhage, mass effect or midline shift.  No abnormal extra-axial fluid collection.  The gray-white differentiation is maintained without evidence of an acute infarct. There is prominence of the ventricles and sulci compatible with underlying signs of cerebral and cerebellar atrophy. ORBITS: The visualized portion of the orbits demonstrate no acute 
  Component Value Date    TSH 1.230 05/18/2024      Cardiac Injury Profile: No results for input(s): \"CKTOTAL\", \"CKMB\", \"CKMBINDEX\", \"TROPONINI\" in the last 72 hours.   Troponin:   Lab Results   Component Value Date/Time    TROPONINI <0.010 07/19/2015 01:30 AM     Lipid Profile:   Lab Results   Component Value Date/Time    TRIG 131 07/19/2015 01:30 AM    HDL 66 07/19/2015 01:30 AM    CHOL 258 07/19/2015 01:30 AM      Hemoglobin A1C: No components found for: \"HGBA1C\"       Radiology:  CT ABDOMEN PELVIS W IV CONTRAST Additional Contrast? None    Result Date: 5/18/2024  EXAMINATION: CT OF THE ABDOMEN AND PELVIS WITH CONTRAST 5/18/2024 5:01 am TECHNIQUE: CT of the abdomen and pelvis was performed with the administration of intravenous contrast. Multiplanar reformatted images are provided for review. Automated exposure control, iterative reconstruction, and/or weight based adjustment of the mA/kV was utilized to reduce the radiation dose to as low as reasonably achievable. COMPARISON: None. HISTORY: ORDERING SYSTEM PROVIDED HISTORY: vomiting TECHNOLOGIST PROVIDED HISTORY: Reason for exam:->vomiting Additional Contrast?->None Decision Support Exception - unselect if not a suspected or confirmed emergency medical condition->Emergency Medical Condition (MA) What reading provider will be dictating this exam?->CRC FINDINGS: Lower Chest: There is minimal linear density at the right lung base posteriorly felt represent atelectasis or early signs of pneumonitis.  There are no signs of consolidation or pleural effusion.  The heart does not appear enlarged. Organs: The liver is dense suggests early signs of steatosis.  No discrete mass or duct dilation was observed.  The gallbladder appears unremarkable. The visualized pancreas, spleen and adrenal glands appear within the normal range.  The kidneys reveal symmetrical density without stones or hydronephrosis.  There is an extrarenal pelvis of the left renal collecting system.  No

## 2024-05-21 NOTE — PROCEDURES
swallow, Mendelsohn maneuver, Henna, falsetto & effortful pitch glide, supraglottic swallow, super supraglottic swallow) with 80% accuracy in order to strengthen and establish and more effective swallow.   4.  Pt will tolerate therapeutic PO trials of ice chips after oral care with ST only with adequate mastication and oral clearance of bolus with no overt s/s of aspiration on 100% trials.          Prognosis for improvements is: Fair, Age      Pain Assessment:  Patient does not c/o pain.    Pain Re-assessment:  Patient does not c/o pain.      Radiologist:  Available on Consult as needed, Radiology Tech present    Treatment goals were discussed with the:   Patient., who gives decreased understanding of recommendations.  Family member. , who gives verbal understanding of recommendations.    Thank you for your referral.  Please direct any questions or concerns to Speech Pathology.    Images are available through [facility]/PACS. Please see radiologist report for additional details.      Therapy Time  SLP Individual Minutes  Time In: 1320  Time Out: 1337  Minutes: 17            Signature:  Electronically signed by ISIDRO Shaffer on 5/21/2024 at 4:03 PM

## 2024-05-22 LAB — GAD65 AB SER IA-ACNC: >250 IU/ML (ref 0–5)

## 2024-05-22 NOTE — PROGRESS NOTES
1558- Per physician patient is alert and oriented at this time, able to make his own decisions. Patient threatening to leave against medical advice, this RN explained risks of leaving against medical advice including but not limited to: failed modified barium swallow- speech recommending nothing by mouth and neurology consult (which has been placed), risk of aspiration/development of pneumonia if he eats/drinks anything at this time, high risk of falls if he leaves as he is unsteady on his own, requiring 1 assist up to the bathroom, need for further physical/occupational therapy, further diabetes management/education, monitoring for further alcohol withdrawal, etc.  Son at bedside during this conversation, states he just wants his dad to get better. This RN explained pt is able to make his own decisions regarding treatment per physician.     1610- pt now agreeable to blood work being drawn     1842- pt calm and cooperative at this time, tele pack has been applied  
DVT / VTE PROPHYLAXIS EVALUATION    Recent Labs     05/18/24  0245 05/18/24  0358   BUN  --  33*   CREATININE  --  2.13*     --    HGB 11.5*  --    HCT 38.7*  --      ADMITTING DX OR CHIEF COMPLAINT? DKA  WARFARIN? DOAC'S? no  ANY APPARENT BLEEDING? no  SCHEDULED SURGERY? no     If yes to following, excluded from auto adjustment in Table 1 of policy - please contact provider with recommendations as appropriate.  Include condition/exception in scratch notes. Yes No   Trauma Service or Ortho Surgery []  []    COVID []  []    Pregnancy []  []        Current order:  UFH 5000 units SUBQ 3 times daily      Height: 170.2 cm (5' 7\"), Weight - Scale: 68.7 kg (151 lb 7.3 oz)  Estimated Creatinine Clearance: 31 mL/min (A) (based on SCr of 2.13 mg/dL (H)).    Plan:  No intervention recommended, continue current VTE prophylaxis as ordered     Patient Weight (kg)      50.9 and below .9 101-150.9 151-174.9 175 or greater   Estimated   CrCl  (ml/min) 30 or greater []   30 mg   SUBQ daily   []   40 mg   SUBQ daily (or 30 mg BID for orthopedic cases) []  30 mg SUBQ   BID  []  40 mg   SUBQ   BID []  60mg SUBQ BID    15-29.9 []  UFH 5000   units SUBQ BID []  30 mg   SUBQ daily [] 30 mg SUBQ   daily []  40 mg SUBQ   daily [] 60 mg SUBQ   daily    Less than 15 or dialysis []  UFH 5000   units SUBQ BID [x] UFH 5000 units SUBQ TID []  UFH 7500   units   SUBQ TID     Will leave as often preferred SC heparin in ICU in case of potential procedures.     Isi Lozada, [facility] PharmD  
DVT / VTE PROPHYLAXIS EVALUATION    Recent Labs     05/19/24  1536 05/20/24  0445 05/20/24  0815   BUN 14 12  12 11   CREATININE 0.84 0.80  0.83 0.80   PLT  --  188  --    HGB  --  13.4*  --    HCT  --  39.4*  --      ADMITTING DX OR CHIEF COMPLAINT? DKA  WARFARIN? DOAC'S? None  ANY APPARENT BLEEDING? None  SCHEDULED SURGERY? None     If yes to following, excluded from auto adjustment in Table 1 of policy - please contact provider with recommendations as appropriate.  Include condition/exception in scratch notes. Yes No   Trauma Service or Ortho Surgery []  [x]    COVID []  [x]    Pregnancy []  [x]        Current order:  UFH 5000 units SUBQ 3 times daily      Height: 170.2 cm (5' 7\"), Weight - Scale: 74.9 kg (165 lb 1.6 oz)  Estimated Creatinine Clearance: 81 mL/min (based on SCr of 0.8 mg/dL).    Plan:  Pharmacologic VTE prophylaxis modified based on patient weight and renal function per [facility]/P&T approved protocol     Patient Weight (kg)      50.9 and below .9 101-150.9 151-174.9 175 or greater   Estimated   CrCl  (ml/min) 30 or greater []   30 mg   SUBQ daily   [x]   40 mg   SUBQ daily (or 30 mg BID for orthopedic cases) []  30 mg SUBQ   BID  []  40 mg   SUBQ   BID []  60mg SUBQ BID    15-29.9 []  UFH 5000   units SUBQ BID []  30 mg   SUBQ daily [] 30 mg SUBQ   daily []  40 mg SUBQ   daily [] 60 mg SUBQ   daily    Less than 15 or dialysis []  UFH 5000   units SUBQ BID [] UFH 5000 units SUBQ TID []  UFH 7500   units   SUBQ TID     Kelly Camacho RP, PharmD    
Echo tech @ bedside   
Herminio Olguin, seen for evaluation and education on Other: DKA .     Lab Results   Component Value Date    LABA1C 11.5 (H) 05/18/2024     Lab Results   Component Value Date     05/18/2024       Herminio Olguin has had diabetes for 57 years.    Discussed with Herminio SLADE Sharif, their current diabetes self-care routines prior to this admission. medication compliance: compliant most of the time per patient report,  diabetic diet compliance: noncompliant much of the time, home glucose monitoring: is performed regularly per patient report    Survival Skill Topics discussed:  [] Type 2 Diabetes diagnosis as chronic and progressive  [x] Type 1 Diabetes diagnosis    [x] Eating healthy - Currently follows: [] plate method [] portion control [] label reading [] carb counting  [] sources of carbohydrates [x] No meal plan  He reports he eats a lot of carbs and drinks a lot of beer   Assessment of current status: [] Needs instruction [x] Needs review   [] Comprehends key points  [] Demonstrates understanding/competence  [] Not covered                   Discussed the following:  [x] plate method [] portion control [] label reading [] carb counting  [] sources of carbohydrates    [x] Being active - Currently: []  is not active  [x]  is active -- Type of activity: none specific, walks 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  Assessment of current status: [] Needs instruction [] Needs review   [] Comprehends key points  [] Demonstrates understanding/competence  [x] Not covered    [] Discussed current recommendations and safety , poor vision    [x] Medications - current medications: action, side effects, precautions   Patient's medications: long and rapid acting insulin  Assessment of current status: [] Needs instruction [] Needs review   [x] Comprehends key points  [] Demonstrates understanding/competence  [] Not covered   [x] Discussed each medication patient is taking, action, side effects, precautions, proper administration    [x] Monitoring - [] 
Internal Medicine   Hospitalist   Progress Note    2024   1:51 PM    Name:  Herminio Olguin  MRN:    [id number]     IP Day: 3     Admit Date: 2024  2:26 AM  PCP: Joelle Campuzano MD    Code Status:  Full Code    Assessment and Plan:        Active Problems/ diagnosis:     DKA-resolved  JESSICA-improved  Dehydration due to DKA  Type 2 diabetes  Alcohol abuse and withdrawal-risk of VTE  Elevated troponin-less likely ACS as per cardiologist    Plan  Resume insulin as ordered  Resume IV fluid until patient is more awake  DC as needed Ativan, ordered schedule IV Ativan with parameters.  Decrease gabapentin dose  Discussed with patient's son at length.  All question was answered to satisfaction.  Discussed with TYLER KEN PPx     7 pm- 7 am, please contact on call Hospitalist for any needs     Subjective:     More awake today per RN, he is on MPS now.  Physical Examination:      Vitals:  BP (!) 149/74   Pulse 78   Temp 98.8 °F (37.1 °C) (Oral)   Resp 18   Ht 1.702 m (5' 7\")   Wt 74.9 kg (165 lb 1.6 oz)   SpO2 97%   BMI 25.86 kg/m²   Temp (24hrs), Av.8 °F (37.1 °C), Min:97.9 °F (36.6 °C), Max:99.7 °F (37.6 °C)      General appearance: NAD  Lungs: clear to auscultation bilaterally, normal effort  Heart: regular rate   Abdomen: soft, nontender, nondistended, bowel sounds present, no masses  Extremities: no edema, redness, tenderness in the calves  Skin: no gross lesions, rashes  Neurologically nonfocal     Data:     Labs:  Recent Labs     24  0445 24  0500   WBC 11.1* 7.5   HGB 13.4* 13.1*    150     Recent Labs     24  0815 24  0500    139   K 4.0 4.1    102   CO2 27 28   BUN 11 12   CREATININE 0.80 0.65*   GLUCOSE 173* 290*     No results for input(s): \"AST\", \"ALT\", \"BILITOT\", \"ALKPHOS\" in the last 72 hours.    Invalid input(s): \"ALB\"      Current Facility-Administered Medications   Medication Dose Route Frequency Provider Last Rate Last Admin    [START ON 
Internal Medicine   Hospitalist   Progress Note    2024   2:57 PM    Name:  Herminio Olguin  MRN:    [id number]     IP Day: 3     Admit Date: 2024  2:26 AM  PCP: Joelle Campuzano MD    Code Status:  Full Code    Assessment and Plan:        Active Problems/ diagnosis:     DKA-resolved  JESSICA-improved  Dysphagia-rule out central etiology  Dehydration due to DKA  Type 2 diabetes  Alcohol abuse and withdrawal-risk of VTE  Elevated troponin-less likely ACS as per cardiologist    Plan  MBS was done recommended n.p.o., neuro was consulted to rule out central etiology.  CT head was negative for this admission.  Patient is adamant about leaving AGAINST MEDICAL ADVICE.  I explained to him in detail in the presence of his son regarding the importance of continuing his treatment.  They both verbalized understanding  Resume insulin as ordered  Resume IV fluid until patient is more awake  DC as needed Ativan, ordered schedule IV Ativan with parameters.  Decrease gabapentin dose  Discussed with patient's son at length.  All question was answered to satisfaction.  Discussed with TYLER KEN PPx     7 pm- 7 am, please contact on call Hospitalist for any needs     Subjective:     Patient is awake and alert x 3 today.  He is oriented also to situation.    Physical Examination:      Vitals:  /70   Pulse 77   Temp 99 °F (37.2 °C) (Oral)   Resp 18   Ht 1.702 m (5' 7\")   Wt 74.9 kg (165 lb 1.6 oz)   SpO2 98%   BMI 25.84 kg/m²   Temp (24hrs), Av.8 °F (37.1 °C), Min:97.9 °F (36.6 °C), Max:99.7 °F (37.6 °C)      General appearance: Awake, no acute distress  Mental Status: Appropriate.  Alert and oriented x 3.  Oriented to situation  Lungs: clear to auscultation bilaterally, normal effort  Heart: regular rate   Abdomen: soft, nontender, nondistended, bowel sounds present, no masses  Extremities: no edema, redness, tenderness in the calves  Skin: no gross lesions, rashes  Neurologically nonfocal.  Strength intact 
MBS 1pm today  
[facility]   Facility/Department: [facility]  4W MED SURG UNIT  Speech Language Pathology    Herminio LSADE Sharif  1955  [id number]    Date: 5/20/2024      Speech Therapy attempted to see Herminio Olguin on this date for a/an:    Clinical Bedside Swallow Evaluation    Pt was unable to be seen due to:   Patient is too lethargic to participate. RN Rony present and attempted sternal rubs with patient no arousing after multiple attempts. Will attempt again when able and pt alert.        Electronically signed by Katelynn Baca, SLP on 5/20/24 at 2:01 PM EDT   
[facility]   Facility/Department: [facility] [place]  4 MED SURG UNIT  Speech Language Pathology  Clinical Bedside Swallow Evaluation    NAME:Herminio Olguin  : 1955 (69 y.o.)   [x]   confirmed    MRN: [id number]  ROOM: [place] [address]  ADMISSION DATE: 2024  PATIENT DIAGNOSIS(ES): DKA, type 1, not at goal (HCC) [E10.10]  Type 1 diabetes mellitus with ketoacidosis without coma (HCC) [E10.10]  Chief Complaint   Patient presents with    Altered Mental Status     Pt arrived via EMS from home, per son altered mental status. Hx of DM. LKW 2100. Pt restless upon arrival. Given en route 1L NS, 1g Ca, 2.5mg x2 albuterol treatment.      Patient Active Problem List    Diagnosis Date Noted    DKA, type 1, not at goal (HCC) 2024     Past Medical History:   Diagnosis Date    BPH (benign prostatic hyperplasia)     Diabetes (HCC)     HTN (hypertension)     Hyperlipidemia     Hypothyroidism      Past Surgical History:   Procedure Laterality Date    INGUINAL HERNIA REPAIR       No Known Allergies    DATE ONSET: 2024    Date of Evaluation: 2024   Evaluating Therapist: Katy Moseley, ISIDRO    Dysphagia Diagnosis  Dysphagia Diagnosis: Severe pharyngeal stage dysphagia;Suspected needs further assessment  Dysphagia Impression : Pt presents with mild oral and suspected severe phryangeal dyspahgia. Pt demonstrated adequate acceptance with lingual pumping noted for all consistencies administered. Pt able to clear all oral residue. Pt demonstrated immediate throat clear or cough for all trials and consistencies this date. SLP also noted multiple swallows per trials and watery eyes. Suspected pharyngeal residue. Rec: MBS to further assess swallow function.    Recommended Diet  Recommendations: NPO;Modified barium swallow study;Dysphagia treatment  Diet Solids Recommendation: NPO  Liquid Consistency Recommendation: NPO  Recommended Form of Meds: Via alternative means of nutrition         Reason for Referral  Herminio Olguin was 
Patient leaving AMA-IV/telemetry removed. Nursing Supervisor/Hospitalist aware. AMA form signed/witnessed. Son providing ride home.  
Patient more alert at this time, 2nd attempt today at bedside swallow eval, pt failed, coughing on thin liquids. Will await speech eval, pt aware and agreeable   
Patient weaned off of insulin gtt today at 1300. Shortly after,  patient became newly lethargic and confused. Blood glucose 92, called Dr. Henderson and discussed, plan to recheck blood glucose in 1hr and to reassess mentation, if worse, give 1/2 amp D50, then recheck and if mentation continues to worsen get ABG. BG recheck showed BG 71, 1/2 amp D50 given, recheck in 20 minutes showed 120, mentation moderately improved. D/w Dr. Taylor, he asked patient if he consumes alcohol, pt stated that \"once I start drinking beer, I can't really stop.\" CIWA scaled added. Alertness improved, confusion the same/slightly better. Plan to transfer to floor.   
Pt's son Octaviano reports he is not comfortable staying the night in patient's room while patient is being monitored by [employer] camera..Provided education as to use of camera, for pt safety.  Son states\" It is his nurses job to check on him, not a camera\"  Agreed nurse will check on pt at the very least hourly, while the camera provides continuous monitoring.  Son reiterates he does not want camera in the room.  Right of refusal form signed by son and camera removed from room.  Telesitter room staff notified.  
Pulmonary & Critical Care Medicine ICU Progress Note    Subjective:     No events reported overnight.  He does continue on an insulin drip which is currently at 6.8 units/hr. he is requesting to have something to drink.    EXAM:  General: Resting comfortably in bed.  No signs of distress noted.  HEENT: Normocephalic, atraumatic.  Pupils equal round and reactive to light  Lungs : Clear.  No wheezes, rhonchi, or rales.  Heart: Regular rate and rhythm.  S1 and S2  ABD: Soft, nontender, nondistended  Extremities : No edema noted  Neuro: Awake, alert  Skin: No rashes    Recent Labs     05/18/24  0234   PHART 7.013*   UOS7VTP <12*   PO2ART 145*     IV:   insulin 6.9 Units/hr (05/19/24 0740)    dextrose 5 % and 0.45 % NaCl      sodium chloride Stopped (05/18/24 1741)    dextrose 5% and 0.45% NaCl with KCl 20 mEq 150 mL/hr at 05/19/24 0740       Vitals:  BP (!) 148/63   Pulse 77   Temp 97.7 °F (36.5 °C)   Resp 17   Ht 1.702 m (5' 7\")   Wt 77.9 kg (171 lb 11.8 oz)   SpO2 100%   BMI 26.90 kg/m²          Intake/Output Summary (Last 24 hours) at 5/19/2024 0908  Last data filed at 5/19/2024 0740  Gross per 24 hour   Intake 5682.71 ml   Output 2500 ml   Net 3182.71 ml       Medications:  Scheduled Meds:   pantoprazole  40 mg Oral QAM AC    heparin (porcine)  5,000 Units SubCUTAneous 3 times per day    aspirin  81 mg Oral Daily    atorvastatin  20 mg Oral Nightly    dilTIAZem  180 mg Oral Daily    gabapentin  300 mg Oral TID    levothyroxine  150 mcg Oral Daily    lisinopril  10 mg Oral Daily       Labs:   CBC:   Recent Labs     05/18/24  0234 05/18/24  0245   WBC  --  29.4*   HGB 12.7* 11.5*   HCT  --  38.7*   MCV  --  98.5*   PLT  --  329     BMP:   Recent Labs     05/18/24 2001 05/19/24  0420 05/19/24  0726    138 140   K 3.4 4.4 5.3*   * 106 108*   CO2 24 19* 19*   PHOS 1.9* 3.1 2.8   BUN 22 20 18   CREATININE 1.27* 1.00 0.95     LIVER PROFILE:   Recent Labs     05/18/24  0358   AST 14   ALT 11   LIPASE 25 
Speech paged for swallow steve, states they will be up soon this AM  Pt updated  
Spiritual Care Services     Summary of Visit:  Encounter Summary  Encounter Overview/Reason: Follow-up  Service Provided For: Patient  Referral/Consult From: Rounding  Support System: Spouse  Complexity of Encounter: Low                               Spiritual Assessment/Intervention/Outcomes:    Assessment: Unable to assess    Intervention: Sustaining Presence/Ministry of presence    Outcome: Did not respond      Care Plan:               Spiritual Care Services   Electronically signed by Chaplain Cleve on 5/19/2024 at 11:32 AM.    To reach a  for emotional and spiritual support, place an EPIC consult request.   If a  is needed immediately, dial “0” and ask to page the on-call .   
When pt's son left the bedside, pt attempt to get out of bed on his own, noted to be a high fall risk as he only A&O x2 this morning, impulsive. Per nightshift RN patient also pulled two IVs out last night. [email or web address] camera placed in room for patient safety/monitoring for medical-device interference. Son at bedside updated.    Mag 1.6- replacement ordered, new 20G IV placed.    Pt hooked up to external catheter, incontinent of urine.    Pt noted to cough when taking pills this morning, NP notified, order received for speech eval. Son at bedside updated.     Dr. Pierre notified son at bedside would like an update/information on plan for his dad. States he will see him shortly/during rounds. 1416- pt requiring sternal rub at this time, will mumble when sternal rubbed, not opening eyes at this time, remains lethargic. Dr. Pierre aware  
  BILITOT 0.3   ALKPHOS 68       Current Facility-Administered Medications   Medication Dose Route Frequency Provider Last Rate Last Admin    magnesium sulfate 2000 mg in 50 mL IVPB premix  2,000 mg IntraVENous Once IgnacioHernandez R, DO 25 mL/hr at 05/20/24 1217 2,000 mg at 05/20/24 1217    enoxaparin (LOVENOX) injection 40 mg  40 mg SubCUTAneous Daily Ignacio, Yazid R, DO        gabapentin (NEURONTIN) capsule 100 mg  100 mg Oral TID Ignacio, Yazid R, DO        LORazepam (ATIVAN) tablet 1 mg  1 mg Oral Q8H Ignacio, Yakaye R, DO        PN-Adult Premix (4.25/10) Peripheral Line with electrolytes   IntraVENous Continuous TPN Adrien Ellis MD        insulin lispro (HUMALOG,ADMELOG) injection vial 0-8 Units  0-8 Units SubCUTAneous TID WC Adrien Ellis MD        insulin lispro (HUMALOG,ADMELOG) injection vial 0-4 Units  0-4 Units SubCUTAneous Nightly Adrien Ellis MD        insulin glargine (LANTUS) injection vial 20 Units  20 Units SubCUTAneous Daily Tommy Taylor MD   20 Units at 05/20/24 0821    glucose chewable tablet 16 g  4 tablet Oral PRN Hilda Henderson,         dextrose bolus 10% 125 mL  125 mL IntraVENous PRN Hilda Henderson DO        Or    dextrose bolus 10% 250 mL  250 mL IntraVENous PRN Hilda Henderson,         glucagon injection 1 mg  1 mg SubCUTAneous PRN Hilda Henderson,         dextrose 10 % infusion   IntraVENous Continuous PRN Hilda Henderson,         sodium chloride flush 0.9 % injection 5-40 mL  5-40 mL IntraVENous 2 times per day Tommy Taylor MD   5 mL at 05/20/24 0836    sodium chloride flush 0.9 % injection 5-40 mL  5-40 mL IntraVENous PRN Tommy Taylor MD        0.9 % sodium chloride infusion   IntraVENous PRN Tommy Taylor MD        thiamine (B-1) injection 100 mg  100 mg IntraVENous Daily Tommy Taylor MD   100 mg at 05/20/24 0818    multivitamin 1 tablet  1 tablet Oral Daily Tommy Taylor MD   1 tablet at 05/20/24 0822    lactated ringers IV soln 
4.0 4.1    105  105 103 102   CO2 21 24  23 27 28   BUN 14 12  12 11 12   CREATININE 0.84 0.80  0.83 0.80 0.65*   GLUCOSE 152* 138*  140* 173* 290*   PHOS 2.5 2.0*  2.0* 1.9*  --    MG 1.7 1.6*  1.6* 1.6* 1.7     PT/INR:No results for input(s): \"PROTIME\", \"INR\" in the last 72 hours.  APTT:No results for input(s): \"APTT\" in the last 72 hours.  LIVER PROFILE:No results for input(s): \"AST\", \"ALT\", \"BILIDIR\", \"BILITOT\", \"ALKPHOS\" in the last 72 hours.    Imaging Last 24 Hours:  Fluoroscopy modified barium swallow with video    Result Date: 5/21/2024  EXAMINATION: MODIFIED BARIUM SWALLOW WAS PERFORMED IN CONJUNCTION WITH SPEECH PATHOLOGY SERVICES TECHNIQUE: Under fluoroscopic evaluation cineradiography/videoradiography recordings were performed in conjunction with the speech-language pathologist (SLP). Various liquid, solid and/or semi-solid barium preparations were used to assess swallowing function. FLUOROSCOPY DOSE AND TYPE: Radiation Exposure Index: Kerma mGy, 1.1 COMPARISON: None HISTORY: ORDERING SYSTEM PROVIDED HISTORY: Failed BSE, per ST recommendations TECHNOLOGIST PROVIDED HISTORY: Reason for exam:->Failed BSE, per ST recommendations What reading provider will be dictating this exam?->CRC FINDINGS: Dysphagia is observed     Dysphagia is observed Please see separate speech pathology report for full discussion of findings and recommendations.     Echo (TTE) complete (PRN contrast/bubble/strain/3D)    Result Date: 5/20/2024    Left Ventricle: Normal left ventricular systolic function with a visually estimated EF of 60 - 65%. Left ventricle size is normal. Normal wall thickness. Normal wall motion. Abnormal diastolic function.   Mitral Valve: Mild annular calcification of the mitral valve. Mild to moderate regurgitation with an eccentrically directed jet and may underestimate severity.   Tricuspid Valve: Normal RVSP. The estimated RVSP is 27 mmHg.   Image quality is adequate. Procedure performed with 
exam?->CRC    FINDINGS:  Single AP erect portable chest demonstrate satisfactory expansion lungs which  are clear.  The cardiac silhouette appears unremarkable.  There is no  evidence of a pneumothorax.    Impression  No acute cardiopulmonary disease.      Echo No results found for this or any previous visit.            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  POA adult son present at bedside in agreement with admission and treatment.  Empiric dose of broad-spectrum antibiotic coverage for significant leukocytosis of unclear etiology with no evidence for acute infectious process other than the leukocytosis itself.  Follow cultures, check procalcitonin, look for other possible sources of active infection and if found proceed with ongoing antibiotic therapy, otherwise defer as a suspected reactive leukocytosis.  Low suspicion for ACS with very mild high-sensitivity troponin elevation, suspected due to impaired renal clearance.  Endocrinology, diabetic education, and critical care consultations  Lovenox for DVT prophylaxis  Continue/hold other home medications as ordered after medication history completed  5/19 - charwa started for early 
Negative.    Genitourinary: Negative.    Musculoskeletal: Negative.    Skin: Negative.  Negative for rash.   Allergic/Immunologic: Negative.    Neurological: Negative.  Negative for dizziness, weakness and headaches.   Hematological: Negative.    Psychiatric/Behavioral: Negative.              VITALS:  Blood pressure (!) 148/63, pulse 77, temperature 97.7 °F (36.5 °C), resp. rate 17, height 1.702 m (5' 7\"), weight 77.9 kg (171 lb 11.8 oz), SpO2 100 %.  Body mass index is 26.9 kg/m².     Physical Exam  Vitals and nursing note reviewed.   Constitutional:       Appearance: He is well-developed. He is not diaphoretic.   HENT:      Head: Normocephalic and atraumatic.   Eyes:      Pupils: Pupils are equal, round, and reactive to light.   Neck:      Thyroid: No thyromegaly.      Vascular: No JVD.      Trachea: No tracheal deviation.   Cardiovascular:      Rate and Rhythm: Normal rate and regular rhythm.      Chest Wall: PMI is not displaced.      Pulses: Intact distal pulses.           Dorsalis pedis pulses are 0 on the right side and 2+ on the left side.        Posterior tibial pulses are 0 on the right side and 0 on the left side.      Heart sounds: Normal heart sounds. Heart sounds not distant. No murmur heard.     No friction rub. No gallop. No S3 sounds.   Pulmonary:      Effort: No respiratory distress.      Breath sounds: No wheezing or rales.   Chest:      Chest wall: No tenderness.   Abdominal:      General: Bowel sounds are normal. There is no distension.      Palpations: Abdomen is soft. There is no mass.      Tenderness: There is no abdominal tenderness. There is no guarding or rebound.   Musculoskeletal:         General: Normal range of motion.      Cervical back: Normal range of motion and neck supple.   Skin:     General: Skin is warm and dry.      Coloration: Skin is not pale.      Findings: No erythema or rash.   Neurological:      Mental Status: He is alert and oriented to person, place, and time.      
discrete mass or duct dilation was observed.  The gallbladder appears unremarkable. The visualized pancreas, spleen and adrenal glands appear within the normal range.  The kidneys reveal symmetrical density without stones or hydronephrosis.  There is an extrarenal pelvis of the left renal collecting system.  No obstructing stones are observed. GI/Bowel: The stomach appears distended and fluid-filled.  The small bowel pattern is nonobstructive.  The region of the terminal ileum cecum and appendix demonstrate no acute abnormalities the colonic gas pattern is within the normal range otherwise. Pelvis: There is a Mobley catheter within the urinary bladder.  The prostate gland is not appear enlarged.  There are no signs of significant lymphadenopathy or ascites within the pelvis Peritoneum/Retroperitoneum: There are no signs of retroperitoneal lymphadenopathy aneurysm or signs of ascites.  The aorta and iliac vessels are heavily calcified compatible with diabetes mellitus or severe peripheral vascular arterial disease Bones/Soft Tissues: No acute abnormalities of the spine or pelvis are identified      1. No acute intra-abdominal or pelvic process. 2. Dense appearance of the liver suggests early signs of steatosis.      CT HEAD WO CONTRAST     Result Date: 5/18/2024  EXAMINATION: CT OF THE HEAD WITHOUT CONTRAST  5/18/2024 5:01 am TECHNIQUE: CT of the head was performed without the administration of intravenous contrast. Automated exposure control, iterative reconstruction, and/or weight based adjustment of the mA/kV was utilized to reduce the radiation dose to as low as reasonably achievable. COMPARISON: None. HISTORY: ORDERING SYSTEM PROVIDED HISTORY: altered mental status TECHNOLOGIST PROVIDED HISTORY: Reason for exam:->altered mental status Has a \"code stroke\" or \"stroke alert\" been called?->No Decision Support Exception - unselect if not a suspected or confirmed emergency medical condition->Emergency Medical Condition

## 2024-05-23 LAB
BACTERIA BLD CULT ORG #2: NORMAL
BACTERIA BLD CULT: NORMAL

## 2024-05-24 LAB — ACHR BLOCK AB/ACHR TOTAL SFR SER: 2 % (ref 0–26)

## 2024-05-24 NOTE — DISCHARGE SUMMARY
ADVICE.    Exam on discharge:   /84   Pulse 97   Temp 98.6 °F (37 °C) (Oral)   Resp 18   Ht 1.702 m (5' 7\")   Wt 74.9 kg (165 lb 1.6 oz)   SpO2 98%   BMI 25.84 kg/m²     See exam and progress note from day of discharge  Patient was seen by the following consultants   Consults:  IP CONSULT TO ENDOCRINOLOGY  IP CONSULT TO CRITICAL CARE  IP CONSULT TO DIABETES EDUCATOR  IP CONSULT TO CARDIOLOGY  IP CONSULT TO SOCIAL WORK  IP CONSULT TO NEUROLOGY    Significant Diagnostic Studies:    Refer to chart     Please refer to chart if no studies are shown here    Fluoroscopy modified barium swallow with video    Result Date: 5/21/2024  EXAMINATION: MODIFIED BARIUM SWALLOW WAS PERFORMED IN CONJUNCTION WITH SPEECH PATHOLOGY SERVICES TECHNIQUE: Under fluoroscopic evaluation cineradiography/videoradiography recordings were performed in conjunction with the speech-language pathologist (SLP). Various liquid, solid and/or semi-solid barium preparations were used to assess swallowing function. FLUOROSCOPY DOSE AND TYPE: Radiation Exposure Index: Kerma mGy, 1.1 COMPARISON: None HISTORY: ORDERING SYSTEM PROVIDED HISTORY: Failed BSE, per ST recommendations TECHNOLOGIST PROVIDED HISTORY: Reason for exam:->Failed BSE, per ST recommendations What reading provider will be dictating this exam?->CRC FINDINGS: Dysphagia is observed     Dysphagia is observed Please see separate speech pathology report for full discussion of findings and recommendations.     Echo (TTE) complete (PRN contrast/bubble/strain/3D)    Result Date: 5/20/2024    Left Ventricle: Normal left ventricular systolic function with a visually estimated EF of 60 - 65%. Left ventricle size is normal. Normal wall thickness. Normal wall motion. Abnormal diastolic function.   Mitral Valve: Mild annular calcification of the mitral valve. Mild to moderate regurgitation with an eccentrically directed jet and may underestimate severity.   Tricuspid Valve: Normal RVSP. The

## 2024-05-27 LAB — ACHR BIND AB SER-SCNC: 0 NMOL/L (ref 0–0.4)

## 2025-01-28 NOTE — FLOWSHEET NOTE
Summary:   Assessment initiated.  Medicated per CIWA as needed.  Son at bedside.  Mobley removed at 2030 with good tolerance.  Urinal at bedside. Turns self.     No

## 2025-04-10 ENCOUNTER — APPOINTMENT (OUTPATIENT)
Dept: RADIOLOGY | Facility: HOSPITAL | Age: 70
DRG: 280 | End: 2025-04-10
Payer: MEDICARE

## 2025-04-10 ENCOUNTER — APPOINTMENT (OUTPATIENT)
Dept: CARDIOLOGY | Facility: HOSPITAL | Age: 70
DRG: 280 | End: 2025-04-10
Payer: MEDICARE

## 2025-04-10 ENCOUNTER — HOSPITAL ENCOUNTER (INPATIENT)
Facility: HOSPITAL | Age: 70
Discharge: HOME | DRG: 321 | End: 2025-04-10
Attending: STUDENT IN AN ORGANIZED HEALTH CARE EDUCATION/TRAINING PROGRAM | Admitting: STUDENT IN AN ORGANIZED HEALTH CARE EDUCATION/TRAINING PROGRAM
Payer: MEDICARE

## 2025-04-10 DIAGNOSIS — E83.51 HYPOCALCEMIA: ICD-10-CM

## 2025-04-10 DIAGNOSIS — E87.6 HYPOKALEMIA: ICD-10-CM

## 2025-04-10 DIAGNOSIS — S16.1XXA ACUTE CERVICAL MYOFASCIAL STRAIN, INITIAL ENCOUNTER: ICD-10-CM

## 2025-04-10 DIAGNOSIS — R55 SYNCOPE AND COLLAPSE: ICD-10-CM

## 2025-04-10 DIAGNOSIS — N17.9 ACUTE RENAL FAILURE, UNSPECIFIED ACUTE RENAL FAILURE TYPE: ICD-10-CM

## 2025-04-10 DIAGNOSIS — R73.9 HYPERGLYCEMIA: ICD-10-CM

## 2025-04-10 DIAGNOSIS — I21.4 NSTEMI (NON-ST ELEVATED MYOCARDIAL INFARCTION) (MULTI): Primary | ICD-10-CM

## 2025-04-10 DIAGNOSIS — S09.90XA CLOSED HEAD INJURY, INITIAL ENCOUNTER: ICD-10-CM

## 2025-04-10 DIAGNOSIS — S22.41XA CLOSED FRACTURE OF MULTIPLE RIBS OF RIGHT SIDE, INITIAL ENCOUNTER: ICD-10-CM

## 2025-04-10 DIAGNOSIS — E83.42 HYPOMAGNESEMIA: ICD-10-CM

## 2025-04-10 LAB
ACT BLD: >397 SEC (ref 83–199)
ALBUMIN SERPL BCP-MCNC: 3.4 G/DL (ref 3.4–5)
ALP SERPL-CCNC: 44 U/L (ref 33–136)
ALT SERPL W P-5'-P-CCNC: 13 U/L (ref 10–52)
AMPHETAMINES UR QL SCN: NORMAL
ANION GAP BLDV CALCULATED.4IONS-SCNC: 14 MMOL/L (ref 10–25)
ANION GAP SERPL CALC-SCNC: 17 MMOL/L (ref 10–20)
AORTIC VALVE MEAN GRADIENT: 4 MMHG
AORTIC VALVE PEAK VELOCITY: 1.27 M/S
APPEARANCE UR: ABNORMAL
AST SERPL W P-5'-P-CCNC: 39 U/L (ref 9–39)
ATRIAL RATE: 83 BPM
ATRIAL RATE: 83 BPM
ATRIAL RATE: 86 BPM
AV PEAK GRADIENT: 6 MMHG
AVA (PEAK VEL): 2.72 CM2
AVA (VTI): 2.82 CM2
B-OH-BUTYR SERPL-SCNC: 0.52 MMOL/L (ref 0.02–0.27)
BARBITURATES UR QL SCN: NORMAL
BASE EXCESS BLDV CALC-SCNC: 1.9 MMOL/L (ref -2–3)
BASOPHILS # BLD AUTO: 0.02 X10*3/UL (ref 0–0.1)
BASOPHILS NFR BLD AUTO: 0.1 %
BENZODIAZ UR QL SCN: NORMAL
BILIRUB DIRECT SERPL-MCNC: 0.1 MG/DL (ref 0–0.3)
BILIRUB SERPL-MCNC: 0.5 MG/DL (ref 0–1.2)
BILIRUB UR STRIP.AUTO-MCNC: NEGATIVE MG/DL
BODY TEMPERATURE: ABNORMAL
BUN SERPL-MCNC: 54 MG/DL (ref 6–23)
BZE UR QL SCN: NORMAL
CA-I BLD-SCNC: 1.05 MMOL/L (ref 1.1–1.33)
CA-I BLDV-SCNC: 1.07 MMOL/L (ref 1.1–1.33)
CALCIUM SERPL-MCNC: 7.4 MG/DL (ref 8.6–10.3)
CANNABINOIDS UR QL SCN: NORMAL
CARDIAC TROPONIN I PNL SERPL HS: ABNORMAL NG/L (ref 0–20)
CHLORIDE BLDV-SCNC: 91 MMOL/L (ref 98–107)
CHLORIDE SERPL-SCNC: 97 MMOL/L (ref 98–107)
CO2 SERPL-SCNC: 21 MMOL/L (ref 21–32)
COLOR UR: ABNORMAL
CREAT SERPL-MCNC: 1.99 MG/DL (ref 0.5–1.3)
CRITICAL CALL TIME: 139
CRITICAL CALLED BY: ABNORMAL
CRITICAL CALLED TO: ABNORMAL
CRITICAL READ BACK: ABNORMAL
EGFRCR SERPLBLD CKD-EPI 2021: 35 ML/MIN/1.73M*2
EJECTION FRACTION APICAL 4 CHAMBER: 64.2
EJECTION FRACTION: 53 %
EOSINOPHIL # BLD AUTO: 0 X10*3/UL (ref 0–0.7)
EOSINOPHIL NFR BLD AUTO: 0 %
ERYTHROCYTE [DISTWIDTH] IN BLOOD BY AUTOMATED COUNT: 11.6 % (ref 11.5–14.5)
FENTANYL+NORFENTANYL UR QL SCN: NORMAL
GLUCOSE BLD MANUAL STRIP-MCNC: 160 MG/DL (ref 74–99)
GLUCOSE BLD MANUAL STRIP-MCNC: 261 MG/DL (ref 74–99)
GLUCOSE BLD MANUAL STRIP-MCNC: 265 MG/DL (ref 74–99)
GLUCOSE BLD MANUAL STRIP-MCNC: 338 MG/DL (ref 74–99)
GLUCOSE BLD MANUAL STRIP-MCNC: 98 MG/DL (ref 74–99)
GLUCOSE BLDV-MCNC: 247 MG/DL (ref 74–99)
GLUCOSE SERPL-MCNC: 221 MG/DL (ref 74–99)
GLUCOSE UR STRIP.AUTO-MCNC: ABNORMAL MG/DL
HCO3 BLDV-SCNC: 27.8 MMOL/L (ref 22–26)
HCT VFR BLD AUTO: 34 % (ref 41–52)
HCT VFR BLD EST: 45 % (ref 41–52)
HGB BLD-MCNC: 12.5 G/DL (ref 13.5–17.5)
HGB BLDV-MCNC: 15 G/DL (ref 13.5–17.5)
HOLD SPECIMEN: NORMAL
IMM GRANULOCYTES # BLD AUTO: 0.3 X10*3/UL (ref 0–0.7)
IMM GRANULOCYTES NFR BLD AUTO: 1.5 % (ref 0–0.9)
INHALED O2 CONCENTRATION: 21 %
INR PPP: 1 (ref 0.9–1.1)
KETONES UR STRIP.AUTO-MCNC: ABNORMAL MG/DL
LACTATE BLDV-SCNC: 3.5 MMOL/L (ref 0.4–2)
LACTATE BLDV-SCNC: 5.1 MMOL/L (ref 0.4–2)
LACTATE SERPL-SCNC: 3.5 MMOL/L (ref 0.4–2)
LACTATE SERPL-SCNC: 4.8 MMOL/L (ref 0.4–2)
LEFT ATRIUM VOLUME AREA LENGTH INDEX BSA: 22.4 ML/M2
LEFT VENTRICLE INTERNAL DIMENSION DIASTOLE: 4 CM (ref 3.5–6)
LEFT VENTRICULAR OUTFLOW TRACT DIAMETER: 2 CM
LEUKOCYTE ESTERASE UR QL STRIP.AUTO: NEGATIVE
LIPASE SERPL-CCNC: 70 U/L (ref 9–82)
LV EJECTION FRACTION BIPLANE: 62 %
LYMPHOCYTES # BLD AUTO: 0.9 X10*3/UL (ref 1.2–4.8)
LYMPHOCYTES NFR BLD AUTO: 4.4 %
MAGNESIUM SERPL-MCNC: 1.27 MG/DL (ref 1.6–2.4)
MCH RBC QN AUTO: 28.9 PG (ref 26–34)
MCHC RBC AUTO-ENTMCNC: 36.8 G/DL (ref 32–36)
MCV RBC AUTO: 79 FL (ref 80–100)
METHADONE UR QL SCN: NORMAL
MITRAL VALVE E/A RATIO: 1.17
MONOCYTES # BLD AUTO: 1.82 X10*3/UL (ref 0.1–1)
MONOCYTES NFR BLD AUTO: 8.9 %
NEUTROPHILS # BLD AUTO: 17.43 X10*3/UL (ref 1.2–7.7)
NEUTROPHILS NFR BLD AUTO: 85.1 %
NITRITE UR QL STRIP.AUTO: NEGATIVE
NRBC BLD-RTO: 0 /100 WBCS (ref 0–0)
OPIATES UR QL SCN: NORMAL
OXYCODONE+OXYMORPHONE UR QL SCN: NORMAL
OXYHGB MFR BLDV: 33.6 % (ref 45–75)
P AXIS: 111 DEGREES
P AXIS: 15 DEGREES
P AXIS: 76 DEGREES
P OFFSET: 185 MS
P OFFSET: 190 MS
P OFFSET: 190 MS
P ONSET: 134 MS
P ONSET: 135 MS
P ONSET: 136 MS
PCO2 BLDV: 47 MM HG (ref 41–51)
PCP UR QL SCN: NORMAL
PH BLDV: 7.38 PH (ref 7.33–7.43)
PH UR STRIP.AUTO: 5 [PH]
PHOSPHATE SERPL-MCNC: 2.7 MG/DL (ref 2.5–4.9)
PLATELET # BLD AUTO: 283 X10*3/UL (ref 150–450)
PO2 BLDV: 20 MM HG (ref 35–45)
POTASSIUM BLDV-SCNC: 3.3 MMOL/L (ref 3.5–5.3)
POTASSIUM SERPL-SCNC: 2.8 MMOL/L (ref 3.5–5.3)
PR INTERVAL: 162 MS
PR INTERVAL: 172 MS
PR INTERVAL: 174 MS
PROT SERPL-MCNC: 5.5 G/DL (ref 6.4–8.2)
PROT UR STRIP.AUTO-MCNC: NEGATIVE MG/DL
PROTHROMBIN TIME: 11 SECONDS (ref 9.8–12.4)
Q ONSET: 216 MS
Q ONSET: 221 MS
Q ONSET: 222 MS
QRS COUNT: 13 BEATS
QRS COUNT: 14 BEATS
QRS COUNT: 15 BEATS
QRS DURATION: 84 MS
QRS DURATION: 90 MS
QRS DURATION: 96 MS
QT INTERVAL: 368 MS
QT INTERVAL: 370 MS
QT INTERVAL: 390 MS
QTC CALCULATION(BAZETT): 432 MS
QTC CALCULATION(BAZETT): 442 MS
QTC CALCULATION(BAZETT): 458 MS
QTC FREDERICIA: 410 MS
QTC FREDERICIA: 417 MS
QTC FREDERICIA: 434 MS
R AXIS: 46 DEGREES
R AXIS: 92 DEGREES
R AXIS: 97 DEGREES
RBC # BLD AUTO: 4.32 X10*6/UL (ref 4.5–5.9)
RBC # UR STRIP.AUTO: NEGATIVE MG/DL
RIGHT VENTRICLE FREE WALL PEAK S': 14 CM/S
RIGHT VENTRICLE PEAK SYSTOLIC PRESSURE: 24 MMHG
SAO2 % BLDV: 34 % (ref 45–75)
SODIUM BLDV-SCNC: 129 MMOL/L (ref 136–145)
SODIUM SERPL-SCNC: 132 MMOL/L (ref 136–145)
SP GR UR STRIP.AUTO: 1.01
T AXIS: 109 DEGREES
T AXIS: 44 DEGREES
T AXIS: 72 DEGREES
T OFFSET: 405 MS
T OFFSET: 407 MS
T OFFSET: 411 MS
TRICUSPID ANNULAR PLANE SYSTOLIC EXCURSION: 1.8 CM
UFH PPP CHRO-ACNC: 0.4 IU/ML
UFH PPP CHRO-ACNC: 0.6 IU/ML
UROBILINOGEN UR STRIP.AUTO-MCNC: NORMAL MG/DL
VENTRICULAR RATE: 83 BPM
VENTRICULAR RATE: 83 BPM
VENTRICULAR RATE: 86 BPM
WBC # BLD AUTO: 20.5 X10*3/UL (ref 4.4–11.3)

## 2025-04-10 PROCEDURE — 87040 BLOOD CULTURE FOR BACTERIA: CPT | Mod: ELYLAB | Performed by: PHYSICIAN ASSISTANT

## 2025-04-10 PROCEDURE — 93010 ELECTROCARDIOGRAM REPORT: CPT | Performed by: INTERNAL MEDICINE

## 2025-04-10 PROCEDURE — 2550000001 HC RX 255 CONTRASTS: Performed by: INTERNAL MEDICINE

## 2025-04-10 PROCEDURE — 84484 ASSAY OF TROPONIN QUANT: CPT | Performed by: PHYSICIAN ASSISTANT

## 2025-04-10 PROCEDURE — C1887 CATHETER, GUIDING: HCPCS | Performed by: INTERNAL MEDICINE

## 2025-04-10 PROCEDURE — 82248 BILIRUBIN DIRECT: CPT | Performed by: PHYSICIAN ASSISTANT

## 2025-04-10 PROCEDURE — 2060000001 HC INTERMEDIATE ICU ROOM DAILY

## 2025-04-10 PROCEDURE — 99223 1ST HOSP IP/OBS HIGH 75: CPT | Performed by: NURSE PRACTITIONER

## 2025-04-10 PROCEDURE — 2500000001 HC RX 250 WO HCPCS SELF ADMINISTERED DRUGS (ALT 637 FOR MEDICARE OP): Performed by: INTERNAL MEDICINE

## 2025-04-10 PROCEDURE — 2500000001 HC RX 250 WO HCPCS SELF ADMINISTERED DRUGS (ALT 637 FOR MEDICARE OP): Performed by: STUDENT IN AN ORGANIZED HEALTH CARE EDUCATION/TRAINING PROGRAM

## 2025-04-10 PROCEDURE — C9600 PERC DRUG-EL COR STENT SING: HCPCS | Mod: LD | Performed by: INTERNAL MEDICINE

## 2025-04-10 PROCEDURE — 83690 ASSAY OF LIPASE: CPT | Performed by: PHYSICIAN ASSISTANT

## 2025-04-10 PROCEDURE — 84132 ASSAY OF SERUM POTASSIUM: CPT | Performed by: PHYSICIAN ASSISTANT

## 2025-04-10 PROCEDURE — 82010 KETONE BODYS QUAN: CPT | Performed by: PHYSICIAN ASSISTANT

## 2025-04-10 PROCEDURE — 93005 ELECTROCARDIOGRAM TRACING: CPT

## 2025-04-10 PROCEDURE — 7100000010 HC PHASE TWO TIME - EACH INCREMENTAL 1 MINUTE: Performed by: INTERNAL MEDICINE

## 2025-04-10 PROCEDURE — 027035Z DILATION OF CORONARY ARTERY, ONE ARTERY WITH TWO DRUG-ELUTING INTRALUMINAL DEVICES, PERCUTANEOUS APPROACH: ICD-10-PCS | Performed by: INTERNAL MEDICINE

## 2025-04-10 PROCEDURE — 85025 COMPLETE CBC W/AUTO DIFF WBC: CPT | Performed by: PHYSICIAN ASSISTANT

## 2025-04-10 PROCEDURE — 7100000001 HC RECOVERY ROOM TIME - INITIAL BASE CHARGE: Performed by: INTERNAL MEDICINE

## 2025-04-10 PROCEDURE — 2780000003 HC OR 278 NO HCPCS: Performed by: INTERNAL MEDICINE

## 2025-04-10 PROCEDURE — C1894 INTRO/SHEATH, NON-LASER: HCPCS | Performed by: INTERNAL MEDICINE

## 2025-04-10 PROCEDURE — 83605 ASSAY OF LACTIC ACID: CPT | Performed by: PHYSICIAN ASSISTANT

## 2025-04-10 PROCEDURE — 2500000004 HC RX 250 GENERAL PHARMACY W/ HCPCS (ALT 636 FOR OP/ED): Performed by: PHYSICIAN ASSISTANT

## 2025-04-10 PROCEDURE — 72128 CT CHEST SPINE W/O DYE: CPT | Performed by: STUDENT IN AN ORGANIZED HEALTH CARE EDUCATION/TRAINING PROGRAM

## 2025-04-10 PROCEDURE — C1769 GUIDE WIRE: HCPCS | Performed by: INTERNAL MEDICINE

## 2025-04-10 PROCEDURE — 82947 ASSAY GLUCOSE BLOOD QUANT: CPT

## 2025-04-10 PROCEDURE — 72170 X-RAY EXAM OF PELVIS: CPT | Performed by: STUDENT IN AN ORGANIZED HEALTH CARE EDUCATION/TRAINING PROGRAM

## 2025-04-10 PROCEDURE — 96374 THER/PROPH/DIAG INJ IV PUSH: CPT | Mod: 59

## 2025-04-10 PROCEDURE — 2500000004 HC RX 250 GENERAL PHARMACY W/ HCPCS (ALT 636 FOR OP/ED): Performed by: INTERNAL MEDICINE

## 2025-04-10 PROCEDURE — 2720000007 HC OR 272 NO HCPCS: Performed by: INTERNAL MEDICINE

## 2025-04-10 PROCEDURE — 99153 MOD SED SAME PHYS/QHP EA: CPT | Performed by: INTERNAL MEDICINE

## 2025-04-10 PROCEDURE — 83735 ASSAY OF MAGNESIUM: CPT | Performed by: PHYSICIAN ASSISTANT

## 2025-04-10 PROCEDURE — 72125 CT NECK SPINE W/O DYE: CPT | Performed by: STUDENT IN AN ORGANIZED HEALTH CARE EDUCATION/TRAINING PROGRAM

## 2025-04-10 PROCEDURE — 72131 CT LUMBAR SPINE W/O DYE: CPT | Performed by: STUDENT IN AN ORGANIZED HEALTH CARE EDUCATION/TRAINING PROGRAM

## 2025-04-10 PROCEDURE — 85347 COAGULATION TIME ACTIVATED: CPT

## 2025-04-10 PROCEDURE — 85520 HEPARIN ASSAY: CPT | Performed by: STUDENT IN AN ORGANIZED HEALTH CARE EDUCATION/TRAINING PROGRAM

## 2025-04-10 PROCEDURE — 2500000002 HC RX 250 W HCPCS SELF ADMINISTERED DRUGS (ALT 637 FOR MEDICARE OP, ALT 636 FOR OP/ED): Performed by: STUDENT IN AN ORGANIZED HEALTH CARE EDUCATION/TRAINING PROGRAM

## 2025-04-10 PROCEDURE — 71045 X-RAY EXAM CHEST 1 VIEW: CPT

## 2025-04-10 PROCEDURE — 74176 CT ABD & PELVIS W/O CONTRAST: CPT

## 2025-04-10 PROCEDURE — C1725 CATH, TRANSLUMIN NON-LASER: HCPCS | Performed by: INTERNAL MEDICINE

## 2025-04-10 PROCEDURE — 84484 ASSAY OF TROPONIN QUANT: CPT | Performed by: STUDENT IN AN ORGANIZED HEALTH CARE EDUCATION/TRAINING PROGRAM

## 2025-04-10 PROCEDURE — 71045 X-RAY EXAM CHEST 1 VIEW: CPT | Performed by: STUDENT IN AN ORGANIZED HEALTH CARE EDUCATION/TRAINING PROGRAM

## 2025-04-10 PROCEDURE — 84100 ASSAY OF PHOSPHORUS: CPT | Performed by: PHYSICIAN ASSISTANT

## 2025-04-10 PROCEDURE — 2500000002 HC RX 250 W HCPCS SELF ADMINISTERED DRUGS (ALT 637 FOR MEDICARE OP, ALT 636 FOR OP/ED): Performed by: NURSE PRACTITIONER

## 2025-04-10 PROCEDURE — B2111ZZ FLUOROSCOPY OF MULTIPLE CORONARY ARTERIES USING LOW OSMOLAR CONTRAST: ICD-10-PCS | Performed by: INTERNAL MEDICINE

## 2025-04-10 PROCEDURE — 80307 DRUG TEST PRSMV CHEM ANLYZR: CPT | Performed by: STUDENT IN AN ORGANIZED HEALTH CARE EDUCATION/TRAINING PROGRAM

## 2025-04-10 PROCEDURE — 99152 MOD SED SAME PHYS/QHP 5/>YRS: CPT | Performed by: INTERNAL MEDICINE

## 2025-04-10 PROCEDURE — 96375 TX/PRO/DX INJ NEW DRUG ADDON: CPT | Mod: 59

## 2025-04-10 PROCEDURE — 85347 COAGULATION TIME ACTIVATED: CPT | Performed by: INTERNAL MEDICINE

## 2025-04-10 PROCEDURE — 71250 CT THORAX DX C-: CPT | Performed by: STUDENT IN AN ORGANIZED HEALTH CARE EDUCATION/TRAINING PROGRAM

## 2025-04-10 PROCEDURE — 82330 ASSAY OF CALCIUM: CPT | Performed by: PHYSICIAN ASSISTANT

## 2025-04-10 PROCEDURE — 72170 X-RAY EXAM OF PELVIS: CPT

## 2025-04-10 PROCEDURE — 2500000004 HC RX 250 GENERAL PHARMACY W/ HCPCS (ALT 636 FOR OP/ED): Performed by: STUDENT IN AN ORGANIZED HEALTH CARE EDUCATION/TRAINING PROGRAM

## 2025-04-10 PROCEDURE — 96361 HYDRATE IV INFUSION ADD-ON: CPT | Mod: 59

## 2025-04-10 PROCEDURE — 85610 PROTHROMBIN TIME: CPT | Performed by: PHYSICIAN ASSISTANT

## 2025-04-10 PROCEDURE — 36415 COLL VENOUS BLD VENIPUNCTURE: CPT | Performed by: STUDENT IN AN ORGANIZED HEALTH CARE EDUCATION/TRAINING PROGRAM

## 2025-04-10 PROCEDURE — 99291 CRITICAL CARE FIRST HOUR: CPT | Mod: 25 | Performed by: PHYSICIAN ASSISTANT

## 2025-04-10 PROCEDURE — C1874 STENT, COATED/COV W/DEL SYS: HCPCS | Performed by: INTERNAL MEDICINE

## 2025-04-10 PROCEDURE — 93306 TTE W/DOPPLER COMPLETE: CPT

## 2025-04-10 PROCEDURE — 96365 THER/PROPH/DIAG IV INF INIT: CPT | Mod: 59

## 2025-04-10 PROCEDURE — 72125 CT NECK SPINE W/O DYE: CPT

## 2025-04-10 PROCEDURE — 4A023N7 MEASUREMENT OF CARDIAC SAMPLING AND PRESSURE, LEFT HEART, PERCUTANEOUS APPROACH: ICD-10-PCS | Performed by: INTERNAL MEDICINE

## 2025-04-10 PROCEDURE — 70450 CT HEAD/BRAIN W/O DYE: CPT

## 2025-04-10 PROCEDURE — 7100000009 HC PHASE TWO TIME - INITIAL BASE CHARGE: Performed by: INTERNAL MEDICINE

## 2025-04-10 PROCEDURE — 7100000002 HC RECOVERY ROOM TIME - EACH INCREMENTAL 1 MINUTE: Performed by: INTERNAL MEDICINE

## 2025-04-10 PROCEDURE — 70450 CT HEAD/BRAIN W/O DYE: CPT | Performed by: STUDENT IN AN ORGANIZED HEALTH CARE EDUCATION/TRAINING PROGRAM

## 2025-04-10 PROCEDURE — 93458 L HRT ARTERY/VENTRICLE ANGIO: CPT | Mod: 59 | Performed by: INTERNAL MEDICINE

## 2025-04-10 PROCEDURE — 81003 URINALYSIS AUTO W/O SCOPE: CPT | Performed by: PHYSICIAN ASSISTANT

## 2025-04-10 PROCEDURE — 36415 COLL VENOUS BLD VENIPUNCTURE: CPT | Performed by: PHYSICIAN ASSISTANT

## 2025-04-10 PROCEDURE — 74176 CT ABD & PELVIS W/O CONTRAST: CPT | Performed by: STUDENT IN AN ORGANIZED HEALTH CARE EDUCATION/TRAINING PROGRAM

## 2025-04-10 DEVICE — STENT ONYXNG25030UX ONYX 2.50X30RX
Type: IMPLANTABLE DEVICE | Site: HEART | Status: FUNCTIONAL
Brand: ONYX FRONTIER™

## 2025-04-10 DEVICE — STENT ONYXNG25038UX ONYX 2.50X38RX
Type: IMPLANTABLE DEVICE | Site: CORONARY | Status: FUNCTIONAL
Brand: ONYX FRONTIER™

## 2025-04-10 RX ORDER — HEPARIN SODIUM 10000 [USP'U]/100ML
0-4000 INJECTION, SOLUTION INTRAVENOUS CONTINUOUS
Status: DISCONTINUED | OUTPATIENT
Start: 2025-04-10 | End: 2025-04-11

## 2025-04-10 RX ORDER — DEXTROSE 50 % IN WATER (D50W) INTRAVENOUS SYRINGE
12.5
Status: DISCONTINUED | OUTPATIENT
Start: 2025-04-10 | End: 2025-04-16 | Stop reason: HOSPADM

## 2025-04-10 RX ORDER — ACETAMINOPHEN 325 MG/1
650 TABLET ORAL EVERY 4 HOURS PRN
Status: DISCONTINUED | OUTPATIENT
Start: 2025-04-10 | End: 2025-04-16 | Stop reason: HOSPADM

## 2025-04-10 RX ORDER — HEPARIN SODIUM 1000 [USP'U]/ML
INJECTION, SOLUTION INTRAVENOUS; SUBCUTANEOUS AS NEEDED
Status: DISCONTINUED | OUTPATIENT
Start: 2025-04-10 | End: 2025-04-10 | Stop reason: HOSPADM

## 2025-04-10 RX ORDER — ACETAMINOPHEN 500 MG
10 TABLET ORAL NIGHTLY PRN
Status: DISCONTINUED | OUTPATIENT
Start: 2025-04-10 | End: 2025-04-16 | Stop reason: HOSPADM

## 2025-04-10 RX ORDER — ATORVASTATIN CALCIUM 20 MG/1
20 TABLET, FILM COATED ORAL DAILY
COMMUNITY
End: 2025-04-16 | Stop reason: HOSPADM

## 2025-04-10 RX ORDER — GABAPENTIN 300 MG/1
300 CAPSULE ORAL 3 TIMES DAILY
Status: DISCONTINUED | OUTPATIENT
Start: 2025-04-10 | End: 2025-04-16 | Stop reason: HOSPADM

## 2025-04-10 RX ORDER — FAMOTIDINE 20 MG/1
20 TABLET, FILM COATED ORAL ONCE
Status: COMPLETED | OUTPATIENT
Start: 2025-04-10 | End: 2025-04-10

## 2025-04-10 RX ORDER — LIDOCAINE HYDROCHLORIDE 20 MG/ML
INJECTION, SOLUTION INFILTRATION; PERINEURAL AS NEEDED
Status: DISCONTINUED | OUTPATIENT
Start: 2025-04-10 | End: 2025-04-10 | Stop reason: HOSPADM

## 2025-04-10 RX ORDER — ACETAMINOPHEN 160 MG/5ML
650 SOLUTION ORAL EVERY 4 HOURS PRN
Status: DISCONTINUED | OUTPATIENT
Start: 2025-04-10 | End: 2025-04-16 | Stop reason: HOSPADM

## 2025-04-10 RX ORDER — MAGNESIUM SULFATE HEPTAHYDRATE 40 MG/ML
2 INJECTION, SOLUTION INTRAVENOUS ONCE
Status: COMPLETED | OUTPATIENT
Start: 2025-04-10 | End: 2025-04-10

## 2025-04-10 RX ORDER — DEXTROSE 50 % IN WATER (D50W) INTRAVENOUS SYRINGE
25
Status: DISCONTINUED | OUTPATIENT
Start: 2025-04-10 | End: 2025-04-16 | Stop reason: HOSPADM

## 2025-04-10 RX ORDER — ACETAMINOPHEN 650 MG/1
650 SUPPOSITORY RECTAL EVERY 4 HOURS PRN
Status: DISCONTINUED | OUTPATIENT
Start: 2025-04-10 | End: 2025-04-16 | Stop reason: HOSPADM

## 2025-04-10 RX ORDER — HALOPERIDOL LACTATE 5 MG/ML
2 INJECTION, SOLUTION INTRAMUSCULAR ONCE
Status: COMPLETED | OUTPATIENT
Start: 2025-04-10 | End: 2025-04-10

## 2025-04-10 RX ORDER — POTASSIUM CHLORIDE 14.9 MG/ML
20 INJECTION INTRAVENOUS ONCE
Status: COMPLETED | OUTPATIENT
Start: 2025-04-10 | End: 2025-04-10

## 2025-04-10 RX ORDER — MIDAZOLAM HYDROCHLORIDE 1 MG/ML
INJECTION INTRAMUSCULAR; INTRAVENOUS AS NEEDED
Status: DISCONTINUED | OUTPATIENT
Start: 2025-04-10 | End: 2025-04-10 | Stop reason: HOSPADM

## 2025-04-10 RX ORDER — POLYETHYLENE GLYCOL 3350 17 G/17G
17 POWDER, FOR SOLUTION ORAL DAILY PRN
Status: DISCONTINUED | OUTPATIENT
Start: 2025-04-10 | End: 2025-04-16 | Stop reason: HOSPADM

## 2025-04-10 RX ORDER — SODIUM CHLORIDE 450 MG/100ML
75 INJECTION, SOLUTION INTRAVENOUS CONTINUOUS
Status: ACTIVE | OUTPATIENT
Start: 2025-04-10 | End: 2025-04-11

## 2025-04-10 RX ORDER — VANCOMYCIN/0.9 % SOD CHLORIDE 1.5G/250ML
1.5 PLASTIC BAG, INJECTION (ML) INTRAVENOUS ONCE
Status: COMPLETED | OUTPATIENT
Start: 2025-04-10 | End: 2025-04-10

## 2025-04-10 RX ORDER — MORPHINE SULFATE 4 MG/ML
4 INJECTION, SOLUTION INTRAMUSCULAR; INTRAVENOUS ONCE
Status: DISCONTINUED | OUTPATIENT
Start: 2025-04-10 | End: 2025-04-16 | Stop reason: HOSPADM

## 2025-04-10 RX ORDER — DIPHENHYDRAMINE HCL 25 MG
50 TABLET ORAL ONCE
Status: COMPLETED | OUTPATIENT
Start: 2025-04-10 | End: 2025-04-10

## 2025-04-10 RX ORDER — GABAPENTIN 300 MG/1
300 CAPSULE ORAL 3 TIMES DAILY
Status: ON HOLD | COMMUNITY
End: 2025-04-10 | Stop reason: ENTERED-IN-ERROR

## 2025-04-10 RX ORDER — CLOPIDOGREL BISULFATE 300 MG/1
TABLET, FILM COATED ORAL AS NEEDED
Status: DISCONTINUED | OUTPATIENT
Start: 2025-04-10 | End: 2025-04-10 | Stop reason: HOSPADM

## 2025-04-10 RX ORDER — ATORVASTATIN CALCIUM 20 MG/1
40 TABLET, FILM COATED ORAL NIGHTLY
Status: DISCONTINUED | OUTPATIENT
Start: 2025-04-10 | End: 2025-04-16 | Stop reason: HOSPADM

## 2025-04-10 RX ORDER — CLOPIDOGREL BISULFATE 75 MG/1
75 TABLET ORAL DAILY
Status: DISCONTINUED | OUTPATIENT
Start: 2025-04-11 | End: 2025-04-16 | Stop reason: HOSPADM

## 2025-04-10 RX ORDER — ONDANSETRON HYDROCHLORIDE 2 MG/ML
4 INJECTION, SOLUTION INTRAVENOUS ONCE
Status: COMPLETED | OUTPATIENT
Start: 2025-04-10 | End: 2025-04-10

## 2025-04-10 RX ORDER — FENTANYL CITRATE 50 UG/ML
INJECTION, SOLUTION INTRAMUSCULAR; INTRAVENOUS AS NEEDED
Status: DISCONTINUED | OUTPATIENT
Start: 2025-04-10 | End: 2025-04-10 | Stop reason: HOSPADM

## 2025-04-10 RX ORDER — NAPROXEN SODIUM 220 MG/1
81 TABLET, FILM COATED ORAL ONCE
Status: CANCELLED | OUTPATIENT
Start: 2025-04-10 | End: 2025-04-10

## 2025-04-10 RX ORDER — POTASSIUM CHLORIDE 20 MEQ/1
40 TABLET, EXTENDED RELEASE ORAL ONCE
Status: DISCONTINUED | OUTPATIENT
Start: 2025-04-10 | End: 2025-04-16 | Stop reason: HOSPADM

## 2025-04-10 RX ORDER — INSULIN GLARGINE 100 [IU]/ML
20 INJECTION, SOLUTION SUBCUTANEOUS NIGHTLY
COMMUNITY
Start: 2024-04-24

## 2025-04-10 RX ORDER — NAPROXEN SODIUM 220 MG/1
81 TABLET, FILM COATED ORAL DAILY
Status: DISCONTINUED | OUTPATIENT
Start: 2025-04-10 | End: 2025-04-16 | Stop reason: HOSPADM

## 2025-04-10 RX ORDER — INSULIN LISPRO 100 [IU]/ML
0-5 INJECTION, SOLUTION INTRAVENOUS; SUBCUTANEOUS EVERY 4 HOURS
Status: DISCONTINUED | OUTPATIENT
Start: 2025-04-10 | End: 2025-04-11

## 2025-04-10 RX ORDER — INSULIN GLARGINE 100 [IU]/ML
20 INJECTION, SOLUTION SUBCUTANEOUS NIGHTLY
Status: DISCONTINUED | OUTPATIENT
Start: 2025-04-10 | End: 2025-04-16 | Stop reason: HOSPADM

## 2025-04-10 RX ORDER — LEVOTHYROXINE SODIUM 75 UG/1
150 TABLET ORAL DAILY
Status: DISCONTINUED | OUTPATIENT
Start: 2025-04-10 | End: 2025-04-16 | Stop reason: HOSPADM

## 2025-04-10 RX ORDER — LISINOPRIL 10 MG/1
10 TABLET ORAL DAILY
COMMUNITY

## 2025-04-10 RX ORDER — NITROGLYCERIN 40 MG/100ML
INJECTION INTRAVENOUS AS NEEDED
Status: DISCONTINUED | OUTPATIENT
Start: 2025-04-10 | End: 2025-04-10 | Stop reason: HOSPADM

## 2025-04-10 RX ORDER — LEVOTHYROXINE SODIUM 150 UG/1
150 TABLET ORAL DAILY
COMMUNITY

## 2025-04-10 RX ADMIN — GABAPENTIN 300 MG: 300 CAPSULE ORAL at 11:31

## 2025-04-10 RX ADMIN — SODIUM CHLORIDE 500 ML: 0.9 INJECTION, SOLUTION INTRAVENOUS at 04:20

## 2025-04-10 RX ADMIN — GABAPENTIN 300 MG: 300 CAPSULE ORAL at 21:43

## 2025-04-10 RX ADMIN — SODIUM CHLORIDE, SODIUM LACTATE, POTASSIUM CHLORIDE, AND CALCIUM CHLORIDE 1000 ML: .6; .31; .03; .02 INJECTION, SOLUTION INTRAVENOUS at 01:05

## 2025-04-10 RX ADMIN — FAMOTIDINE 20 MG: 20 TABLET, FILM COATED ORAL at 16:00

## 2025-04-10 RX ADMIN — PREDNISONE 50 MG: 20 TABLET ORAL at 16:00

## 2025-04-10 RX ADMIN — MAGNESIUM SULFATE HEPTAHYDRATE 2 G: 40 INJECTION, SOLUTION INTRAVENOUS at 04:50

## 2025-04-10 RX ADMIN — POTASSIUM CHLORIDE 20 MEQ: 14.9 INJECTION, SOLUTION INTRAVENOUS at 04:50

## 2025-04-10 RX ADMIN — ATORVASTATIN CALCIUM 40 MG: 20 TABLET, FILM COATED ORAL at 21:43

## 2025-04-10 RX ADMIN — PIPERACILLIN AND TAZOBACTAM 4.5 G: 4; .5 INJECTION, POWDER, FOR SOLUTION INTRAVENOUS at 04:57

## 2025-04-10 RX ADMIN — ASPIRIN 81 MG: 81 TABLET, CHEWABLE ORAL at 11:31

## 2025-04-10 RX ADMIN — HEPARIN SODIUM 700 UNITS/HR: 10000 INJECTION, SOLUTION INTRAVENOUS at 03:28

## 2025-04-10 RX ADMIN — LEVOTHYROXINE SODIUM 150 MCG: 75 TABLET ORAL at 11:31

## 2025-04-10 RX ADMIN — SODIUM CHLORIDE 75 ML/HR: 4.5 INJECTION, SOLUTION INTRAVENOUS at 17:53

## 2025-04-10 RX ADMIN — SODIUM CHLORIDE 1000 ML: 0.9 INJECTION, SOLUTION INTRAVENOUS at 04:47

## 2025-04-10 RX ADMIN — ONDANSETRON 4 MG: 2 INJECTION INTRAMUSCULAR; INTRAVENOUS at 01:20

## 2025-04-10 RX ADMIN — HALOPERIDOL LACTATE 2 MG: 5 INJECTION, SOLUTION INTRAMUSCULAR at 23:33

## 2025-04-10 RX ADMIN — INSULIN LISPRO 3 UNITS: 100 INJECTION, SOLUTION INTRAVENOUS; SUBCUTANEOUS at 15:54

## 2025-04-10 RX ADMIN — Medication 1.5 G: at 05:37

## 2025-04-10 RX ADMIN — INSULIN GLARGINE 20 UNITS: 100 INJECTION, SOLUTION SUBCUTANEOUS at 21:43

## 2025-04-10 RX ADMIN — DIPHENHYDRAMINE HCL 50 MG: 25 TABLET, COATED ORAL at 16:00

## 2025-04-10 RX ADMIN — PREDNISONE 50 MG: 20 TABLET ORAL at 21:42

## 2025-04-10 RX ADMIN — INSULIN LISPRO 4 UNITS: 100 INJECTION, SOLUTION INTRAVENOUS; SUBCUTANEOUS at 21:43

## 2025-04-10 SDOH — SOCIAL STABILITY: SOCIAL INSECURITY: HAVE YOU HAD THOUGHTS OF HARMING ANYONE ELSE?: NO

## 2025-04-10 SDOH — SOCIAL STABILITY: SOCIAL INSECURITY: WERE YOU ABLE TO COMPLETE ALL THE BEHAVIORAL HEALTH SCREENINGS?: NO

## 2025-04-10 SDOH — SOCIAL STABILITY: SOCIAL INSECURITY: DOES ANYONE TRY TO KEEP YOU FROM HAVING/CONTACTING OTHER FRIENDS OR DOING THINGS OUTSIDE YOUR HOME?: NO

## 2025-04-10 SDOH — SOCIAL STABILITY: SOCIAL INSECURITY: HAS ANYONE EVER THREATENED TO HURT YOUR FAMILY OR YOUR PETS?: NO

## 2025-04-10 SDOH — SOCIAL STABILITY: SOCIAL INSECURITY: DO YOU FEEL ANYONE HAS EXPLOITED OR TAKEN ADVANTAGE OF YOU FINANCIALLY OR OF YOUR PERSONAL PROPERTY?: NO

## 2025-04-10 SDOH — SOCIAL STABILITY: SOCIAL INSECURITY: DO YOU FEEL UNSAFE GOING BACK TO THE PLACE WHERE YOU ARE LIVING?: NO

## 2025-04-10 SDOH — SOCIAL STABILITY: SOCIAL INSECURITY: ARE THERE ANY APPARENT SIGNS OF INJURIES/BEHAVIORS THAT COULD BE RELATED TO ABUSE/NEGLECT?: NO

## 2025-04-10 SDOH — SOCIAL STABILITY: SOCIAL INSECURITY: ABUSE: ADULT

## 2025-04-10 SDOH — SOCIAL STABILITY: SOCIAL INSECURITY: ARE YOU OR HAVE YOU BEEN THREATENED OR ABUSED PHYSICALLY, EMOTIONALLY, OR SEXUALLY BY ANYONE?: NO

## 2025-04-10 ASSESSMENT — COGNITIVE AND FUNCTIONAL STATUS - GENERAL
TOILETING: A LITTLE
MOVING TO AND FROM BED TO CHAIR: A LITTLE
DAILY ACTIVITIY SCORE: 18
DAILY ACTIVITIY SCORE: 24
CLIMB 3 TO 5 STEPS WITH RAILING: A LOT
WALKING IN HOSPITAL ROOM: A LOT
MOBILITY SCORE: 24
EATING MEALS: A LITTLE
DAILY ACTIVITIY SCORE: 24
PATIENT BASELINE BEDBOUND: NO
DRESSING REGULAR UPPER BODY CLOTHING: A LITTLE
STANDING UP FROM CHAIR USING ARMS: A LOT
HELP NEEDED FOR BATHING: A LITTLE
MOBILITY SCORE: 24
MOBILITY SCORE: 16
PERSONAL GROOMING: A LITTLE
DRESSING REGULAR LOWER BODY CLOTHING: A LITTLE
TURNING FROM BACK TO SIDE WHILE IN FLAT BAD: A LITTLE

## 2025-04-10 ASSESSMENT — ACTIVITIES OF DAILY LIVING (ADL)
WALKS IN HOME: NEEDS ASSISTANCE
FEEDING YOURSELF: NEEDS ASSISTANCE
ADEQUATE_TO_COMPLETE_ADL: YES
PATIENT'S MEMORY ADEQUATE TO SAFELY COMPLETE DAILY ACTIVITIES?: NO
TOILETING: NEEDS ASSISTANCE
HEARING - RIGHT EAR: FUNCTIONAL
JUDGMENT_ADEQUATE_SAFELY_COMPLETE_DAILY_ACTIVITIES: NO
HEARING - LEFT EAR: FUNCTIONAL
BATHING: NEEDS ASSISTANCE
DRESSING YOURSELF: NEEDS ASSISTANCE
GROOMING: NEEDS ASSISTANCE

## 2025-04-10 ASSESSMENT — ENCOUNTER SYMPTOMS
CHILLS: 0
DYSURIA: 0
DIARRHEA: 0
NAUSEA: 0
SHORTNESS OF BREATH: 0
HEMATURIA: 0
CONSTIPATION: 0
FREQUENCY: 0
ABDOMINAL PAIN: 0
VOMITING: 0
FEVER: 0
FLANK PAIN: 0
PALPITATIONS: 0

## 2025-04-10 ASSESSMENT — PAIN SCALES - GENERAL
PAINLEVEL_OUTOF10: 0 - NO PAIN

## 2025-04-10 ASSESSMENT — COLUMBIA-SUICIDE SEVERITY RATING SCALE - C-SSRS
2. HAVE YOU ACTUALLY HAD ANY THOUGHTS OF KILLING YOURSELF?: NO
1. IN THE PAST MONTH, HAVE YOU WISHED YOU WERE DEAD OR WISHED YOU COULD GO TO SLEEP AND NOT WAKE UP?: NO
6. HAVE YOU EVER DONE ANYTHING, STARTED TO DO ANYTHING, OR PREPARED TO DO ANYTHING TO END YOUR LIFE?: NO

## 2025-04-10 ASSESSMENT — LIFESTYLE VARIABLES
AUDIT-C TOTAL SCORE: -1
HOW OFTEN DO YOU HAVE 6 OR MORE DRINKS ON ONE OCCASION: PATIENT UNABLE TO ANSWER
SKIP TO QUESTIONS 9-10: 0
HOW OFTEN DO YOU HAVE A DRINK CONTAINING ALCOHOL: PATIENT UNABLE TO ANSWER
HOW MANY STANDARD DRINKS CONTAINING ALCOHOL DO YOU HAVE ON A TYPICAL DAY: PATIENT UNABLE TO ANSWER
AUDIT-C TOTAL SCORE: -1

## 2025-04-10 ASSESSMENT — PAIN - FUNCTIONAL ASSESSMENT
PAIN_FUNCTIONAL_ASSESSMENT: 0-10
PAIN_FUNCTIONAL_ASSESSMENT: 0-10

## 2025-04-10 ASSESSMENT — PATIENT HEALTH QUESTIONNAIRE - PHQ9
SUM OF ALL RESPONSES TO PHQ9 QUESTIONS 1 & 2: 0
2. FEELING DOWN, DEPRESSED OR HOPELESS: NOT AT ALL
1. LITTLE INTEREST OR PLEASURE IN DOING THINGS: NOT AT ALL

## 2025-04-10 ASSESSMENT — VISUAL ACUITY: OU: 1

## 2025-04-10 NOTE — Clinical Note
Angioplasty of the middle left anterior descending lesion. Inflation 1: Pressure = 12 henry; Duration = 10 sec. Inflation 2: Pressure = 12 henry; Duration = 6 sec.

## 2025-04-10 NOTE — NURSING NOTE
Patient showing ST elevation on telemetry monitor. EKG done at bedside, showing normal sinus rhythm. Vital signs stable, hospitalist, rapid response nurse and Dr. Chapman made aware of new consult, labs and patient condition. . New orders for YOVANA placed by Dr. Chapman. Patient denies chest pain, shortness of breath at this time.

## 2025-04-10 NOTE — H&P
"History Of Present Illness  Jw Salinas is a 70 y.o. male with a past medical history of vascular dementia, HTN, HLD, chronic pain on gabapentin, and poorly-controlled T1DM (per endocrine note 5/21/2024, type 1, previous records also show type 2) who presented to the ED for syncope and collapse. Apparently, EMS had responded to  his house around 1800 hours for him syncopizing on the toilet, however he refused transport at that time. Around 0000, he had another witnessed syncopal event, falling back and striking his head. There was brief LOC. The patient endorses generalized weakness. He does not recall the event. He denies any fever, chills, chest pain, shortness of breath, or palpitation. On exam, he states that he \"is fine\", however he states he is at home and the year is 2005.      Past Medical History  History reviewed. No pertinent past medical history.    Surgical History  History reviewed. No pertinent surgical history.     Social History  He has no history on file for tobacco use, alcohol use, and drug use.    Family History  No family history on file.     Allergies  Patient has no known allergies.    Review of Systems   Constitutional:  Negative for chills and fever.   Respiratory:  Negative for shortness of breath.    Cardiovascular:  Negative for chest pain and palpitations.   Gastrointestinal:  Negative for abdominal pain, constipation, diarrhea, nausea and vomiting.   Genitourinary:  Negative for dysuria, flank pain, frequency, hematuria and urgency.   All other systems reviewed and are negative.       Physical Exam  Vitals reviewed.   Constitutional:       Appearance: He is cachectic.   HENT:      Head: Normocephalic and atraumatic.      Mouth/Throat:      Mouth: Mucous membranes are dry.   Cardiovascular:      Rate and Rhythm: Normal rate and regular rhythm.      Heart sounds: Normal heart sounds.   Pulmonary:      Effort: Pulmonary effort is normal.      Breath sounds: Normal air entry.   Abdominal: " "     General: Bowel sounds are normal.      Palpations: Abdomen is soft.      Tenderness: There is no abdominal tenderness.   Musculoskeletal:         General: No deformity.   Skin:     General: Skin is warm and dry.   Neurological:      General: No focal deficit present.      Mental Status: He is alert. Mental status is at baseline. He is disoriented.   Psychiatric:         Mood and Affect: Mood normal.         Behavior: Behavior normal.          Last Recorded Vitals  Blood pressure (!) 96/49, pulse 86, temperature 36.9 °C (98.4 °F), resp. rate 17, height 1.778 m (5' 10\"), weight 59 kg (130 lb), SpO2 98%.    Relevant Results      Lab Results   Component Value Date    WBC 20.5 (H) 04/10/2025    HGB 12.5 (L) 04/10/2025    HCT 34.0 (L) 04/10/2025    MCV 79 (L) 04/10/2025     04/10/2025     Lab Results   Component Value Date    GLUCOSE 221 (H) 04/10/2025    CALCIUM 7.4 (L) 04/10/2025     (L) 04/10/2025    K 2.8 (LL) 04/10/2025    CO2 21 04/10/2025    CL 97 (L) 04/10/2025    BUN 54 (H) 04/10/2025    CREATININE 1.99 (H) 04/10/2025     CT chest abdomen pelvis wo IV contrast  Narrative: Interpreted By:  Juan Atkins,   STUDY:  CT CHEST ABDOMEN PELVIS WO CONTRAST;  4/10/2025 2:29 am      INDICATION:  Signs/Symptoms:Syncope, collapse, closed head injury.      COMPARISON:  None      ACCESSION NUMBER(S):  [id number]      ORDERING CLINICIAN:  NIR WAGONER      TECHNIQUE:  Axial non-contrast CT images of the chest, abdomen and pelvis with  coronal and sagittal reconstructed images.      FINDINGS:  Lack of intravenous contrast limits evaluation of vessel, solid  organs and bowel.      BONES: Diffuse osseous demineralization. Spine findings dictated  separately. Minimal cortical buckling of the anterolateral right  6-8th ribs, indeterminate for possible nondisplaced fractures.      CHEST:  LOWER NECK: Thyroid gland is not well-visualized, either absent or  atrophic. VESSELS: Atherosclerotic calcification of the " aorta. No  aneurysm or dissection. HEART: Extensive coronary artery  calcifications. Aortic annulus and valve calcifications. Left  ventricular papillary muscle calcifications (201/68). LYMPH NODES:  Unremarkable. MEDIASTINUM/ESOPHAGUS: Mild fluid distention of the  esophagus. LUNGS AND LARGE AIRWAYS: Somewhat limited evaluation due  to motion artifact. Bibasilar atelectasis. Left lower lobe 3 mm  nodule (204/132) PLEURA: No pleural effusion or pneumothorax.  CHEST WALL: Unremarkable.      ABDOMEN:  ABDOMINAL WALL: Tiny fat containing umbilical hernia.  LIVER: Punctate calcification in the right lobe, likely a calcified  granuloma. Focal fat near the falciform ligament. BILE DUCTS:  Unremarkable. GALLBLADDER: Unremarkable.  PANCREAS:Unremarkable.  SPLEEN: Unremarkable.  ADRENALS: Unremarkable.  KIDNEYS and URETERS: Bilateral extrarenal pelvis.  VESSELS: Moderate-to-severe aortoiliac atherosclerosis.  LYMPH NODES: Unremarkable.  RETROPERITONEUM/PERITONEUM: Unremarkable.  BOWEL: Stomach is distended with fluid contents. Small bowel loops  are nonobstructive.      PELVIS:  REPRODUCTIVE ORGANS: No pelvic masses.  BLADDER: Right lateral bladder wall diverticulum.      Impression: Chest:  *Minimal cortical buckling of the right anterolateral 6th-8th ribs,  indeterminate for nondisplaced fractures. Correlate with focal pain  on exam. *Left lower lobe 3 mm nodule. No further follow-up is  required, however, if the patient has high risk factors for primary  lung malignancy, follow-up noncontrast CT scan chest in 12 months may  be obtained. (Rg Key et al., Guidelines for management of  incidental pulmonary nodules detected on CT images: From the  Fleischner Society 2017, Radiology. 2017 Jul;284 (1):228-243.)      Abdomen/pelvis:  *No acute findings in the abdomen or pelvis.  *The stomach and esophagus are distended with fluid contents which  places patient at increased risk for aspiration. *Other chronic  findings as  discussed above.      MACRO:  Critical Finding:  See findings. Notification was initiated on  4/10/2025 at 3:34 am by  Juan Atkins.  (**-YCF-**) Instructions:      Signed by: Juan Atkins 4/10/2025 3:38 AM  Dictation workstation:   [id number]  CT lumbar spine retrospective reconstruction protocol, CT thoracic spine retrospective reconstruction protocol  Narrative: Interpreted By:  Juan Atkins,   STUDY:  CT THORACIC SPINE RETROSPECTIVE RECONSTRUCTION PROTOCOL; CT LUMBAR  SPINE RETROSPECTIVE RECONSTRUCTION PROTOCOL;  4/10/2025 2:29 am      INDICATION:  Signs/Symptoms:Syncope, collapse, closed head injury.      COMPARISON:  None.      ACCESSION NUMBER(S):  [id number]; [id number]      ORDERING CLINICIAN:  NIR WAGONER      TECHNIQUE:  Axial noncontrast images of the thoracic and lumbar spine with  coronal and sagittal reconstructed images.      FINDINGS:  THORACIC SPINE:  ALIGNMENT: Normal.  VERTEBRAE: Diffuse osseous demineralization. No acute fracture.  SPINAL CANAL: Degenerative changes result in varying degrees of  central and neural foraminal narrowing no critical spinal canal  stenosis. PREVERTEBRAL SOFT TISSUES: No prevertebral soft tissue  swelling.      LUMBAR SPINE:  ALIGNMENT: Normal.  VERTEBRAE: Diffuse osseous demineralization. No acute fracture.  SPINAL CANAL: Degenerative changes result in varying degrees of  central and neural foraminal narrowing. No critical spinal canal  stenosis. PREVERTEBRAL SOFT TISSUES: No prevertebral soft tissue  swelling.      OTHER FINDINGS: None.      Impression: No acute fracture or traumatic subluxation of the thoracic and lumbar  spine.      MACRO:  None      Signed by: Juan Atkins 4/10/2025 3:24 AM  Dictation workstation:   [id number]  CT cervical spine wo IV contrast  Narrative: Interpreted By:  Juan Atkins,   STUDY:  CT CERVICAL SPINE WO IV CONTRAST;  4/10/2025 2:25 am      INDICATION:  Signs/Symptoms:Syncope, collapse, closed head injury.       COMPARISON:  C-spine radiographs 04/27/2010      ACCESSION NUMBER(S):  [id number]      ORDERING CLINICIAN:  NIR WAGONER      TECHNIQUE:  Axial noncontrast images of the cervical spine with coronal and  sagittal reconstructed images.      FINDINGS:  ALIGNMENT: No traumatic subluxation. Straightening of the cervical  spine is likely positional. VERTEBRAE: No acute fracture.  SPINAL CANAL: Degenerative changes including facet and uncovertebral  arthropathy and posterior disc osteophyte complexes result in varying  degrees of central and neuroforaminal narrowing. PREVERTEBRAL SOFT  TISSUES: No prevertebral soft tissue swelling. LUNG APICES: Imaged  portion of the lung apices are within normal limits. OTHER: Vascular  calcifications.      Impression: 1. No acute fractures or traumatic subluxation.  2. Additional chronic findings as discussed above.      MACRO:  None      Signed by: Juan Atkins 4/10/2025 3:18 AM  Dictation workstation:   [id number]  CT head wo IV contrast  Narrative: Interpreted By:  Juan Atkins,   STUDY:  CT HEAD WO IV CONTRAST;  4/10/2025 2:25 am      INDICATION:  Signs/Symptoms:Syncope, collapse, closed head injury.      COMPARISON:  None.      ACCESSION NUMBER(S):  [id number]      ORDERING CLINICIAN:  NIR WAGONER      TECHNIQUE:  Axial non-contrast CT images of the head. Coronal and sagittal images  were reconstructed.      FINDINGS:  BRAIN PARENCHYMA: Sub-cortical and periventricular hypoattenuating  foci, likely sequelae of chronic ischemic microangiopathy. Gray-white  matter differentiation is preserved. VENTRICLES:Prominence of the  ventricles. EXTRA-AXIAL SPACES: No collections.  SOFT TISSUES: Unremarkable.  SINUSES: Paranasal sinuses are clear.  MASTOIDS: Mastoid air cells are clear.  CALVARIUM: No depressed skull fractures.  VESSELS: Dense calcification of the vertebral arteries and bilateral  carotid siphons. OTHER FINDINGS: None.      Impression: 1. No acute intracranial  abnormality.  2. Prominence of the ventricles is likely related to generalized  cerebral volume loss although normal pressure hydrocephalus is  difficult to exclude. Correlate with symptoms.  3. Other chronic findings as discussed above.      MACRO:  None      Signed by: Juan Atkins 4/10/2025 3:13 AM  Dictation workstation:   [id number]  XR pelvis 1-2 views  Narrative: Interpreted By:  Juan Atkins,   STUDY:  XR PELVIS 1-2 VIEWS; ;  4/10/2025 1:06 am      INDICATION:  Signs/Symptoms:Syncope, collapse, closed head injury.      COMPARISON:  None.      ACCESSION NUMBER(S):  [id number]      ORDERING CLINICIAN:  NIR WAGONER      FINDINGS:  SI joints are symmetric. Pelvic ring is intact.      Normal hip alignment bilaterally. No acute fractures.      No acute soft tissue abnormalities. Extensive vascular calcifications.      Impression: Pelvic ring is intact. Normal hip alignment bilaterally. No acute  fractures. If there is high suspicion for fracture on exam, recommend  further evaluation with CT.      MACRO:  None      Signed by: Juan Atkins 4/10/2025 1:49 AM  Dictation workstation:   [id number]  XR chest 1 view  Narrative: Interpreted By:  Juan Atkins,   STUDY:  XR CHEST 1 VIEW;  4/10/2025 1:06 am      INDICATION:  Signs/Symptoms:Syncope, collapse, closed head injury.      COMPARISON:  None.      ACCESSION NUMBER(S):  [id number]      ORDERING CLINICIAN:  NIR WAGONER      FINDINGS:  Lungs are clear.      No pleural effusion or pneumothorax.      Cardiomediastinal contour is normal in size and configuration.      Upper abdomen is unremarkable.      No acute osseous abnormality.      Impression: 1. No acute cardiopulmonary process.      MACRO:  None      Signed by: Juan Atkins 4/10/2025 1:47 AM  Dictation workstation:   [id number]    ED Medication Administration from 04/10/2025 0033 to 04/10/2025 0521         Date/Time Order Dose Route Action Action by     04/10/2025 0105 EDT lactated  Ringer's bolus 1,000 mL 1,000 mL intravenous New Bag Ezekiel, ERLIN     04/10/2025 0120 EDT ondansetron (Zofran) injection 4 mg 4 mg intravenous Given Madhavi REGINA     04/10/2025 0137 EDT morphine injection 4 mg 4 mg intravenous Not Given Madhavi REGINA     04/10/2025 0205 EDT lactated Ringer's bolus 1,000 mL 0 mL intravenous Stopped Ezekiel,      04/10/2025 0328 EDT heparin 25,000 Units in dextrose 5% 250 mL (100 Units/mL) infusion (premix) 700 Units/hr intravenous New Bag Maya,      04/10/2025 0329 EDT heparin bolus from bag 3,500 Units 3,500 Units intravenous Bolus from Bag Velmars,      04/10/2025 0420 EDT sodium chloride 0.9 % bolus 500 mL 500 mL intravenous New Bag Ezekiel,      04/10/2025 0429 EDT potassium chloride CR (Klor-Con M20) ER tablet 40 mEq 40 mEq oral Not Given Kathrinpatience ERLIN     04/10/2025 0446 EDT sodium chloride 0.9 % bolus 500 mL 500 mL intravenous Not Given Ezekiel,      04/10/2025 0447 EDT sodium chloride 0.9 % bolus 1,000 mL 1,000 mL intravenous New Bag Ezekiel,      04/10/2025 0450 EDT magnesium sulfate 2 g in sterile water for injection 50 mL 2 g intravenous New Bag Maya,      04/10/2025 0450 EDT potassium chloride 20 mEq in sterile water for injection 100 mL 20 mEq intravenous New Bag Maya,      04/10/2025 0457 EDT piperacillin-tazobactam (Zosyn) 4.5 g in sodium chloride 0.9%  mL 4.5 g intravenous New Bag Kathrinpatience                Assessment/Plan   Assessment & Plan  NSTEMI (non-ST elevated myocardial infarction) (Multi)      #NSTEMI  #Syncope and collapse  -EKG -ve for STEMI  -Troponin 10,515->13,356  -Heparin gtt started in ED  -Cardiology contacted in ED, consult ordered  -Telemetry  -Keep NPO    #Leukocytosis  #Lactic acidosis (improving)  -Suspect 2/2 NSTEMI, doubt sepsis  -Vanc, Zosyn given in ED, will not continue    #LILIA  -Likely cardiorenal  -Avoid nephrotoxins, renal dosing    #Hypokalemia  #Hypomagnesemia  -Repleted in ED  -Follow, replete  "PRN    #T1DM  -Hyperglycemic on arrival, not in DKA  -Low SSI Q4H while NPO  -Resume home lantus 20 units daily    #Vascular dementia  -Patient AO x1 on my exam  -Review of records shows he has been strongly recommended \"institutionalized\" (5/23/2024)  -Patient does not have capacity at this time             Jeffery Germain, APRN-CNP    "

## 2025-04-10 NOTE — Clinical Note
Angioplasty of the middle left anterior descending lesion. Inflation 1: Pressure = 14 henry; Duration = 12 sec.

## 2025-04-10 NOTE — CONSULTS
Consults  History Of Present Illness:    Jw Salinas is a 70 y.o. male presenting with syncope.  Patient with past medical history of dementia, hypertension, hyperlipidemia, chronic pain, diabetes who presented with syncope and collapse.  Noted to have significant elevated cardiac enzyme at 13,000.  Creatinine is 1.99     Last Recorded Vitals:  Vitals:    04/10/25 0730 04/10/25 0750 04/10/25 1045 04/10/25 1518   BP: 98/55 95/53 100/56 101/55   BP Location: Right arm  Right arm Right arm   Patient Position: Lying  Lying Lying   Pulse: 82 83 85 87   Resp: 18  18 18   Temp: 36.9 °C (98.4 °F)  37.5 °C (99.5 °F) 37 °C (98.6 °F)   TempSrc: Temporal  Temporal Temporal   SpO2: 96% 98% 98% 95%   Weight:       Height:           Last Labs:  CBC - 4/10/2025:  1:11 AM  20.5 12.5 283    34.0      CMP - 4/10/2025:  1:11 AM  7.4 5.5 39 --- 0.5   2.7 3.4 13 44      PTT - No results in last year.  1.0   11.0 _     Troponin I, High Sensitivity   Date/Time Value Ref Range Status   04/10/2025 10:18 AM 11,322 (HH) 0 - 20 ng/L Final     Comment:     Previous result verified on 4/10/2025 0151 on specimen/case [id number] called with component TRPHS for procedure Troponin I, High Sensitivity, Initial with value 10,515 ng/L.   04/10/2025 03:12 AM 13,356 (HH) 0 - 20 ng/L Final     Comment:     Previous result verified on 4/10/2025 0151 on specimen/case [id number] called with component TRPHS for procedure Troponin I, High Sensitivity, Initial with value 10,515 ng/L.   04/10/2025 01:11 AM 10,515 (HH) 0 - 20 ng/L Final     Hemoglobin A1C   Date/Time Value Ref Range Status   05/18/2024 03:08 AM 11.5 (H) 4.0 - 6.0 % Final   04/24/2024 08:29 AM 11.9 (H) 4.3 - 5.6 % Final     Comment:     American Diabetes Association guidelines indicate that patients with HgbA1c in the range 5.7-6.4% are at increased risk for development of diabetes, and intervention by lifestyle modification may be beneficial. HgbA1c greater or equal to 6.5% is  "considered diagnostic of diabetes.   03/16/2023 10:46 AM 9.6 (H) 4.3 - 5.6 % Final     Comment:     American Diabetes Association guidelines indicate that patients with HgbA1c in the range 5.7-6.4% are at increased risk for development of diabetes, and intervention by lifestyle modification may be beneficial. HgbA1c greater or equal to 6.5% is considered diagnostic of diabetes.      Last I/O:  I/O last 3 completed shifts:  In: 1099 (17.1 mL/kg) [IV Piggyback:1099]  Out: - (0 mL/kg)   Weight: 64.2 kg     Past Cardiology Tests (Last 3 Years):  EKG:  ECG 12 lead 04/10/2025 (Preliminary)      ECG 12 lead 04/10/2025      ECG 12 lead 04/10/2025    Echo:  No results found for this or any previous visit from the past 1095 days.    Ejection Fractions:  No results found for: \"EF\"  Cath:  No results found for this or any previous visit from the past 1095 days.    Stress Test:  No results found for this or any previous visit from the past 1095 days.    Cardiac Imaging:  No results found for this or any previous visit from the past 1095 days.      Past Medical History:  He has no past medical history on file.    Past Surgical History:  He has no past surgical history on file.      Social History:  He has no history on file for tobacco use, alcohol use, and drug use.    Family History:  No family history on file.     Allergies:  Patient has no known allergies.    Inpatient Medications:  Scheduled medications   Medication Dose Route Frequency    aspirin  81 mg oral Daily    atorvastatin  40 mg oral Nightly    gabapentin  300 mg oral TID    insulin glargine  20 Units subcutaneous Nightly    insulin lispro  0-5 Units subcutaneous q4h    levothyroxine  150 mcg oral Daily    morphine  4 mg intravenous Once    potassium chloride CR  40 mEq oral Once    predniSONE  50 mg oral q6h     PRN medications   Medication    acetaminophen    Or    acetaminophen    Or    acetaminophen    dextrose    dextrose    glucagon    glucagon    heparin    " melatonin    polyethylene glycol     Continuous Medications   Medication Dose Last Rate    heparin  0-4,000 Units/hr 700 Units/hr (04/10/25 6674)     Outpatient Medications:  Current Outpatient Medications   Medication Instructions    atorvastatin (LIPITOR) 20 mg, Daily    Basaglar KwikPen U-100 Insulin 20 Units, Nightly    insulin regular (HUMULIN R,NOVOLIN R) 10 Units, 3 times daily before meals    levothyroxine (SYNTHROID, LEVOXYL) 150 mcg, Daily    lisinopril 10 mg, Daily     Review of Systems   Unable to perform ROS: Dementia      Physical Exam:  Physical Exam  Constitutional:       Appearance: Normal appearance.   HENT:      Head: Normocephalic and atraumatic.      Mouth/Throat:      Mouth: Mucous membranes are moist.      Pharynx: Oropharynx is clear.   Eyes:      Extraocular Movements: Extraocular movements intact.      Conjunctiva/sclera: Conjunctivae normal.      Pupils: Pupils are equal, round, and reactive to light.   Neck:      Vascular: No carotid bruit.   Cardiovascular:      Rate and Rhythm: Normal rate and regular rhythm.   Pulmonary:      Effort: Pulmonary effort is normal.      Breath sounds: Normal breath sounds. No wheezing or rales.   Abdominal:      General: Bowel sounds are normal. There is no distension.      Tenderness: There is no abdominal tenderness. There is no guarding.   Musculoskeletal:      Cervical back: Normal range of motion and neck supple.   Skin:     General: Skin is warm.   Neurological:      General: No focal deficit present.      Mental Status: Mental status is at baseline.   Psychiatric:         Mood and Affect: Mood normal.           Assessment:  Non-ST elevation MI  Syncope rule out cardiac ischemia.  Rule out arrhythmia rule out valvular heart disease  Hypertension  Hyperlipidemia  Diabetes mellitus  Renal failure      Plan:  Check 2D echocardiogram  Cardiac catheterization  Maximize cardiac medications  Monitor on telemetry  Avoid nephrotoxic agents  GI/DVT  prophylaxis      Mike TAPIA Holiday, DO

## 2025-04-10 NOTE — PROGRESS NOTES
Spoke with son. Patient resides with son. He resides in 3rd level walk up apartment. Son advises patient seldom leaves apartment and if needed he would assist. Son provides assistance, groceries, and  gets meds. Son did mention that patient likely does not self care or medicate as instructed MD. Son did not recall whom/which provider patient received care with . Ambulatory wise patient was independent in portions of ADL at home. No assistive devices used.  TCC introduced concept of SNF and advised son list would be provided for SNF choices.   Current patient is not able to participate in discussion due to cognition. Anticipate TCC  will attempt re- interview with patient tomarrow.

## 2025-04-10 NOTE — Clinical Note
Angioplasty of the middle left anterior descending lesion. Inflation 1: Pressure = 15 henry; Duration = 8 sec.

## 2025-04-10 NOTE — NURSING NOTE
Patient arrived from cath lab, s/p PCI with 2 SARAH to LAD. Right radial site closed with a vascband with 11cc of air, radial pulses palpable. Patient is alert and oriented to self and place. Patient has no c/o at this time. Tele monitor applied and VSS. 1/2 NS infusing at 75cc/hr per order. Will continue monitor.

## 2025-04-10 NOTE — Clinical Note
Angioplasty of the proximal left anterior descending lesion. Inflation 1: Pressure = 16 henry; Duration = 6 sec. Inflation 2: Pressure = 16 henry; Duration = 8 sec.

## 2025-04-10 NOTE — Clinical Note
Closure device placed in the right radial artery. Site closed by radial compression system. Deployed By: Rio Carranza RN 11 cc air

## 2025-04-10 NOTE — POST-PROCEDURE NOTE
Procedure(s):  LHC, b/l coronary angio, PCI of mid LAD with DESx2    Pre-operative Diagnosis:  NSTEMI    H&P Status: Completed and reviewed.     Post-operative Diagnosis:      LV normal LVEDP. No LV gram obtained  LM large caliber, mild disease  LAD calcified. Mod caliber, diffuse disease. 80-85% in mid  CX mod caliber, mild to mod diffuse disease  RCA large caliber, dominant. Mild to mod disease.     Findings:  See full report    Complications:  none    IMPLANTS: SARAH    Primary Proceduralist:   Dr.Wes Chapman DO    Plan    DAPT  RFM  Max med rx    Full procedure note to follow

## 2025-04-10 NOTE — ED PROVIDER NOTES
HPI   No chief complaint on file.      70-year-old male brought in from home by EMS with report of syncope and collapse.  According to EMS they had initially responded to his residence at 1800 hrs. after he had a syncopal episode while sitting on the toilet.  The patient at that time had signed off and refused to be transferred to the ER.  At around 0000 hrs. the patient had another witnessed syncopal events.  The patient fell back hitting his head.  There was a brief loss of conscious.  EMS reports on scene they found him sitting on the floor with his back up against the edge of the bed.  Accu-Chek read critical high.  At this time the patient agreed to be transferred to the ER for further evaluation.  Patient complains of feeling weak all over.  He does not recall the event.  He does have a history of hypertension, high cholesterol, chronic pain on gabapentin and diabetes.  The patient and EMS are the primary historians.  He denies any chest pain, heart palpitations or shortness of breath prior to the event.  Did attempt to review the patient's EMR and there is nothing in the computer.  The patient states that he usually goes to the [facility] for his health care.      History provided by:  Patient and EMS personnel          Patient History   History reviewed. No pertinent past medical history.  History reviewed. No pertinent surgical history.  No family history on file.  Social History     Tobacco Use    Smoking status: Not on file    Smokeless tobacco: Not on file   Substance Use Topics    Alcohol use: Not on file    Drug use: Not on file       Physical Exam   ED Triage Vitals [04/10/25 0048]   Temperature Pulse Respirations BP   36.9 °C (98.4 °F) -- 18 100/55      Pulse Ox Temp Source Heart Rate Source Patient Position   98 % Temporal Monitor Lying      BP Location FiO2 (%)     Right arm --       Physical Exam  Vitals and nursing note reviewed.   Constitutional:       General: He is awake. He is not in  acute distress.     Appearance: He is well-developed, well-groomed and normal weight. He is ill-appearing. He is not toxic-appearing or diaphoretic.      Interventions: Cervical collar and backboard in place.   HENT:      Head: Normocephalic and atraumatic. No raccoon eyes, Benz's sign, abrasion, contusion, masses, right periorbital erythema, left periorbital erythema or laceration. Hair is normal.      Jaw: There is normal jaw occlusion. No trismus, tenderness, swelling, pain on movement or malocclusion.      Right Ear: Hearing, tympanic membrane, ear canal and external ear normal.      Left Ear: Hearing, tympanic membrane, ear canal and external ear normal.      Nose: Nose normal.      Right Nostril: No epistaxis or septal hematoma.      Left Nostril: No epistaxis or septal hematoma.      Mouth/Throat:      Lips: Pink.      Mouth: Mucous membranes are dry.      Pharynx: Oropharynx is clear. Uvula midline.   Eyes:      General: Lids are normal. Vision grossly intact. Gaze aligned appropriately.      Extraocular Movements: Extraocular movements intact.      Conjunctiva/sclera: Conjunctivae normal.      Pupils: Pupils are equal, round, and reactive to light.   Neck:      Trachea: Trachea and phonation normal.      Comments: Trachea midline, no crepitation, no midline C-spine tenderness on palpation.  Range of motion was limited due to the patient wearing her cervical collar.  Cardiovascular:      Rate and Rhythm: Normal rate and regular rhythm.      Pulses: Normal pulses.      Heart sounds: Normal heart sounds.   Pulmonary:      Effort: Pulmonary effort is normal.      Breath sounds: Normal breath sounds. No wheezing, rhonchi or rales.   Abdominal:      General: Abdomen is flat. Bowel sounds are normal.      Palpations: Abdomen is soft. There is no mass.      Tenderness: There is no abdominal tenderness. There is no guarding.   Musculoskeletal:         General: No swelling or tenderness. Normal range of motion.       Right shoulder: Normal.      Left shoulder: Normal.      Right upper arm: Normal.      Left upper arm: Normal.      Right elbow: Normal.      Left elbow: Normal.      Right forearm: Normal.      Left forearm: Normal.      Right wrist: Normal.      Left wrist: Normal.      Right hand: Normal.      Left hand: Normal.      Cervical back: Normal and neck supple. No pain with movement, spinous process tenderness or muscular tenderness.      Thoracic back: Normal.      Lumbar back: Normal.      Right hip: Normal.      Left hip: Normal.      Right upper leg: Normal.      Left upper leg: Normal.      Right knee: Normal.      Left knee: Normal.      Right lower leg: Normal. No edema.      Left lower leg: Normal. No edema.      Right ankle: Normal.      Left ankle: Normal.      Right foot: Normal.      Left foot: Normal.   Skin:     General: Skin is warm and dry.      Capillary Refill: Capillary refill takes less than 2 seconds.      Findings: No rash.   Neurological:      General: No focal deficit present.      Mental Status: He is alert and oriented to person, place, and time. Mental status is at baseline.      GCS: GCS eye subscore is 4. GCS verbal subscore is 5. GCS motor subscore is 6.      Sensory: Sensation is intact.      Motor: Motor function is intact.   Psychiatric:         Attention and Perception: Attention and perception normal.         Mood and Affect: Mood and affect normal.         Speech: Speech normal.         Behavior: Behavior is slowed. Behavior is cooperative.         Thought Content: Thought content normal.         Cognition and Memory: He exhibits impaired recent memory.         Judgment: Judgment normal.           ED Course & MDM   Diagnoses as of 04/10/25 0414   NSTEMI (non-ST elevated myocardial infarction) (Multi)   Syncope and collapse   Closed head injury, initial encounter   Acute renal failure, unspecified acute renal failure type   Hypomagnesemia   Hypokalemia   Hypocalcemia   Acute cervical  myofascial strain, initial encounter   Hyperglycemia   Closed fracture of multiple ribs of right side, initial encounter                 No data recorded                                 Medical Decision Making  Temperature 36.9, respirations 18, blood pressure 100/55, pulse ox is 98% on room air  Initial EKG interpreted by me at 0112 hrs. normal sinus rhythm rate 83, , QRS was 84, QT was 368 QTc was 432 no STEMI  The patient was given a liter of LR wide open, Zofran 4 mg IV push, morphine 4 mg IV push  Venous blood gas pH 7.38, pCO2 47, pO2 of 20, sodium 129, potassium 3.3, lactic 5.1 glucose 247    CBC white count 20.5, hemoglobin 12.5, hematocrit 34, platelet count was 283, metabolic glucose 221, sodium 132, potassium 2.8.  I have ordered 40 mill equivalents orally and 20 mEq IV piggyback, chloride is 97, BUN 54, creatinine 1.99 with a GFR of 35, calcium is 7.4.  There are no previous lab values to compare.  PT is 11, INR 1.0, phosphorus is 2.7, lipase is 70, beta hydroxybutyrate is 0.52, lactic is 4.8, hepatic function panel total protein is 5.5 otherwise within normal limits, magnesium is 1.27.  I ordered 2 g magnesium sulfate IV piggyback over 2 hours, venous blood gas pH was 7.38, pCO2 47, pO2 of 20, lactic is 5.1, glucose is 247, chloride 91, ionized calcium was 1.07, troponin 10,515.  X-ray of the chest shows no acute cardiopulmonary process, x-ray of the pelvis showed pelvic ring is intact, normal hip alignment bilaterally no acute fractures.  CT scan of the head was unremarkable.  Heparin was started for the NSTEMI.  Repeat EKG interpreted by me at 0 318 normal sinus rhythm rate 86  QRS was 90  QTc was 442 no STEMI  CT cervical spine shows no acute fracture or traumatic subluxation.  CT chest abdomen pelvis without contrast as well as CT of the thoracic and lumbar spine report reads no acute findings in the abdomen and pelvis, the stomach and esophagus are distended with fluid contents  which places patient at increased risk for aspiration, there is minimal cortical buckling of the right anterior lateral 6th through 8th ribs indeterminate for nondisplaced fractures, there is a left lower lobe nodule, thoracic spine shows no acute fracture or traumatic subluxation of the thoracic or lumbar spine.  0400 hrs. BP is 80/44, heart rate 86 respirations 17 pulse ox is 97% on room air temp 36.9 °C  I have ordered a 500 cc bolus of normal saline paged out to cardiology  0415 hrs. spoke with cardiology Dr. Roth he recommends admit to medicine on heparin and he will see the patient in the morning.  Due to the patient's vital signs hypotension with elevated white count sepsis is a consideration.  I have ordered blood cultures x 2, the patient was started on Zosyn 4.5 g IV piggyback followed by vancomycin 1.5 g IV piggyback.  I contacted DR. Fam intensivist, he states patient is is safe for the floor given he is responding to IV fluids.  Patient accepted to the hospitalist service.        Procedure  Critical Care    Performed by: Elder Platt PA-C  Authorized by: Trenton Pyle MD    Critical care provider statement:     Critical care time (minutes):  41    Critical care time was exclusive of:  Separately billable procedures and treating other patients    Critical care was necessary to treat or prevent imminent or life-threatening deterioration of the following conditions:  Cardiac failure (NSTEMI)    Critical care was time spent personally by me on the following activities:  Development of treatment plan with patient or surrogate, evaluation of patient's response to treatment, examination of patient, obtaining history from patient or surrogate, review of old charts, re-evaluation of patient's condition, pulse oximetry, ordering and review of radiographic studies and ordering and review of laboratory studies    Care discussed with: admitting provider         Elder Platt,  ERIK  04/10/25 0523

## 2025-04-10 NOTE — Clinical Note
Angioplasty of the proximal left anterior descending lesion. Inflation 1: Pressure = 14 henry; Duration = 10 sec.

## 2025-04-10 NOTE — NURSING NOTE
Call placed to son: Chase. He is at home, just updated that his father has had his cardiac catheterization done and will be in recovery for a few hours prior to going back upstairs to 8S. He states he may come in later this evening to see his father. Patient is sleeping, easily arousable. Yellow folder made with his patient education: procedure, stent card, medication education on Plavix and [facility] handout reinforcing never to stop plavix without cardiology approval. Handout for cardiac rehab also included. Education will need to be reinforced with son and patient prior to discharge.

## 2025-04-10 NOTE — Clinical Note
Patient ID band present and verified. Patient contact is in waiting room. Contact(s) present: son. Contact name: Chase.

## 2025-04-10 NOTE — CARE PLAN
70-year-old male admitted with acute NSTEMI, reportedly patient was confused last night although Confusion seems to be better, does have a history of vascular dementia, will check a urine drug screen as well  Denies any chest pain or shortness of breath, troponin is trending down  On aspirin, statin blood pressure is on the lower side so will avoid beta-blocker continue heparin infusion  Possible echo and cardiac cath later today, cardiology on board

## 2025-04-10 NOTE — SIGNIFICANT EVENT

## 2025-04-10 NOTE — NURSING NOTE
Patient incontinent of large amount of urine, pericare given and complete bed change done. IV fluid originally was infusing in Lt AC, but due to position is now difficult to flush, IV fluids moved to LT FA 20G IV, infusing well. Attempted to give patient sip of water, he coughs afterwards. Did not give any more fluids until swallow eval performed. Rt radial site remains stable, will begin to release air every 15 minutes per protocol as tolerated.

## 2025-04-10 NOTE — CARE PLAN
The patient's goals for the shift include      The clinical goals for the shift include patient will remain hemodynamically stable        Problem: Pain - Adult  Goal: Verbalizes/displays adequate comfort level or baseline comfort level  Outcome: Not Progressing     Problem: Safety - Adult  Goal: Free from fall injury  Outcome: Not Progressing     Problem: Discharge Planning  Goal: Discharge to home or other facility with appropriate resources  Outcome: Not Progressing     Problem: Chronic Conditions and Co-morbidities  Goal: Patient's chronic conditions and co-morbidity symptoms are monitored and maintained or improved  Outcome: Not Progressing     Problem: Nutrition  Goal: Nutrient intake appropriate for maintaining nutritional needs  Outcome: Not Progressing     Problem: Skin  Goal: Decreased wound size/increased tissue granulation at next dressing change  Outcome: Not Progressing  Goal: Participates in plan/prevention/treatment measures  Outcome: Not Progressing  Goal: Prevent/manage excess moisture  Outcome: Not Progressing  Goal: Prevent/minimize sheer/friction injuries  Outcome: Not Progressing  Goal: Promote/optimize nutrition  Outcome: Not Progressing  Goal: Promote skin healing  Outcome: Not Progressing

## 2025-04-10 NOTE — DISCHARGE INSTRUCTIONS
CARDIAC CATHETERIZATION DISCHARGE INSTRUCTIONS     FOR SUDDEN AND SEVERE CHEST PAIN, SHORTNESS OF BREATH, EXCESSIVE BLEEDING, SIGNS OF STROKE, OR CHANGES IN MENTAL STATUS YOU SHOULD CALL 911 IMMEDIATELY.     If your provider has prescribed aspirin and/or clopidogrel (Plavix)---DO NOT STOP THESE MEDICATIONS for any reason without talking to your cardiologist first. If any of these were prescribed, you must take them every day without missing a single dose. If you are getting low on these medications, contact your provider immediately for a refill.     FOR NEXT 24 HOURS  - Upon discharge, you should return home and rest for the remainder of the day and evening. You do not have to stay on bed rest but should not be very active.  It is recommended a responsible adult be with you for the first 24 hours after the procedure.    - No driving for 24 hours after procedure. Please arrange for someone to drive you home from the hospital today.     - Do not drive, operate machinery, or use power tools for 24 hours after your procedure.     - Do not make any legal decisions for 24 hours after your procedure.     - Do not drink alcoholic beverages for 24 hours after your procedure.    WOUND CARE     *FOR RADIAL (WRIST) ACCESS*  ·      No lifting more than 10 pounds or excessive use of the wrist for 7 days - for example, treat your wrist as if it is sprained.  ·      Do not engage in vigorous activities (tennis, golf, bowling, weights) for at least 48 hours after the procedure.  ·      Do not submerge the wrist for 7 days after the procedure.  ·      You should expect mild tingling in your hand and tenderness at the puncture site for up to 3 days.    - The transparent dressing should be removed from the site 24 hours after the procedure.  Wash the site gently with soap and water. Rinse well and pat dry. Keep the area clean and dry. You may apply a Band-Aid to the site. Avoid lotions, ointments, or powders until fully  healed.     - You may shower the day after your procedure.      - It is normal to notice a small bruise around the puncture site and/or a small grape sized or smaller lump. Any large bruising or large lump warrants a call to the office.     - If bleeding should occur, apply pressure to the affected area for 10 minutes.  If the bleeding stops notify your physician.  If there is a large amount of bleeding or spurting of blood CALL 911 immediately.  DO NOT drive yourself to the hospital.    - You may experience some tenderness, bruising or minimal inflammation.  If you have any concerns, you may contact the Cath Lab or if any of these symptoms become excessive, contact your cardiologist or go to the emergency room.     OTHER INSTRUCTIONS  - You may take acetaminophen (Tylenol) as directed for discomfort.  If pain is not relieved with acetaminophen (Tylenol), contact your doctor.    - If you notice or experience any of the following, you should notify your doctor or seek medical attention  Chest pain or discomfort  Change in mental status or weakness in extremities.  Dizziness, light headedness, or feeling faint.  Change in the site where the procedure was performed, such as bleeding or an increased area of bruising or swelling.  Tingling, numbness, pain, or coolness in the leg/arm beyond the site where the procedure was performed.  Signs of infection (i.e. shaking chills, temperature > 100 degrees Fahrenheit, warmth, redness) in the leg/arm area where the procedure was performed.  Changes in urination   Bloody or black stools  Vomiting blood  Severe nose bleeds  Any excessive bleeding    - If you DO NOT have an appointment with your cardiologist within 2-4 weeks following your procedure, please contact their office.       Patient needs to be on aspirin Plavix for at least 6 months to 1 year, continue taking all your medications as prescribed   repeat CT chest in 6 to 12 months for noncalcified pulmonary nodule

## 2025-04-10 NOTE — NURSING NOTE
Post-procedure EKG completed. Patient resting quietly, dozing off at intervals. Tele monitor: NSR.

## 2025-04-11 ENCOUNTER — APPOINTMENT (OUTPATIENT)
Dept: RADIOLOGY | Facility: HOSPITAL | Age: 70
DRG: 280 | End: 2025-04-11
Payer: MEDICARE

## 2025-04-11 LAB
ANION GAP SERPL CALC-SCNC: 14 MMOL/L (ref 10–20)
ATRIAL RATE: 82 BPM
BUN SERPL-MCNC: 31 MG/DL (ref 6–23)
CALCIUM SERPL-MCNC: 8.8 MG/DL (ref 8.6–10.3)
CHLORIDE SERPL-SCNC: 100 MMOL/L (ref 98–107)
CO2 SERPL-SCNC: 24 MMOL/L (ref 21–32)
CREAT SERPL-MCNC: 1.12 MG/DL (ref 0.5–1.3)
EGFRCR SERPLBLD CKD-EPI 2021: 71 ML/MIN/1.73M*2
ERYTHROCYTE [DISTWIDTH] IN BLOOD BY AUTOMATED COUNT: 12.8 % (ref 11.5–14.5)
GLUCOSE BLD MANUAL STRIP-MCNC: 163 MG/DL (ref 74–99)
GLUCOSE BLD MANUAL STRIP-MCNC: 188 MG/DL (ref 74–99)
GLUCOSE BLD MANUAL STRIP-MCNC: 224 MG/DL (ref 74–99)
GLUCOSE BLD MANUAL STRIP-MCNC: 227 MG/DL (ref 74–99)
GLUCOSE BLD MANUAL STRIP-MCNC: 228 MG/DL (ref 74–99)
GLUCOSE BLD MANUAL STRIP-MCNC: 272 MG/DL (ref 74–99)
GLUCOSE BLD MANUAL STRIP-MCNC: 277 MG/DL (ref 74–99)
GLUCOSE BLD MANUAL STRIP-MCNC: 278 MG/DL (ref 74–99)
GLUCOSE SERPL-MCNC: 244 MG/DL (ref 74–99)
HCT VFR BLD AUTO: 38.3 % (ref 41–52)
HGB BLD-MCNC: 13.7 G/DL (ref 13.5–17.5)
HOLD SPECIMEN: NORMAL
LACTATE SERPL-SCNC: 1.2 MMOL/L (ref 0.4–2)
MCH RBC QN AUTO: 28.6 PG (ref 26–34)
MCHC RBC AUTO-ENTMCNC: 35.8 G/DL (ref 32–36)
MCV RBC AUTO: 80 FL (ref 80–100)
NRBC BLD-RTO: 0 /100 WBCS (ref 0–0)
P AXIS: 63 DEGREES
P OFFSET: 191 MS
P ONSET: 129 MS
PLATELET # BLD AUTO: 226 X10*3/UL (ref 150–450)
POTASSIUM SERPL-SCNC: 3.8 MMOL/L (ref 3.5–5.3)
PR INTERVAL: 174 MS
Q ONSET: 216 MS
QRS COUNT: 14 BEATS
QRS DURATION: 94 MS
QT INTERVAL: 394 MS
QTC CALCULATION(BAZETT): 460 MS
QTC FREDERICIA: 437 MS
R AXIS: 55 DEGREES
RBC # BLD AUTO: 4.79 X10*6/UL (ref 4.5–5.9)
SODIUM SERPL-SCNC: 134 MMOL/L (ref 136–145)
T AXIS: 51 DEGREES
T OFFSET: 413 MS
UFH PPP CHRO-ACNC: 0.1 IU/ML
VENTRICULAR RATE: 82 BPM
WBC # BLD AUTO: 15.4 X10*3/UL (ref 4.4–11.3)

## 2025-04-11 PROCEDURE — 2500000002 HC RX 250 W HCPCS SELF ADMINISTERED DRUGS (ALT 637 FOR MEDICARE OP, ALT 636 FOR OP/ED): Performed by: STUDENT IN AN ORGANIZED HEALTH CARE EDUCATION/TRAINING PROGRAM

## 2025-04-11 PROCEDURE — 85027 COMPLETE CBC AUTOMATED: CPT | Performed by: NURSE PRACTITIONER

## 2025-04-11 PROCEDURE — 92611 MOTION FLUOROSCOPY/SWALLOW: CPT | Mod: GN

## 2025-04-11 PROCEDURE — 36415 COLL VENOUS BLD VENIPUNCTURE: CPT | Performed by: NURSE PRACTITIONER

## 2025-04-11 PROCEDURE — 2500000002 HC RX 250 W HCPCS SELF ADMINISTERED DRUGS (ALT 637 FOR MEDICARE OP, ALT 636 FOR OP/ED): Performed by: NURSE PRACTITIONER

## 2025-04-11 PROCEDURE — 2500000004 HC RX 250 GENERAL PHARMACY W/ HCPCS (ALT 636 FOR OP/ED): Performed by: STUDENT IN AN ORGANIZED HEALTH CARE EDUCATION/TRAINING PROGRAM

## 2025-04-11 PROCEDURE — 74230 X-RAY XM SWLNG FUNCJ C+: CPT | Performed by: RADIOLOGY

## 2025-04-11 PROCEDURE — 85520 HEPARIN ASSAY: CPT | Performed by: STUDENT IN AN ORGANIZED HEALTH CARE EDUCATION/TRAINING PROGRAM

## 2025-04-11 PROCEDURE — 92610 EVALUATE SWALLOWING FUNCTION: CPT | Mod: GN

## 2025-04-11 PROCEDURE — 2500000001 HC RX 250 WO HCPCS SELF ADMINISTERED DRUGS (ALT 637 FOR MEDICARE OP): Performed by: STUDENT IN AN ORGANIZED HEALTH CARE EDUCATION/TRAINING PROGRAM

## 2025-04-11 PROCEDURE — 83605 ASSAY OF LACTIC ACID: CPT | Performed by: STUDENT IN AN ORGANIZED HEALTH CARE EDUCATION/TRAINING PROGRAM

## 2025-04-11 PROCEDURE — 80048 BASIC METABOLIC PNL TOTAL CA: CPT | Performed by: NURSE PRACTITIONER

## 2025-04-11 PROCEDURE — 74230 X-RAY XM SWLNG FUNCJ C+: CPT

## 2025-04-11 PROCEDURE — 2500000005 HC RX 250 GENERAL PHARMACY W/O HCPCS: Performed by: STUDENT IN AN ORGANIZED HEALTH CARE EDUCATION/TRAINING PROGRAM

## 2025-04-11 PROCEDURE — 2500000001 HC RX 250 WO HCPCS SELF ADMINISTERED DRUGS (ALT 637 FOR MEDICARE OP): Performed by: INTERNAL MEDICINE

## 2025-04-11 PROCEDURE — 1200000002 HC GENERAL ROOM WITH TELEMETRY DAILY

## 2025-04-11 PROCEDURE — 99239 HOSP IP/OBS DSCHRG MGMT >30: CPT | Performed by: STUDENT IN AN ORGANIZED HEALTH CARE EDUCATION/TRAINING PROGRAM

## 2025-04-11 PROCEDURE — 82947 ASSAY GLUCOSE BLOOD QUANT: CPT

## 2025-04-11 RX ORDER — ATORVASTATIN CALCIUM 40 MG/1
40 TABLET, FILM COATED ORAL NIGHTLY
Qty: 30 TABLET | Refills: 3 | Status: SHIPPED | OUTPATIENT
Start: 2025-04-11 | End: 2025-05-11

## 2025-04-11 RX ORDER — DEXTROSE MONOHYDRATE AND SODIUM CHLORIDE 5; .9 G/100ML; G/100ML
50 INJECTION, SOLUTION INTRAVENOUS CONTINUOUS
Status: ACTIVE | OUTPATIENT
Start: 2025-04-11 | End: 2025-04-12

## 2025-04-11 RX ORDER — HALOPERIDOL LACTATE 5 MG/ML
2 INJECTION, SOLUTION INTRAMUSCULAR ONCE
Status: COMPLETED | OUTPATIENT
Start: 2025-04-11 | End: 2025-04-11

## 2025-04-11 RX ORDER — CLOPIDOGREL BISULFATE 75 MG/1
75 TABLET ORAL DAILY
Qty: 30 TABLET | Refills: 11 | Status: SHIPPED | OUTPATIENT
Start: 2025-04-12 | End: 2026-04-11

## 2025-04-11 RX ORDER — ENOXAPARIN SODIUM 100 MG/ML
40 INJECTION SUBCUTANEOUS DAILY
Status: DISCONTINUED | OUTPATIENT
Start: 2025-04-12 | End: 2025-04-16 | Stop reason: HOSPADM

## 2025-04-11 RX ORDER — METOPROLOL TARTRATE 25 MG/1
25 TABLET, FILM COATED ORAL 2 TIMES DAILY
Qty: 60 TABLET | Refills: 3 | Status: SHIPPED | OUTPATIENT
Start: 2025-04-11 | End: 2025-05-11

## 2025-04-11 RX ORDER — NAPROXEN SODIUM 220 MG/1
81 TABLET, FILM COATED ORAL DAILY
Qty: 30 TABLET | Refills: 3 | Status: SHIPPED | OUTPATIENT
Start: 2025-04-12 | End: 2025-05-12

## 2025-04-11 RX ADMIN — ASPIRIN 81 MG: 81 TABLET, CHEWABLE ORAL at 09:53

## 2025-04-11 RX ADMIN — BARIUM SULFATE 10 ML: 0.81 POWDER, FOR SUSPENSION ORAL at 15:14

## 2025-04-11 RX ADMIN — GABAPENTIN 300 MG: 300 CAPSULE ORAL at 09:53

## 2025-04-11 RX ADMIN — DEXTROSE AND SODIUM CHLORIDE 50 ML/HR: 5; 900 INJECTION, SOLUTION INTRAVENOUS at 18:08

## 2025-04-11 RX ADMIN — BARIUM SULFATE 30 ML: 400 SUSPENSION ORAL at 15:15

## 2025-04-11 RX ADMIN — INSULIN GLARGINE 20 UNITS: 100 INJECTION, SOLUTION SUBCUTANEOUS at 21:46

## 2025-04-11 RX ADMIN — INSULIN HUMAN 2 UNITS: 100 INJECTION, SOLUTION PARENTERAL at 08:30

## 2025-04-11 RX ADMIN — LEVOTHYROXINE SODIUM 150 MCG: 75 TABLET ORAL at 06:43

## 2025-04-11 RX ADMIN — BARIUM SULFATE 30 ML: 400 PASTE ORAL at 15:15

## 2025-04-11 RX ADMIN — INSULIN HUMAN 1 UNITS: 100 INJECTION, SOLUTION PARENTERAL at 11:22

## 2025-04-11 RX ADMIN — INSULIN HUMAN 2 UNITS: 100 INJECTION, SOLUTION PARENTERAL at 18:57

## 2025-04-11 RX ADMIN — CLOPIDOGREL BISULFATE 75 MG: 75 TABLET, FILM COATED ORAL at 09:53

## 2025-04-11 RX ADMIN — INSULIN HUMAN 6 UNITS: 100 INJECTION, SOLUTION PARENTERAL at 03:57

## 2025-04-11 RX ADMIN — HALOPERIDOL LACTATE 2 MG: 5 INJECTION, SOLUTION INTRAMUSCULAR at 00:52

## 2025-04-11 ASSESSMENT — COGNITIVE AND FUNCTIONAL STATUS - GENERAL
MOBILITY SCORE: 19
EATING MEALS: A LITTLE
DAILY ACTIVITIY SCORE: 18
HELP NEEDED FOR BATHING: A LITTLE
CLIMB 3 TO 5 STEPS WITH RAILING: A LOT
WALKING IN HOSPITAL ROOM: A LITTLE
TOILETING: A LITTLE
MOVING TO AND FROM BED TO CHAIR: A LITTLE
PERSONAL GROOMING: A LITTLE
DRESSING REGULAR LOWER BODY CLOTHING: A LITTLE
DRESSING REGULAR UPPER BODY CLOTHING: A LITTLE
STANDING UP FROM CHAIR USING ARMS: A LITTLE

## 2025-04-11 ASSESSMENT — PAIN SCALES - GENERAL
PAINLEVEL_OUTOF10: 0 - NO PAIN
PAINLEVEL_OUTOF10: 0 - NO PAIN

## 2025-04-11 ASSESSMENT — PAIN - FUNCTIONAL ASSESSMENT: PAIN_FUNCTIONAL_ASSESSMENT: 0-10

## 2025-04-11 ASSESSMENT — ACTIVITIES OF DAILY LIVING (ADL): LACK_OF_TRANSPORTATION: NO

## 2025-04-11 NOTE — NURSING NOTE
Nurse entered patient room to take patient's blood sugar level. Patient refused but stated he felt bad like his sugar was high. Nurse educated patient on blood sugar monitoring and insulin administration. Patient still refused. Dr. Lloyd was contacted and he came to the floor to speak with patient. After speaking with the provider patient allowed nurse to take his blood sugar level. Provider changed insulin orders to what the patient takes at home and the patient allowed the nurse to administer insulin. Patient did however refuse his scheduled dose of prednisone.

## 2025-04-11 NOTE — DISCHARGE SUMMARY
Discharge Diagnosis  NSTEMI (non-ST elevated myocardial infarction) (Multi)    Issues Requiring Follow-Up      Discharge Meds     Medication List      ASK your doctor about these medications     atorvastatin 20 mg tablet; Commonly known as: Lipitor   Basaglar KwikPen U-100 Insulin 100 unit/mL (3 mL) pen; Generic drug:   insulin glargine   insulin regular 100 unit/mL injection; Commonly known as: HumuLIN   R,NovoLIN R   levothyroxine 150 mcg tablet; Commonly known as: Synthroid, Levoxyl   lisinopril 10 mg tablet       Test Results Pending At Discharge  Pending Labs       Order Current Status    Blood Culture Preliminary result    Blood Culture Preliminary result            Hospital Course   70-year-old male with past medical history of dementia was previously recommended institutionalization presented with chest pain he was admitted with a diagnosis of acute NSTEMI.  Underwent coronary angiography with 2 drug-eluting stents.  Except for intermittent confusion patient is doing well.  He will be discharged on dual antiplatelet therapy, statin, beta-blocker and blood pressure medications.  We will recommend continuous monitoring at home due to advanced dementia and if does not provide able long-term placement.  Social work is involved in discharge planning.  He has been coughing with food order a speech eval before discharge.    Pertinent Physical Exam At Time of Discharge  Physical Exam  Constitutional:       Comments: Oriented to place and time, intermittently gets confused and agitated   HENT:      Head: Normocephalic.   Cardiovascular:      Rate and Rhythm: Normal rate and regular rhythm.   Pulmonary:      Effort: Pulmonary effort is normal. No respiratory distress.   Abdominal:      General: There is no distension.      Palpations: Abdomen is soft.   Musculoskeletal:         General: No deformity.   Neurological:      General: No focal deficit present.      Mental Status: He is alert.   Psychiatric:         Mood  and Affect: Mood normal.         Outpatient Follow-Up  No future appointments.      Andre Hidalgo MD   Universal Safety Interventions

## 2025-04-11 NOTE — CONSULTS
"Nutrition Initial Assessment:   Nutrition Assessment    Reason for Assessment: Admission nursing screening    Patient is a 70 y.o. male presenting with NSTEMI  past medical history of vascular dementia, HTN, HLD, chronic pain on gabapentin, and poorly-controlled T1DM (per endocrine note 5/21/2024, type 1, previous records also show type 2) who presented to the ED for syncope and collapse.       Nutrition History:  Energy Intake: Poor < 50 % (25% intake x 1 documented meal)  Pain affecting nutrition status: N/A  Food and Nutrient History: RDN consult for MST score of 2. Attempted to meet with pt however pt sleeping at attempted visit. Discussed with RN who reports speech was consulted due to swallowing difficulty observed when helping pt with breakfast. Chart reviewed, per TTC note \"Son advises patient seldom leaves apartment and if needed he would assist. Son provides assistance, groceries, and  gets meds. Son did mention that patient likely does not self care or medicate as instructed MD\" - suspect nutrition status has been at decline based on chart review and gradual wt loss. Will trial Magic Cup with meals and follow for SLP recommendations.  Vitamin/Herbal Supplement Use: none per home med list       Anthropometrics:  Height: 177.8 cm (5' 10\")   Weight: 64.2 kg (141 lb 8.6 oz)   BMI (Calculated): 20.31  IBW/kg (Dietitian Calculated): 75.5 kg  Percent of IBW: 85 %                      Weight History:   Wt Readings from Last 10 Encounters:   04/10/25 64.2 kg (141 lb 8.6 oz)   05/21/24 67 kg (147.4 lb)   03/16/23 77.1 kg (170 lb)      Weight Change %:  Weight History / % Weight Change: 6 lb, 4.1% nonsignificant wt loss in 11 months, ~30 lb wt loss in 2 years  Significant Weight Loss: No    Nutrition Focused Physical Exam Findings:    Subcutaneous Fat Loss:   Orbital Fat Pads: Severe (dark circles, hollowing and loose skin)  Buccal Fat Pads: Severe (hollow, sunken and narrow face)  Muscle Wasting:  Temporalis: " Severe (hollowed scooping depression)  Pectoralis (Clavicular Region): Severe (protruding prominent clavicle)  Edema:  Edema: none  Physical Findings:  Skin: Negative    Nutrition Significant Labs:  BMP Trend:   Results from last 7 days   Lab Units 04/11/25  0639 04/10/25  0111   GLUCOSE mg/dL 244* 221*   CALCIUM mg/dL 8.8 7.4*   SODIUM mmol/L 134* 132*   POTASSIUM mmol/L 3.8 2.8*   CO2 mmol/L 24 21   CHLORIDE mmol/L 100 97*   BUN mg/dL 31* 54*   CREATININE mg/dL 1.12 1.99*    , A1C:  Lab Results   Component Value Date    HGBA1C 11.5 (H) 05/18/2024   , BG POCT trend:   Results from last 7 days   Lab Units 04/11/25  1105 04/11/25  1022 04/11/25  0628 04/11/25  0514 04/11/25  0317   POCT GLUCOSE mg/dL 188* 163* 224* 227* 277*        Nutrition Specific Medications:  Scheduled medications  aspirin, 81 mg, oral, Daily  atorvastatin, 40 mg, oral, Nightly  insulin glargine, 20 Units, subcutaneous, Nightly  insulin regular, 0-5 Units, subcutaneous, TID AC  levothyroxine, 150 mcg, oral, Daily  potassium chloride CR, 40 mEq, oral, Once    I/O:    ;      Dietary Orders (From admission, onward)       Start     Ordered    04/11/25 0901  Adult diet Cardiac; 70 gm fat; 2 - 3 grams Sodium  Diet effective now        Question Answer Comment   Diet type Cardiac    Fat restriction: 70 gm fat    Sodium restriction: 2 - 3 grams Sodium        04/11/25 0900    04/10/25 1007  May Participate in Room Service With Assistance  ( ROOM SERVICE MAY PARTICIPATE WITH ASSISTANCE)  Once        Question:  .  Answer:  Yes    04/10/25 1006                     Estimated Needs:   Total Energy Estimated Needs in 24 hours (kCal): 1926 kCal  Method for Estimating Needs: 30 kcal/kg ABW  Total Protein Estimated Needs in 24 Hours (g): 77 g  Method for Estimating 24 Hour Protein Needs: 1.2 g/kg ABW  Total Fluid Estimated Needs in 24 Hours (mL): 1926 mL  Method for Estimating 24 Hour Fluid Needs: 1 ml/kcal or per MD  Patient on Order Fluid Restriction: No         Nutrition Diagnosis   Malnutrition Diagnosis  Patient has Malnutrition Diagnosis: Yes  Diagnosis Status: New  Malnutrition Diagnosis: Severe malnutrition related to chronic disease or condition  Related to: hx dementia  As Evidenced by: severe muscle wasting, severe fat loss  Additional Assessment Information: suspect poor oral intake as well although time frame is unknown            Nutrition Interventions/Recommendations   Nutrition prescription for oral nutrition    Nutrition Recommendations:  Individualized Nutrition Prescription Provided for : Cardiac, 75 g Carb Control diet with ONS, texture/consistency per SLP    Nutrition Interventions/Goals:   Meals and Snacks: Carbohydrate-modified diet, Fat-modified diet, General healthful diet, Mineral-modified diet  Goal: Consumes 3 meals per day  Medical Food Supplement: Commercial beverage medical food supplement therapy  Goal: Magic Cup Reduced Sugar TID (provides 280 kcal and 9 g protein per serving).  Coordination of Care with Providers: Nursing      Education Documentation  No documentation found.    Not appropriate at this time        Nutrition Monitoring and Evaluation   Food/Nutrient Related History Monitoring  Monitoring and Evaluation Plan: Intake / amount of food, Estimated Energy Intake  Estimated Energy Intake: Energy intake 50 -75% of estimated energy needs  Intake / Amount of food: Consumes at least 50% or more of meals/snacks/supplements    Anthropometric Measurements  Monitoring and Evaluation Plan: Body weight  Body Weight: Body weight - Maintain stable weight    Biochemical Data, Medical Tests and Procedures  Monitoring and Evaluation Plan: Electrolyte/renal panel, Glucose/endocrine profile  Electrolyte and Renal Panel: Electrolytes within normal limits  Glucose/Endocrine Profile: Glucose within normal limits ( mg/dL)              Time Spent (min): 40 minutes

## 2025-04-11 NOTE — PROGRESS NOTES
04/11/25 1212   Discharge Planning   Living Arrangements Children   Support Systems Children   Assistance Needed 24/7 supervision-   Type of Residence Private residence  (3rd story apartment , steps no elevator access)   Who is requesting discharge planning? Provider   Home or Post Acute Services None   Type of Post Acute Facility Services   (discussed long term care with son Chase)   Expected Discharge Disposition Home   Does the patient need discharge transport arranged? No   RoundTrip coordination needed? No   Has discharge transport been arranged? No   Financial Resource Strain   How hard is it for you to pay for the very basics like food, housing, medical care, and heating? Pt Unable   Housing Stability   In the last 12 months, was there a time when you were not able to pay the mortgage or rent on time? Pt Unable   At any time in the past 12 months, were you homeless or living in a shelter (including now)? Pt Unable   Transportation Needs   In the past 12 months, has lack of transportation kept you from medical appointments or from getting medications? no   In the past 12 months, has lack of transportation kept you from meetings, work, or from getting things needed for daily living? No   Stroke Family Assessment   Stroke Family Assessment Needed No   Intensity of Service   Intensity of Service 0-30 min     Pt admitted from home with acute NSTEMI, reportedly patient was confused - improved pt has PMH of vascular dementia.  Spoke with Son Chase who states his father resides with him in a 3rd story apartment.  There is no elevator access , pt uses stairs.  Chase states pt does not leave apartment only approx 1- 2 xs per month.  Pt has had no issue with stair climbing in the past and son is states no concern.  Pt uses no assistive devices for ambulation.  He is able to dress, bathe self and can do simple meal prep.  Son confirms that he does grocery shopping, cooking and cleaning.  He states he has cameras  throughout apartment so he can monitor patient needs while he is at work.     He is aware that pt has LD and speech therapy following and states he will assist with meal prep following their recommendations- TBD.    Discussed long term care facilities and need for 24/7 supervision. Son states he will take SNF list and look into LTC options .     Pt follows with [facility] provider and is current per son.  Declines needs at this time .  Son to provide transportation home this evening.

## 2025-04-11 NOTE — NURSING NOTE
Report called to TIMOTHY Laguna on 8S. Informed that patient arrived to ED this AM S/P syncope and collapse, closed head injury, rib fractures, vascular dementa, DM, CKD, HTN, HLD. Patient able to state name and place, reoriented to procedure and time of day. This RN spoke to patient's son, Chase, he was at home but updated that procedure was completed and would remain in recovery until approximately 2100. Rt radial site with 2x2 dressing remains dry and intact, no hematoma, no ecchymosis. Pink limb alert placed. If radial site remains stable for 30 minutes after dressing applied, will bring patient up to 8S at 2110. Also infoemed 2040 BP 91/52, HR 82, RR 16 and pulse ox RA 93-95%, Patient has IV infusing at 75 ml/hr and has voided large amount, incontinent. Otherwise, patient has been sleeping, easily arousable, had Benedryl and prednisone pre-procedure. Informed of K 2.8 on admit, no repeat was done, upon review of chart see that it was replaced with 20 meq KCL IV in ED, documented that patient had refused the oral dose. Also informed Timothy Laguna that attempted to give patient sips of water, but coughs with thin liquids. Will transfr patient to 8S at this time.

## 2025-04-11 NOTE — NURSING NOTE
Patient was repeatedly attempting to get out of bed and was pulling at IV sites and external cathter device. Patient was given repeated education on fall safety and necessity of IV sites but behavior did not cease. Patient was given 1:1 sitter and Dr. Lloyd was contacted. IV Haldol was given but had no effect on behavior. After 1 hour when behavior did not stop Dr. Lloyd was contacted again and another dose of IV Haldol was given. This also did not stop the behavior so Dr. Lloyd was contacted again and soft bilateral wrist restraints were ordered and placed on patient. Richie Chasepalmira Salinas was called and notified.

## 2025-04-11 NOTE — NURSING NOTE
Nurse took patients blood sugar. The sugar resulted as 278 nurse then brought in 3 units of sliding scale insulin lispro to administer to patient. Nurse educated patient on what medication was and why he was receiving it. Patient refused and stated he believed the nurse was lying because he does not take that kind of insulin at home. Nurse educated patient on orders and short acting insulin and asked if patient would like to speak with provider. Dr. Lloyd was contacted and notified of the patient's refusal and reason and instructed the nurse to dav off the insulin as refused.

## 2025-04-11 NOTE — CARE PLAN
The patient's goals for the shift include rest     The clinical goals for the shift include Patient will remain safe

## 2025-04-11 NOTE — NURSING NOTE
Patient transferred via bed , tele, IV pump to 8S. JEFF Laguna met in the room, rt radial site remains stable. Informed Luz Elena that Heparin drip was stopped prior to cath procedure, but will need to be discontinued in the orders as it was not addressed by Dr. Chapman, but per routine the Heparin is stopped once cath procedure is done, Also aware that patient received Plavix 600 mg orally in cath lab. Time in recovery from 1740 until 2115.

## 2025-04-11 NOTE — NURSING NOTE
Rt radial vasc band removed completely at 2030, cleansed site with chloroprep, then patient had a bleed, manual pressure held x 7 minutes, no further bleeding then applied 2x2 and tegaderm dressing. Rt radial site remains stable. Will observe for 30 more minutes and then transfer up to 8S if site remains stable.

## 2025-04-11 NOTE — PROGRESS NOTES
Contacted son Chase for follow up discharge  planning needs.  Pt underwent MBS, and recommendations are strict NPO at this time .   PT OT evals ordered- pending in for a.m. evals.   Chase states he spoke with physician and has been updated on swallow evaluation. States it is hard for him to believe as he has never witnessed his dad choking at any time while eating.  He is not open to PEG tube - per previous discussion with physician and he told this TCC he promised his dad no long term care and no feeding tube.  Chase states he does not have other family to rely on and is feeling overwhelmed.  Emotional support provided .    Chase is agreeable to further discussions with staff for his fathers needs and will look over SNF list at bedside.  Will request TCC/ SW follow up in the a.m.

## 2025-04-11 NOTE — PROGRESS NOTES
History Of Present Illness:    Jw Salinas is a 70 y.o. male presenting with syncope.  Patient with past medical history of dementia, hypertension, hyperlipidemia, chronic pain, diabetes who presented with syncope and collapse.  Noted to have significant elevated cardiac enzyme at 13,000.  Creatinine is 1.99      4-11-25: s/p PCI of mid LAD with DESx2. Renal function normal now.      Last Recorded Vitals:  Vitals          Vitals:     04/10/25 0730 04/10/25 0750 04/10/25 1045 04/10/25 1518   BP: 98/55 95/53 100/56 101/55   BP Location: Right arm   Right arm Right arm   Patient Position: Lying   Lying Lying   Pulse: 82 83 85 87   Resp: 18   18 18   Temp: 36.9 °C (98.4 °F)   37.5 °C (99.5 °F) 37 °C (98.6 °F)   TempSrc: Temporal   Temporal Temporal   SpO2: 96% 98% 98% 95%   Weight:           Height:                    Last Labs:  CBC - 4/10/2025:  1:11 AM  20.5 12.5 283    34.0       CMP - 4/10/2025:  1:11 AM  7.4 5.5 39 --- 0.5   2.7 3.4 13 44       PTT - No results in last year.      1.0   11.0 _              Troponin I, High Sensitivity   Date/Time Value Ref Range Status   04/10/2025 10:18 AM 11,322 (HH) 0 - 20 ng/L Final       Comment:       Previous result verified on 4/10/2025 0151 on specimen/case [id number] called with component TRPHS for procedure Troponin I, High Sensitivity, Initial with value 10,515 ng/L.   04/10/2025 03:12 AM 13,356 (HH) 0 - 20 ng/L Final       Comment:       Previous result verified on 4/10/2025 0151 on specimen/case [id number] called with component TRPHS for procedure Troponin I, High Sensitivity, Initial with value 10,515 ng/L.   04/10/2025 01:11 AM 10,515 (HH) 0 - 20 ng/L Final              Hemoglobin A1C   Date/Time Value Ref Range Status   05/18/2024 03:08 AM 11.5 (H) 4.0 - 6.0 % Final   04/24/2024 08:29 AM 11.9 (H) 4.3 - 5.6 % Final       Comment:       American Diabetes Association guidelines indicate that patients with HgbA1c in the range 5.7-6.4% are at increased risk  "for development of diabetes, and intervention by lifestyle modification may be beneficial. HgbA1c greater or equal to 6.5% is considered diagnostic of diabetes.   03/16/2023 10:46 AM 9.6 (H) 4.3 - 5.6 % Final       Comment:       American Diabetes Association guidelines indicate that patients with HgbA1c in the range 5.7-6.4% are at increased risk for development of diabetes, and intervention by lifestyle modification may be beneficial. HgbA1c greater or equal to 6.5% is considered diagnostic of diabetes.      Last I/O:  I/O last 3 completed shifts:  In: 1099 (17.1 mL/kg) [IV Piggyback:1099]  Out: - (0 mL/kg)   Weight: 64.2 kg      Past Cardiology Tests (Last 3 Years):  EKG:  ECG 12 lead 04/10/2025 (Preliminary)        ECG 12 lead 04/10/2025        ECG 12 lead 04/10/2025     Echo:  No results found for this or any previous visit from the past 1095 days.     Ejection Fractions:  No results found for: \"EF\"  Cath:  No results found for this or any previous visit from the past 1095 days.     Stress Test:  No results found for this or any previous visit from the past 1095 days.     Cardiac Imaging:  No results found for this or any previous visit from the past 1095 days.        Past Medical History:  He has no past medical history on file.     Past Surgical History:  He has no past surgical history on file.      Social History:  He has no history on file for tobacco use, alcohol use, and drug use.     Family History:  Family History   No family history on file.        Allergies:  Patient has no known allergies.     Inpatient Medications:  Scheduled Medications          Scheduled medications   Medication Dose Route Frequency    aspirin  81 mg oral Daily    atorvastatin  40 mg oral Nightly    gabapentin  300 mg oral TID    insulin glargine  20 Units subcutaneous Nightly    insulin lispro  0-5 Units subcutaneous q4h    levothyroxine  150 mcg oral Daily    morphine  4 mg intravenous Once    potassium chloride CR  40 mEq oral " Once    predniSONE  50 mg oral q6h         PRN Medications       PRN medications   Medication    acetaminophen     Or    acetaminophen     Or    acetaminophen    dextrose    dextrose    glucagon    glucagon    heparin    melatonin    polyethylene glycol         Continuous Medications         Continuous Medications   Medication Dose Last Rate    heparin  0-4,000 Units/hr 700 Units/hr (04/10/25 0855)         Outpatient Medications:       Current Outpatient Medications   Medication Instructions    atorvastatin (LIPITOR) 20 mg, Daily    Basaglar KwikPen U-100 Insulin 20 Units, Nightly    insulin regular (HUMULIN R,NOVOLIN R) 10 Units, 3 times daily before meals    levothyroxine (SYNTHROID, LEVOXYL) 150 mcg, Daily    lisinopril 10 mg, Daily      Review of Systems   Unable to perform ROS: Dementia      Physical Exam:  Physical Exam  Constitutional:       Appearance: Normal appearance.   HENT:      Head: Normocephalic and atraumatic.      Mouth/Throat:      Mouth: Mucous membranes are moist.      Pharynx: Oropharynx is clear.   Eyes:      Extraocular Movements: Extraocular movements intact.      Conjunctiva/sclera: Conjunctivae normal.      Pupils: Pupils are equal, round, and reactive to light.   Neck:      Vascular: No carotid bruit.   Cardiovascular:      Rate and Rhythm: Normal rate and regular rhythm.   Pulmonary:      Effort: Pulmonary effort is normal.      Breath sounds: Normal breath sounds. No wheezing or rales.   Abdominal:      General: Bowel sounds are normal. There is no distension.      Tenderness: There is no abdominal tenderness. There is no guarding.   Musculoskeletal:      Cervical back: Normal range of motion and neck supple.   Skin:     General: Skin is warm.   Neurological:      General: No focal deficit present.      Mental Status: Mental status is at baseline.   Psychiatric:         Mood and Affect: Mood normal.            Assessment:  Non-ST elevation MI  S/p PCI of mid LAD with DESx2  Syncope rule  out cardiac ischemia.  Rule out arrhythmia rule out valvular heart disease  Hypertension  Hyperlipidemia  Diabetes mellitus  Renal failure- resolved.    normal LVF by echo.      Plan:    DAPT  Maximize cardiac medications  Monitor on telemetry  GI/DVT prophylaxis  Ok to dc home.

## 2025-04-11 NOTE — CARE PLAN
Patient was having some trouble swallowing he failed speech and swallow and MBS.  I talked to the son Nicolas in detail he said patient would not want a feeding tube, he does not want his father to be fed through the tube for the rest of his life.  Says that he was told the same thing about 2 years ago but his father did well at this point he just wants to take him home and continue speech therapy at home.  He is aware of the risks of aspiration, choking, recurrent infections.

## 2025-04-11 NOTE — PROGRESS NOTES
Speech-Language Pathology  Inpatient Speech-Language Pathology Clinical Swallow Evaluation       Patient Name: Jw Salinas  MRN: [id number]  : 1955  Patient Room: [address] [place], [place] [address]  Today's Date: 25  Time Calculation  Start Time: 1250  Stop Time: 1304  Time Calculation (min): 14 min         Recommendations:  Solid Diet Recommendations : Pureed/extremely thick  (IDDSI Level 4)   Liquid Diet Recommendations: NPO   Compensatory Swallowing Strategies: Small bites/sips, Eat/feed slowly     Medication Administration Recommendations: Crushed, With Pureed    Medical Staff Made Aware: Yes  Functional Oral Intake Scale: Level 4        total oral diet of a single consistency    Assessment:  Consistencies Trialed: Consistencies Trialed: Thin (IDDSI Level 0) - Straw, Pureed/extremely thick (IDDSI Level 4), Soft & bite sized/chopped (IDDSI Level 6)   Assessment Results:   Pt presenting with oral and suspected pharyngeal dysphagia. Patient demonstrated difficulty with mastication with soft and bite sized trials. Prolonged and inefficient mastication was observed with mac and cheese. Patient was unable to clear oral cavity with soft and bite sized trials post swallow, requiring use of liquid wash.   Suspected impaired pharyngeal phase of swallow. Overt s/s of aspiration were not observed with puree or soft and bite sized, but patient coughed intermittently with thin liquids. Possible aspiration. RN reported pt has been coughing with intake also. Recommending an MBSS to further assess swallow function and physiology to determine safest diet.      Oral Mechanism Exam   Trigeminal Nerve (V)   Jaw open/close: Adequate     Bite:  Adequate    Sensation of face:  Adequate    Sensation of tongue: Adequate    Facial Nerve (VII)    Symmetry at rest: Adequate    Eyebrow raise: Adequate    Lip pucker: Adequate    Lip retraction/smile: Adequate      Cheek puffed: DNT  Taste: Adequate    Vagus (X)  Volitional cough: Adequate    Hypoglossal  (XII)       Lingual protrusion: DNT  Tongue ROM/laterization, elevation: DNT  Tongue strength: DNT  Dentition: Natural dentition , Partially missing dentition  Respiratory Status: Room air         Plan:  SLP Plan: Skilled SLP Skilled speech therapy for dysphagia treatment is warranted in order to provide training and instruction regarding the use of compensatory swallow strategies, assess tolerance of diet and pt/caregiver education in order to reduce risk of aspiration, dehydration and malnutrition.  SLP Frequency: 2x per week   Duration: 30 days   Next Treatment Priority: MBSS   Discussed POC: Patient, Nursing   Discussed Risks/Benefits: Yes   Patient/Caregiver Agreeable: Yes   Prognosis: Good  Barriers to improvement: comorbidities  SLP Discharge Recommendations: Continue high intensity SLP at next level    Goals:  Goals established on 25   Duration: 30 days  - Patient will tolerate current diet without noted pulmonary compromise or evidence of pulmonary sequela as noted in patient chart and/or reported by patient/family.  -Patient will complete MBSS to further assess swallow physiology and determine least restrictive diet.   Subjective   Patient seen for bedside swallow evaluation.  Patient Positioning: Upright in Bed  Pain:  Ratin-10   Pt report: 0  Action taken: none needed    General Visit Information:  Pt. Admitted on 4/10/2025, for: report of syncope and collapse  Past medical history: Jw Salinas is a 70 y.o. male with a past medical history of vascular dementia, HTN, HLD, chronic pain on gabapentin, and poorly-controlled T1DM (per endocrine note 2024, type 1, previous records also show type 2).  Living Environment: Home           Reason for Referral: Coughing on thins      Current Diet : Puree (IDDSI Level 4), NPO   Prior to Session Communication: Bedside nurse     Treatment Completed with evaluation:   No      SLP Inpatient Education:  Learner patient   Barriers to Learning Acuteness of  the illness, Cognitive limitations   Method Verbal   Education - Topic ST provided patient education regarding role of ST, purpose of assessment, clinical impressions, and goals of care    Outcome    1= partially meets; needs review

## 2025-04-11 NOTE — PROCEDURES
Speech-Language Pathology    Inpatient  Modified Barium Swallow Study     Patient Name: Jw Salinas  MRN: [id number]  : 1955  806/806-A   Today's Date: 25  Time Calculation  Start Time: 1450  Stop Time: 1525  Time Calculation (min): 35 min       Modified Barium Swallow Study completed. Informed verbal consent obtained prior to completion of exam. Unable to follow protocol due to patient safety. The anatomic structures and function of the oropharynx, larynx, hypopharynx and cervical esophagus were evaluated.    Reason for referral: Suspected aspiration during bedside swallow evaluation  Patient hx: Pt. Admitted on 4/10/2025, for: report of syncope and collapse. NSTEMI  Past medical history: Jw Salinas is a 70 y.o. male with a past medical history of vascular dementia, HTN, HLD, chronic pain on gabapentin, and poorly-controlled T1DM (per endocrine note 2024, type 1, previous records also show type 2).  Respiratory status: room air    Pain: Pain Scale: 0-10  Ratin     Location: N/A  Type: N/A  Action Taken: None needed   Current diet: NPO    SPEECH RECOMMENDATIONS:  Diet recommendations: NPO with alternative means of nutrition vs. puree with nectar thick liquids understanding aspiration will occur.   Dr Hidalgo stated he spoke with son and pt/son do not want any type of alternative means of nutrition. Recommend puree/nectar thick liquids as least restrictive diet with least amount of aspiration occurring.   Would also recommend palliative care consult for goals of care discussion.   Swallow Strategies: If choosing intake:  - small bites/sips  - upright for PO intake  -alternate bites/sips     Plan:  SLP Plan: Skilled SLP warranted  Treatment/Interventions: - Oropharyngeal exercises  - Bolus trials  - Diet tolerance/advancement  - Patient/caregiver education  SLP Frequency: x1-2 follow ups   Duration: 30 days   Discussed POC: Patient  Discussed Risks/Benefits: Yes  Patient/Caregiver Agreeable:  Yes    Short term goals: established 04/11/25   - Patient will tolerate current diet without noted pulmonary compromise or evidence of pulmonary sequela as noted in patient chart and/or reported by patient/family.  - Patient will independently implement safe swallowing strategies to reduce risk of aspiration with use of compensatory strategies during 90% of therapeutic trials.  - Pt will complete effortful swallows 10 reps, 3 x a day for 7 days a week; to strengthen pharyngeal muscles for improve bolus clearance through the pharynx.    - Pt will complete isokinetic CTAR 3-5 seconds; 10 times, independent of cues, to improve hyolaryngeal elevation and UES opening; impacting airway protection and pharyngeal clearance during the swallow.   - Pt will compete jose maneuver 10 times, 3x a day, 7 days a week, independent of cues to improve posterior pharyngeal wall contraction/strength necessary for bolus clearance through the pharynx.  (Can attempt exercises however pt currently unable to consistently follow directions to complete)     Education: SLP provided education to Patient regarding MBSS impressions, recommendations and POC at this time. Verbal understanding and agreement given on all accounts.     Additional medical consult suggested: - No new disciplines indicated  Repeat study/discharge plan: Repeat study as clinically indicated per MD or SLP         MBSImP Physiological Component  ORAL PHASE:  Lip Closure - No labial escape/anterior loss of bolus   Tongue Control During Bolus Hold - Posterior escape of less than half of the bolus   Bolus prep/mastication - Not assessed due to pt safety  Bolus transport/lingual motion - Repetitive/disorganized tongue motion (tongue pumping)   Oral residue - Residue collection on oral structure     PHARYNGEAL PHASE:  Initiation of pharyngeal swallow - Bolus head at pit of pyriforms   Soft palate elevation - No bolus between soft palate/pharyngeal wall   Laryngeal elevation -  Partial superior movement of thyroid cartilage and/or partial approximation of arytenoids to epiglottic petiole   Anterior hyoid excursion - Partial anterior movement   Epiglottic movement - partial inversion    Laryngeal vestibule closure - Incomplete - narrow column of air/contrast in laryngeal vestibule   Pharyngeal stripping wave - Present, however, diminished   Pharyngeal contraction (A/P view) - Not tested       Pharyngoesophageal segment opening - Partial distension/partial duration with partial obstruction of flow of bolus   Tongue base retraction - No bolus between tongue base and posterior pharyngeal wall   Pharyngeal residue - Majority of contrast within or on the pharyngeal structures   Laryngeal penetration - Observed with thin, nectar, and puree  Aspiration - Observed with thin, nectar, and puree  Response to aspiration- Silent or delayed coughing   Strategies attempted- Coughing - not successful    ESOPHAGEAL PHASE:  Esophageal clearance - Not evaluated       SLP Impression statement:   Pt presents with severe oropharyngeal dysphagia characterized by deficits noted above. Swallowing physiology is detailed above. Impairments most impacting swallowing safety and efficiency include repetitive tongue motion for bolus transport, collection of residue on oral structures, posterior escape of bolus, delayed swallow initiation, diminished laryngeal elevation, incomplete laryngeal vestibule closure, collection of residue in valleculae after the swallow, and aspiration on all consistencies.      Scales and Outcome Measures:   Rosenbek's Penetration Aspiration Scale (rated based on worst swallow per consistency)  Thin Liquids: 8. SILENT ASPIRATION - contrast passes glottis, visible residue, NO pt response]   Nectar Thick Liquids: 8. SILENT ASPIRATION - contrast passes glottis, visible residue, NO pt response]   Puree: 8. SILENT ASPIRATION - contrast passes glottis, visible residue, NO pt response]          Functional Oral Intake Scale  Functional Oral Intake Scale: Level 1        nothing by mouth

## 2025-04-12 ENCOUNTER — APPOINTMENT (OUTPATIENT)
Dept: RADIOLOGY | Facility: HOSPITAL | Age: 70
DRG: 280 | End: 2025-04-12
Payer: MEDICARE

## 2025-04-12 PROBLEM — I21.4 NSTEMI (NON-ST ELEVATED MYOCARDIAL INFARCTION) (MULTI): Status: RESOLVED | Noted: 2025-04-10 | Resolved: 2025-04-12

## 2025-04-12 LAB
GLUCOSE BLD MANUAL STRIP-MCNC: 121 MG/DL (ref 74–99)
GLUCOSE BLD MANUAL STRIP-MCNC: 167 MG/DL (ref 74–99)
GLUCOSE BLD MANUAL STRIP-MCNC: 180 MG/DL (ref 74–99)
GLUCOSE BLD MANUAL STRIP-MCNC: 246 MG/DL (ref 74–99)
GLUCOSE BLD MANUAL STRIP-MCNC: 278 MG/DL (ref 74–99)
GLUCOSE BLD MANUAL STRIP-MCNC: 92 MG/DL (ref 74–99)

## 2025-04-12 PROCEDURE — 1200000002 HC GENERAL ROOM WITH TELEMETRY DAILY

## 2025-04-12 PROCEDURE — 2500000004 HC RX 250 GENERAL PHARMACY W/ HCPCS (ALT 636 FOR OP/ED): Performed by: STUDENT IN AN ORGANIZED HEALTH CARE EDUCATION/TRAINING PROGRAM

## 2025-04-12 PROCEDURE — 2500000002 HC RX 250 W HCPCS SELF ADMINISTERED DRUGS (ALT 637 FOR MEDICARE OP, ALT 636 FOR OP/ED): Performed by: NURSE PRACTITIONER

## 2025-04-12 PROCEDURE — 97165 OT EVAL LOW COMPLEX 30 MIN: CPT | Mod: GO

## 2025-04-12 PROCEDURE — 70551 MRI BRAIN STEM W/O DYE: CPT | Performed by: RADIOLOGY

## 2025-04-12 PROCEDURE — 82947 ASSAY GLUCOSE BLOOD QUANT: CPT

## 2025-04-12 PROCEDURE — 97161 PT EVAL LOW COMPLEX 20 MIN: CPT | Mod: GP | Performed by: PHYSICAL THERAPIST

## 2025-04-12 PROCEDURE — 2500000002 HC RX 250 W HCPCS SELF ADMINISTERED DRUGS (ALT 637 FOR MEDICARE OP, ALT 636 FOR OP/ED): Performed by: STUDENT IN AN ORGANIZED HEALTH CARE EDUCATION/TRAINING PROGRAM

## 2025-04-12 PROCEDURE — 99232 SBSQ HOSP IP/OBS MODERATE 35: CPT | Performed by: STUDENT IN AN ORGANIZED HEALTH CARE EDUCATION/TRAINING PROGRAM

## 2025-04-12 PROCEDURE — 70551 MRI BRAIN STEM W/O DYE: CPT

## 2025-04-12 RX ADMIN — INSULIN HUMAN 3 UNITS: 100 INJECTION, SOLUTION PARENTERAL at 18:08

## 2025-04-12 RX ADMIN — INSULIN HUMAN 1 UNITS: 100 INJECTION, SOLUTION PARENTERAL at 13:13

## 2025-04-12 RX ADMIN — INSULIN GLARGINE 20 UNITS: 100 INJECTION, SOLUTION SUBCUTANEOUS at 22:08

## 2025-04-12 RX ADMIN — ENOXAPARIN SODIUM 40 MG: 40 INJECTION SUBCUTANEOUS at 10:22

## 2025-04-12 RX ADMIN — INSULIN HUMAN 2 UNITS: 100 INJECTION, SOLUTION PARENTERAL at 20:05

## 2025-04-12 RX ADMIN — DEXTROSE AND SODIUM CHLORIDE 50 ML/HR: 5; 900 INJECTION, SOLUTION INTRAVENOUS at 13:34

## 2025-04-12 ASSESSMENT — COGNITIVE AND FUNCTIONAL STATUS - GENERAL
EATING MEALS: A LITTLE
DRESSING REGULAR UPPER BODY CLOTHING: A LITTLE
PERSONAL GROOMING: A LITTLE
DAILY ACTIVITIY SCORE: 18
PERSONAL GROOMING: A LOT
MOVING TO AND FROM BED TO CHAIR: A LOT
DRESSING REGULAR UPPER BODY CLOTHING: A LITTLE
DAILY ACTIVITIY SCORE: 14
DRESSING REGULAR LOWER BODY CLOTHING: A LOT
CLIMB 3 TO 5 STEPS WITH RAILING: A LOT
TOILETING: A LOT
MOBILITY SCORE: 16
CLIMB 3 TO 5 STEPS WITH RAILING: TOTAL
MOVING FROM LYING ON BACK TO SITTING ON SIDE OF FLAT BED WITH BEDRAILS: A LITTLE
HELP NEEDED FOR BATHING: A LOT
EATING MEALS: A LITTLE
DRESSING REGULAR LOWER BODY CLOTHING: A LITTLE
STANDING UP FROM CHAIR USING ARMS: A LOT
WALKING IN HOSPITAL ROOM: A LOT
STANDING UP FROM CHAIR USING ARMS: A LOT
TOILETING: A LITTLE
WALKING IN HOSPITAL ROOM: A LOT
HELP NEEDED FOR BATHING: A LITTLE
TURNING FROM BACK TO SIDE WHILE IN FLAT BAD: A LITTLE
MOVING TO AND FROM BED TO CHAIR: A LOT
MOBILITY SCORE: 13

## 2025-04-12 ASSESSMENT — PAIN SCALES - GENERAL
PAINLEVEL_OUTOF10: 0 - NO PAIN

## 2025-04-12 ASSESSMENT — ACTIVITIES OF DAILY LIVING (ADL): BATHING_ASSISTANCE: MODERATE

## 2025-04-12 ASSESSMENT — PAIN - FUNCTIONAL ASSESSMENT: PAIN_FUNCTIONAL_ASSESSMENT: 0-10

## 2025-04-12 NOTE — PROGRESS NOTES
Occupational Therapy    Evaluation    Patient Name: Jw Salinas  MRN: [id number]  Today's Date: 4/12/2025  Time Calculation  Start Time: 0743  Stop Time: 0756  Time Calculation (min): 13 min  806/806-A    Assessment  IP OT Assessment  OT Assessment: Pt presents with decreased strength, endurance, standing tolerance/balance which impact his ADL and functional transfer status. Pt would benefit from skilled OT to address deficits.  Prognosis: Fair  Barriers to Discharge Home: Caregiver assistance, Physical needs, Cognition needs  Caregiver Assistance: Caregiver assistance needed per identified barriers - however, level of patient's required assistance exceeds assistance available at home  Cognition Needs: 24hr supervision for safety awareness needed, Medication and/or medical management daily assist needed, Insight of patient limited regarding functional ability/needs, Cognition-related high falls risk  Physical Needs: Stair navigation into home limited by function/safety, In-home setup navigation limited by function/safety, Ambulating household distances limited by function/safety, Intermittent ADL assistance needed, High falls risk due to function or environment, 24hr mobility assistance needed  Evaluation/Treatment Tolerance: Patient tolerated treatment well  Medical Staff Made Aware: Yes  End of Session Communication: Physician  End of Session Patient Position: Bed, 3 rail up, Alarm on    Plan:  Treatment Interventions: ADL retraining, Functional transfer training, UE strengthening/ROM, Endurance training, Cognitive reorientation, Patient/family training  OT Frequency: 2 times per week  OT Discharge Recommendations: Moderate intensity level of continued care  OT Recommended Transfer Status: Assist of 2  OT - OK to Discharge: Yes (ok to d/c to next level of care once medically cleared)    Subjective     Current Problem:  1. NSTEMI (non-ST elevated myocardial infarction) (Multi)  Transthoracic Echo (TTE) Complete     Transthoracic Echo (TTE) Complete    Case Request Cath Lab: Left Heart Cath    Case Request Cath Lab: Left Heart Cath    Cardiac Catheterization Procedure    Cardiac Catheterization Procedure    Referral to Cardiac Rehab - outpatient (for providers who will be following patient along cardiac rehab)    atorvastatin (Lipitor) 40 mg tablet    clopidogrel (Plavix) 75 mg tablet    aspirin 81 mg chewable tablet    metoprolol tartrate (Lopressor) 25 mg tablet    Referral to Primary Care    Referral to Home Care      2. Syncope and collapse        3. Closed head injury, initial encounter        4. Acute renal failure, unspecified acute renal failure type        5. Hypomagnesemia        6. Hypokalemia        7. Hypocalcemia        8. Acute cervical myofascial strain, initial encounter        9. Hyperglycemia        10. Closed fracture of multiple ribs of right side, initial encounter            General:  General  Reason for Referral: ADL impairment  Referred By: PT/OT 4/11/25 Rocky  Past Medical History Relevant to Rehab: HTN, HLD, Dementia, DM  Family/Caregiver Present: No  Co-Treatment: PT  Co-Treatment Reason: to maximize patient/staff safety  Prior to Session Communication: Bedside nurse  Patient Position Received: Bed, 4 rail up, Alarm on  General Comment: To ED 4/10/25 for syncope and collapse. EMS called to home earlier in day but melissa refused transport. Had a 2nd episode this time + LOC and hit head. Agreed to transport. NSTEMI.    Precautions:  Medical Precautions: Fall precautions    Pain:  Pain Assessment  Pain Assessment: 0-10  0-10 (Numeric) Pain Score: 0 - No pain    Objective     Cognition:  Overall Cognitive Status: Impaired at baseline (lethargic)  Orientation Level: Disoriented to place, Disoriented to time, Disoriented to situation  Following Commands:  (Follows simple commands ~75% of the time with increased time to process)  Memory:  (Impaired)  Problem Solving:  (Impaired)  Insight:   (Impaired)  Impulsive: Mildly  Processing Speed: Delayed             Home Living:  Home Living Comments: Pt reports living alone in duplex on upper level, has 13 steps to navigate with HR. Tub shower with no equipment. Per EMR, son has cameras in apartment to monitor pt.     Prior Function:  Prior Function Comments: Pt reports IND with mobility, ADLs, iADLs no device. Denies any recent falls. Does not drive. Son transports pt.    ADL:  Eating Assistance:  (S/U)  Grooming Assistance: Moderate (standing)  Bathing Assistance: Moderate  UE Dressing Assistance: Stand by  LE Dressing Assistance: Moderate  Toileting Assistance with Device: Moderate    Activity Tolerance:  Endurance: Decreased tolerance for upright activites    Bed Mobility/Transfers:   Bed Mobility  Bed Mobility:  (Transition from supine <> sit EOB with HOB 20 deg, use of HR with CGA.)  Transfers  Transfer:  (Sit to/from stand with MOD Ax2, difficulty initiating stand. Heavy retro lean against bed. Pt then able to ontain upright posture with use of FWW. Cues for hand placement prior to descent to bed.)    Ambulation/Gait Training:  Functional Mobility  Functional Mobility Performed:  (Pt completed short distance functional mobility in room forwards and backwards using FWW with MOD Ax2. Cues for FWW management and sequencing.)    Sitting Balance:  Static Sitting Balance  Static Sitting-Comment/Number of Minutes: Fair  Dynamic Sitting Balance  Dynamic Sitting-Comments: Fair    Standing Balance:  Static Standing Balance  Static Standing-Comment/Number of Minutes: Poor+  Dynamic Standing Balance  Dynamic Standing-Comments: Poor+    Strength:  Strength Comments: BUE strength grossly 4+/5    Hand Function:  Hand Function  Gross Grasp: Functional    Extremities: RUE   RUE : Within Functional Limits and LUE   LUE: Within Functional Limits    Outcome Measures: [facility] Daily Activity  Putting on and taking off regular lower body clothing: A lot  Bathing (including  washing, rinsing, drying): A lot  Putting on and taking off regular upper body clothing: A little  Toileting, which includes using toilet, bedpan or urinal: A lot  Taking care of personal grooming such as brushing teeth: A lot  Eating Meals: A little  Daily Activity - Total Score: 14                       EDUCATION:  Education  Individual(s) Educated: Patient  Education Provided: Fall precautons, Risk and benefits of OT discussed with patient or other, POC discussed and agreed upon  Patient Response to Education: Patient/Caregiver Verbalized Understanding of Information  Education Documentation  Body Mechanics, taught by Candy Galeano OT at 4/12/2025 11:41 AM.  Learner: Patient  Readiness: Acceptance  Method: Explanation, Demonstration  Response: Verbalizes Understanding, Demonstrated Understanding, Needs Reinforcement    Goals:   Encounter Problems       Encounter Problems (Active)       OT Goals       Pt will complete all functional transfers with SBA using FWW. (Progressing)       Start:  04/12/25    Expected End:  04/26/25            Pt will complete household distance functional mobility with SBA using FWW. (Progressing)       Start:  04/12/25    Expected End:  04/26/25            Pt will improve dynamic standing balance to fair during functional tasks. (Progressing)       Start:  04/12/25    Expected End:  04/26/25            Pt will complete LB dressing with SBA. (Progressing)       Start:  04/12/25    Expected End:  04/26/25

## 2025-04-12 NOTE — CARE PLAN
Problem: Pain - Adult  Goal: Verbalizes/displays adequate comfort level or baseline comfort level  Outcome: Progressing     Problem: Safety - Adult  Goal: Free from fall injury  Outcome: Progressing     Problem: Discharge Planning  Goal: Discharge to home or other facility with appropriate resources  Outcome: Progressing     Problem: Chronic Conditions and Co-morbidities  Goal: Patient's chronic conditions and co-morbidity symptoms are monitored and maintained or improved  Outcome: Progressing     Problem: Nutrition  Goal: Nutrient intake appropriate for maintaining nutritional needs  Outcome: Progressing     Problem: Skin  Goal: Decreased wound size/increased tissue granulation at next dressing change  Outcome: Progressing  Goal: Participates in plan/prevention/treatment measures  Outcome: Progressing  Goal: Prevent/manage excess moisture  Outcome: Progressing  Goal: Prevent/minimize sheer/friction injuries  Outcome: Progressing  Goal: Promote/optimize nutrition  Outcome: Progressing  Goal: Promote skin healing  Outcome: Progressing     Problem: Safety - Medical Restraint  Goal: Remains free of injury from restraints (Restraint for Interference with Medical Device)  Outcome: Progressing  Goal: Free from restraint(s) (Restraint for Interference with Medical Device)  Outcome: Progressing   The patient's goals for the shift include  sleep    The clinical goals for the shift include safety

## 2025-04-12 NOTE — PROGRESS NOTES
"Jw Salinas is a 70 y.o. male on day 2 of admission presenting with NSTEMI (non-ST elevated myocardial infarction) (Multi).    Subjective   Patient seen and examined.  He was laying in bed, denies any chest pain, shortness of breath, nausea, vomiting.       Objective     Physical Exam  Constitutional:       Appearance: He is toxic-appearing.   HENT:      Head: Normocephalic.   Cardiovascular:      Rate and Rhythm: Normal rate and regular rhythm.   Pulmonary:      Effort: Pulmonary effort is normal.   Abdominal:      Palpations: Abdomen is soft.      Tenderness: There is no abdominal tenderness.   Musculoskeletal:      Cervical back: No rigidity.   Neurological:      Mental Status: He is alert. Mental status is at baseline.         Last Recorded Vitals  Blood pressure 117/71, pulse 79, temperature 36.6 °C (97.9 °F), resp. rate 20, height 1.778 m (5' 10\"), weight 64.2 kg (141 lb 8.6 oz), SpO2 96%.  Intake/Output last 3 Shifts:  I/O last 3 completed shifts:  In: 1116.7 (17.4 mL/kg) [P.O.:120; I.V.:996.7 (15.5 mL/kg)]  Out: 3750 (58.4 mL/kg) [Urine:3750 (1.6 mL/kg/hr)]  Weight: 64.2 kg     Relevant Results            This patient currently has cardiac telemetry ordered; if you would like to modify or discontinue the telemetry order, click here to go to the orders activity to modify/discontinue the order.            Malnutrition Diagnosis Status: New  Malnutrition Diagnosis: Severe malnutrition related to chronic disease or condition  Related to: hx dementia  As Evidenced by: severe muscle wasting, severe fat loss  I agree with the dietitian's malnutrition diagnosis.      Assessment/Plan   Assessment & Plan  NSTEMI (non-ST elevated myocardial infarction) (Multi)    70-year-old male with past medical history of dementia who was previously recommended institutionalization, diabetes, hypertension presented with chest pain was admitted with a diagnosis of acute NSTEMI    Underwent coronary angiogram with successful " insertion of 2 SARAH to LAD  He was medically cleared for discharge yesterday  Patient was having swallowing issues and issues with balance  Failed his MBS was recommended a PEG tube  I did have multiple discussions with the son and he said that his father would not want a PEG tube  He also told me the same was told 2 years ago and his dad did well since then  Patient has been coughing/aspirating in the hospital  At this point  no plan on putting in a PEG tube  We will resume diet if son wishes  Patient is also having balance issues PT OT recommended placement  Working on getting him to a SNF  Continue rest of the medical management he is on including statin and dual antiplatelet therapy  We will get an MRI of the brain to rule out any acute causes of balance issues/dysphagia  DVT prophylaxis             Andre Hidalgo MD

## 2025-04-12 NOTE — CARE PLAN
The clinical goals for the shift include safety      Problem: Skin  Goal: Decreased wound size/increased tissue granulation at next dressing change  Outcome: Progressing  Flowsheets (Taken 4/12/2025 1852)  Decreased wound size/increased tissue granulation at next dressing change: Promote sleep for wound healing  Goal: Participates in plan/prevention/treatment measures  Outcome: Progressing  Flowsheets (Taken 4/12/2025 1852)  Participates in plan/prevention/treatment measures: Increase activity/out of bed for meals  Goal: Prevent/manage excess moisture  Outcome: Progressing  Flowsheets (Taken 4/12/2025 1852)  Prevent/manage excess moisture: Moisturize dry skin  Goal: Prevent/minimize sheer/friction injuries  Outcome: Progressing  Flowsheets (Taken 4/12/2025 1852)  Prevent/minimize sheer/friction injuries: Turn/reposition every 2 hours/use positioning/transfer devices  Goal: Promote/optimize nutrition  Outcome: Progressing  Flowsheets (Taken 4/12/2025 1852)  Promote/optimize nutrition: Discuss with provider if NPO > 2 days  Goal: Promote skin healing  Outcome: Progressing  Flowsheets (Taken 4/12/2025 1852)  Promote skin healing: Turn/reposition every 2 hours/use positioning/transfer devices

## 2025-04-12 NOTE — PROGRESS NOTES
Spoke with son russell to discuss discharge planning snf vs. Homecare.  Son states he is coming in this morning to speak with pt. And will get back with me.     Update:  spoke with son, he is requesting 1. [facility] 2. [facility] 3. [facility] [place].  Referral sent to all, awaiting response.  Pt. With [employer], will require ins. Precert.    Update:  [facility] able [facility] accept pt.  Hospital ins. Team to obtain precert. Spoke with son to provide update.

## 2025-04-12 NOTE — PROGRESS NOTES
Physical Therapy    Physical Therapy    Physical Therapy Evaluation    Patient Name: Jw Salinas  MRN: [id number]  Today's Date: 4/12/2025   Time Calculation  Start Time: 0742  Stop Time: 0755  Time Calculation (min): 13 min  806/806-A    Assessment/Plan   PT Assessment  PT Assessment Results: Decreased strength, Decreased endurance, Impaired balance, Decreased mobility, Decreased safety awareness  Rehab Prognosis: Good  Barriers to Discharge Home:  (Lives alone, Has to negotiate 13 steps. Poor balance)  Evaluation/Treatment Tolerance: Patient limited by fatigue  End of Session Communication: Physician  Assessment Comment: patient will benefit from further PT to increase strength, balance, coordination. Instruct in use of ww  End of Session Patient Position: Bed, 3 rail up, Alarm on  IP OR SWING BED PT PLAN  Inpatient or Swing Bed: Inpatient  PT Plan  Treatment/Interventions: Bed mobility, Transfer training, Gait training, Balance training, Stair training, Therapeutic exercise, Therapeutic activity  PT Plan: Ongoing PT  PT Frequency: 3 times per week  PT Discharge Recommendations: Moderate intensity level of continued care  Equipment Recommended upon Discharge: Wheeled walker  PT Recommended Transfer Status: Assist x1  PT - OK to Discharge:  (once deemed medically appropriate with continued PT and 24/7 care)    Subjective       General Visit Information:  General  Reason for Referral: Impaired mobility  Referred By: PT/OT 4/11/25 Rocky  Past Medical History Relevant to Rehab: HTN, HLD, Dementia, DM,  Co-Treatment: OT  Co-Treatment Reason: to maximize patient/staff safety  Patient Position Received: Bed, 4 rail up, Alarm on  General Comment: To ED 4/10/25 for syncope and collapse. EMS called to home earlier in day but melissa refused transport. Had a 2nd episode this time + LOC and hit head. Agreed to transport. NSTEMI.    Home Living:  Home Living  Home Living Comments: Per patient report resides alone in duplex  on upper level Has to navigate 13 steps with handrail. Tub shower no seat no grab bar. Per EMR, son has cameras set up in apartment to monitor patient/    Prior Level of Function:  Prior Function Per Pt/Caregiver Report  Prior Function Comments: Per patient report independent in mobiltiy, ADLs and IADLs without assistive device. Denies nay falls. Does not drive. Son transports    Precautions:  Precautions  Medical Precautions: Fall precautions    Vital Signs:     Objective     Pain:  Pain Assessment  0-10 (Numeric) Pain Score: 0 - No pain    Cognition:  Cognition  Overall Cognitive Status:  (Lethargic)  Orientation Level:  (Oriented to person)  Following Commands:  (Follows simple commands 75% of time. extra time to process)  Insight:  (Limtied insight into current abiliities and safety)  Processing Speed: Delayed    General Assessments:  General Observation  General Observation: Patient lying in bed. Agreeable to PT/OT Male external catheter in place. Unplugged for ambulation trial.   Activity Tolerance  Endurance:  (Fair)              Postural Control  Righting Reactions: Patient requires vc/tc to assist in righting to midline from posterior LOB  Static Sitting Balance: Fair +  Dynamic Sitting Balance Fair -  Static Standing Balance Poor  Dynamic Standing Balance Absent without assistive device    Functional Assessments:     Bed Mobility  Bed Mobility:  (Supine > sit with HOB 20 degree, use of handrail CGA, Patient tends to lose balance posteriorly. Sit > supine supervised)  Transfers  Transfer:  (Sit > stand at bed level + 2 moderate assist, Difficulty initiating stand. Tends to push posteriorly. Adopts WBOS and leans heavily into bed. Able to attain Fair stand balance with support of ww. Stand > sit vc to reach back + 2 minimal assist)  Ambulation/Gait Training  Ambulation/Gait Training Performed:  (Patient provided ww for support Initially able to complete march in place Patient ambulated 2' forward with slow,  guarded gait with continued retropulsive tendency. Then asked patient to ambulate backwards wtih increased tendency to lose balance)          Extremity/Trunk Assessments:  RUE   RUE : Within Functional Limits  LUE   LUE: Within Functional Limits  RLE   RLE :  (AROM Hip/knee/ankle WFL MMT 4/5 grossly)  LLE   LLE :  (AROM Hip/knee/ankle WFL MMT 3+/5 grossly)    Outcome Measures:     [facility] Basic Mobility  Turning from your back to your side while in a flat bed without using bedrails: A little  Moving from lying on your back to sitting on the side of a flat bed without using bedrails: A little  Moving to and from bed to chair (including a wheelchair): A lot  Standing up from a chair using your arms (e.g. wheelchair or bedside chair): A lot  To walk in hospital room: A lot  Climbing 3-5 steps with railing: Total  Basic Mobility - Total Score: 13           Goals:  Encounter Problems       Encounter Problems (Active)       PT Problem       Bed mobility supine <> sit independent  (Progressing)       Start:  04/12/25    Expected End:  04/26/25            Transfers sit <> stand with ww SBA (Progressing)       Start:  04/12/25    Expected End:  04/26/25            Patient to ambulate with ww 30' SBA (Progressing)       Start:  04/12/25    Expected End:  04/26/25            Patient to negotiate 15 steps with handrail cGA (Not Progressing)       Start:  04/12/25    Expected End:  04/26/25            Patient to stand with ww > 1 minute without LOB  (Not Progressing)       Start:  04/12/25    Expected End:  04/26/25                 Education Documentation  Mobility Training, taught by Zahraa Silva, PT at 4/12/2025 10:38 AM.  Learner: Patient  Readiness: Acceptance  Method: Explanation, Demonstration  Response: Needs Reinforcement  Comment: see note

## 2025-04-12 NOTE — CARE PLAN
Problem: Pain - Adult  Goal: Verbalizes/displays adequate comfort level or baseline comfort level  Outcome: Progressing     Problem: Safety - Adult  Goal: Free from fall injury  Outcome: Progressing     Problem: Discharge Planning  Goal: Discharge to home or other facility with appropriate resources  Outcome: Progressing     Problem: Chronic Conditions and Co-morbidities  Goal: Patient's chronic conditions and co-morbidity symptoms are monitored and maintained or improved  Outcome: Progressing     Problem: Nutrition  Goal: Nutrient intake appropriate for maintaining nutritional needs  Outcome: Progressing     Problem: Skin  Goal: Decreased wound size/increased tissue granulation at next dressing change  4/11/2025 2312 by Fauzia Gil RN  Flowsheets (Taken 4/11/2025 2312)  Decreased wound size/increased tissue granulation at next dressing change: Promote sleep for wound healing  4/11/2025 2312 by Fauzia Gil RN  Outcome: Progressing  Flowsheets (Taken 4/11/2025 2312)  Decreased wound size/increased tissue granulation at next dressing change: Promote sleep for wound healing  Goal: Participates in plan/prevention/treatment measures  4/11/2025 2312 by Fauzia Gil RN  Flowsheets (Taken 4/11/2025 2312)  Participates in plan/prevention/treatment measures:   Elevate heels   Discuss with provider PT/OT consult  4/11/2025 2312 by Fauzia Gil RN  Outcome: Progressing  Flowsheets (Taken 4/11/2025 2312)  Participates in plan/prevention/treatment measures:   Elevate heels   Discuss with provider PT/OT consult  Goal: Prevent/manage excess moisture  4/11/2025 2312 by Fauzia Gil RN  Flowsheets (Taken 4/11/2025 2312)  Prevent/manage excess moisture: Moisturize dry skin  4/11/2025 2312 by Fauzia Gil RN  Outcome: Progressing  Flowsheets (Taken 4/11/2025 2312)  Prevent/manage excess moisture: Moisturize dry skin  Goal: Prevent/minimize sheer/friction injuries  4/11/2025 2312 by Fauzia Gil  RN  Flowsheets (Taken 4/11/2025 2312)  Prevent/minimize sheer/friction injuries: Turn/reposition every 2 hours/use positioning/transfer devices  4/11/2025 2312 by Fauzia Gil RN  Outcome: Progressing  Flowsheets (Taken 4/11/2025 2312)  Prevent/minimize sheer/friction injuries: Turn/reposition every 2 hours/use positioning/transfer devices  Goal: Promote/optimize nutrition  4/11/2025 2312 by Fauzia Gil RN  Flowsheets (Taken 4/11/2025 2312)  Promote/optimize nutrition: Monitor/record intake including meals  4/11/2025 2312 by Fauzia Gil RN  Outcome: Progressing  Flowsheets (Taken 4/11/2025 2312)  Promote/optimize nutrition: Monitor/record intake including meals  Goal: Promote skin healing  4/11/2025 2312 by Fauzia Gil RN  Flowsheets (Taken 4/11/2025 2312)  Promote skin healing:   Assess skin/pad under line(s)/device(s)   Protective dressings over bony prominences   Turn/reposition every 2 hours/use positioning/transfer devices  4/11/2025 2312 by Fauzia Gil RN  Outcome: Progressing  Flowsheets (Taken 4/11/2025 2312)  Promote skin healing:   Assess skin/pad under line(s)/device(s)   Protective dressings over bony prominences   Turn/reposition every 2 hours/use positioning/transfer devices     Problem: Safety - Medical Restraint  Goal: Remains free of injury from restraints (Restraint for Interference with Medical Device)  Outcome: Progressing  Goal: Free from restraint(s) (Restraint for Interference with Medical Device)  Outcome: Progressing

## 2025-04-13 VITALS
WEIGHT: 141.54 LBS | SYSTOLIC BLOOD PRESSURE: 134 MMHG | HEART RATE: 70 BPM | HEIGHT: 70 IN | OXYGEN SATURATION: 97 % | RESPIRATION RATE: 18 BRPM | TEMPERATURE: 98.1 F | DIASTOLIC BLOOD PRESSURE: 70 MMHG | BODY MASS INDEX: 20.26 KG/M2

## 2025-04-13 LAB
BACTERIA BLD CULT: NORMAL
BACTERIA BLD CULT: NORMAL
GLUCOSE BLD MANUAL STRIP-MCNC: 112 MG/DL (ref 74–99)
GLUCOSE BLD MANUAL STRIP-MCNC: 122 MG/DL (ref 74–99)
GLUCOSE BLD MANUAL STRIP-MCNC: 160 MG/DL (ref 74–99)
GLUCOSE BLD MANUAL STRIP-MCNC: 239 MG/DL (ref 74–99)
GLUCOSE BLD MANUAL STRIP-MCNC: 45 MG/DL (ref 74–99)
GLUCOSE BLD MANUAL STRIP-MCNC: 93 MG/DL (ref 74–99)
GLUCOSE BLD MANUAL STRIP-MCNC: 94 MG/DL (ref 74–99)
GLUCOSE BLD MANUAL STRIP-MCNC: 96 MG/DL (ref 74–99)

## 2025-04-13 PROCEDURE — 99232 SBSQ HOSP IP/OBS MODERATE 35: CPT | Performed by: STUDENT IN AN ORGANIZED HEALTH CARE EDUCATION/TRAINING PROGRAM

## 2025-04-13 PROCEDURE — 2500000004 HC RX 250 GENERAL PHARMACY W/ HCPCS (ALT 636 FOR OP/ED): Performed by: STUDENT IN AN ORGANIZED HEALTH CARE EDUCATION/TRAINING PROGRAM

## 2025-04-13 PROCEDURE — 82947 ASSAY GLUCOSE BLOOD QUANT: CPT

## 2025-04-13 PROCEDURE — 2500000002 HC RX 250 W HCPCS SELF ADMINISTERED DRUGS (ALT 637 FOR MEDICARE OP, ALT 636 FOR OP/ED): Performed by: NURSE PRACTITIONER

## 2025-04-13 PROCEDURE — 2500000001 HC RX 250 WO HCPCS SELF ADMINISTERED DRUGS (ALT 637 FOR MEDICARE OP): Performed by: STUDENT IN AN ORGANIZED HEALTH CARE EDUCATION/TRAINING PROGRAM

## 2025-04-13 PROCEDURE — 2500000005 HC RX 250 GENERAL PHARMACY W/O HCPCS: Performed by: NURSE PRACTITIONER

## 2025-04-13 PROCEDURE — 1200000002 HC GENERAL ROOM WITH TELEMETRY DAILY

## 2025-04-13 PROCEDURE — 2500000001 HC RX 250 WO HCPCS SELF ADMINISTERED DRUGS (ALT 637 FOR MEDICARE OP): Performed by: INTERNAL MEDICINE

## 2025-04-13 RX ADMIN — ENOXAPARIN SODIUM 40 MG: 40 INJECTION SUBCUTANEOUS at 09:32

## 2025-04-13 RX ADMIN — ASPIRIN 81 MG: 81 TABLET, CHEWABLE ORAL at 09:32

## 2025-04-13 RX ADMIN — GABAPENTIN 300 MG: 300 CAPSULE ORAL at 09:32

## 2025-04-13 RX ADMIN — CLOPIDOGREL BISULFATE 75 MG: 75 TABLET, FILM COATED ORAL at 09:32

## 2025-04-13 RX ADMIN — ATORVASTATIN CALCIUM 40 MG: 20 TABLET, FILM COATED ORAL at 22:07

## 2025-04-13 RX ADMIN — GABAPENTIN 300 MG: 300 CAPSULE ORAL at 22:07

## 2025-04-13 RX ADMIN — INSULIN GLARGINE 20 UNITS: 100 INJECTION, SOLUTION SUBCUTANEOUS at 22:06

## 2025-04-13 RX ADMIN — DEXTROSE MONOHYDRATE 25 G: 25 INJECTION, SOLUTION INTRAVENOUS at 03:06

## 2025-04-13 ASSESSMENT — COGNITIVE AND FUNCTIONAL STATUS - GENERAL
DRESSING REGULAR LOWER BODY CLOTHING: A LITTLE
PERSONAL GROOMING: A LITTLE
HELP NEEDED FOR BATHING: A LITTLE
STANDING UP FROM CHAIR USING ARMS: A LOT
PERSONAL GROOMING: A LITTLE
WALKING IN HOSPITAL ROOM: A LOT
MOBILITY SCORE: 16
CLIMB 3 TO 5 STEPS WITH RAILING: A LOT
WALKING IN HOSPITAL ROOM: A LOT
HELP NEEDED FOR BATHING: A LITTLE
CLIMB 3 TO 5 STEPS WITH RAILING: A LOT
DAILY ACTIVITIY SCORE: 18
DRESSING REGULAR UPPER BODY CLOTHING: A LITTLE
MOVING TO AND FROM BED TO CHAIR: A LOT
STANDING UP FROM CHAIR USING ARMS: A LITTLE
TOILETING: A LITTLE
TOILETING: A LITTLE
DRESSING REGULAR UPPER BODY CLOTHING: A LITTLE
DRESSING REGULAR LOWER BODY CLOTHING: A LITTLE
EATING MEALS: A LITTLE
EATING MEALS: A LITTLE
MOBILITY SCORE: 18
DAILY ACTIVITIY SCORE: 18
MOVING TO AND FROM BED TO CHAIR: A LITTLE

## 2025-04-13 ASSESSMENT — PAIN - FUNCTIONAL ASSESSMENT: PAIN_FUNCTIONAL_ASSESSMENT: 0-10

## 2025-04-13 ASSESSMENT — PAIN SCALES - GENERAL
PAINLEVEL_OUTOF10: 0 - NO PAIN
PAINLEVEL_OUTOF10: 0 - NO PAIN

## 2025-04-13 NOTE — CARE PLAN
Problem: Skin  Goal: Decreased wound size/increased tissue granulation at next dressing change  Outcome: Progressing  Flowsheets (Taken 4/13/2025 1151)  Decreased wound size/increased tissue granulation at next dressing change: Promote sleep for wound healing  Goal: Participates in plan/prevention/treatment measures  Outcome: Progressing  Flowsheets (Taken 4/13/2025 1151)  Participates in plan/prevention/treatment measures: Increase activity/out of bed for meals  Goal: Prevent/manage excess moisture  Outcome: Progressing  Flowsheets (Taken 4/13/2025 1151)  Prevent/manage excess moisture: Cleanse incontinence/protect with barrier cream  Goal: Prevent/minimize sheer/friction injuries  Outcome: Progressing  Flowsheets (Taken 4/13/2025 1151)  Prevent/minimize sheer/friction injuries: Increase activity/out of bed for meals  Goal: Promote/optimize nutrition  Outcome: Progressing  Flowsheets (Taken 4/13/2025 1151)  Promote/optimize nutrition: Discuss with provider if NPO > 2 days  Goal: Promote skin healing  Outcome: Progressing  Flowsheets (Taken 4/13/2025 1151)  Promote skin healing: Protective dressings over bony prominences     The clinical goals for the shift include Patient will remain hemodynamically stable through end of shift

## 2025-04-13 NOTE — CARE PLAN
The patient's goals for the shift include      The clinical goals for the shift include Patient will remain hemodynamically stable through end of shift      Problem: Chronic Conditions and Co-morbidities  Goal: Patient's chronic conditions and co-morbidity symptoms are monitored and maintained or improved  Outcome: Progressing     Problem: Nutrition  Goal: Nutrient intake appropriate for maintaining nutritional needs  Outcome: Progressing     Problem: Skin  Goal: Decreased wound size/increased tissue granulation at next dressing change  Outcome: Progressing  Flowsheets (Taken 4/13/2025 0151)  Decreased wound size/increased tissue granulation at next dressing change: Promote sleep for wound healing  Goal: Participates in plan/prevention/treatment measures  Outcome: Progressing  Flowsheets (Taken 4/13/2025 0151)  Participates in plan/prevention/treatment measures:   Increase activity/out of bed for meals   Elevate heels  Goal: Prevent/manage excess moisture  Outcome: Progressing  Flowsheets (Taken 4/13/2025 0151)  Prevent/manage excess moisture:   Cleanse incontinence/protect with barrier cream   Moisturize dry skin  Goal: Prevent/minimize sheer/friction injuries  Outcome: Progressing  Flowsheets (Taken 4/13/2025 0151)  Prevent/minimize sheer/friction injuries:   Use pull sheet   Increase activity/out of bed for meals  Goal: Promote/optimize nutrition  Outcome: Progressing  Flowsheets (Taken 4/13/2025 0151)  Promote/optimize nutrition: Discuss with provider if NPO > 2 days  Goal: Promote skin healing  Outcome: Progressing  Flowsheets (Taken 4/13/2025 0151)  Promote skin healing: Assess skin/pad under line(s)/device(s)

## 2025-04-13 NOTE — PROGRESS NOTES
"Jw Salinas is a 70 y.o. male on day 3 of admission presenting with NSTEMI (non-ST elevated myocardial infarction) (Multi).    Subjective   Patient seen and examined.  He was laying in bed, denies any chest pain, shortness of breath, nausea, vomiting. Says that he is hungry and wants to eat.       Objective     Physical Exam  Constitutional:       Comments: Oriented to place and time, intermittently gets confused and agitated   HENT:      Head: Normocephalic.   Cardiovascular:      Rate and Rhythm: Normal rate and regular rhythm.   Pulmonary:      Effort: Pulmonary effort is normal. No respiratory distress.   Abdominal:      General: There is no distension.      Palpations: Abdomen is soft.   Musculoskeletal:         General: No deformity.   Neurological:      General: No focal deficit present.      Mental Status: He is alert.   Last Recorded Vitals  Blood pressure 98/54, pulse 92, temperature 36.7 °C (98.1 °F), temperature source Temporal, resp. rate 16, height 1.778 m (5' 10\"), weight 64.2 kg (141 lb 8.6 oz), SpO2 97%.  Intake/Output last 3 Shifts:  I/O last 3 completed shifts:  In: 1201.7 (18.7 mL/kg) [I.V.:1201.7 (18.7 mL/kg)]  Out: 2050 (31.9 mL/kg) [Urine:2050 (0.9 mL/kg/hr)]  Weight: 64.2 kg     Relevant Results            This patient currently has cardiac telemetry ordered; if you would like to modify or discontinue the telemetry order, click here to go to the orders activity to modify/discontinue the order.             Malnutrition Diagnosis Status: New  Malnutrition Diagnosis: Severe malnutrition related to chronic disease or condition  Related to: hx dementia  As Evidenced by: severe muscle wasting, severe fat loss  I agree with the dietitian's malnutrition diagnosis.      Assessment/Plan   Assessment & Plan    70-year-old male with past medical history of dementia who was previously recommended institutionalization, diabetes, hypertension presented with chest pain was admitted with a diagnosis of acute " NSTEMI    Underwent coronary angiogram with successful insertion of 2 SARAH to LAD  He was medically cleared for discharge   Patient was having swallowing issues and issues with balance  Failed his MBS was recommended a PEG tube  I did have multiple discussions with the son and he said that his father would not want a PEG tube  He also told me the same was told 2 years ago and his father did well since then  Patient has been coughing/aspirating in the hospital  At this point  no plan on putting in a PEG tube  Son has not made up his mind yet, patient has been n.p.o. for more than 48 hours  Patient is also having balance issues PT OT recommended placement  Working on getting him to a SNF  Continue rest of the medical management he is on including statin and dual antiplatelet therapy  We did get an MRI of the brain which did not show any acute stroke or infarct did show findings consistent with NPH, will consult neurology  DVT prophylaxis             Andre Hidalgo MD

## 2025-04-14 ENCOUNTER — APPOINTMENT (OUTPATIENT)
Dept: NEUROLOGY | Facility: HOSPITAL | Age: 70
DRG: 280 | End: 2025-04-14
Payer: MEDICARE

## 2025-04-14 LAB
AMMONIA PLAS-SCNC: 38 UMOL/L (ref 16–53)
ANION GAP SERPL CALC-SCNC: 7 MMOL/L (ref 10–20)
BACTERIA BLD CULT: NORMAL
BACTERIA BLD CULT: NORMAL
BUN SERPL-MCNC: 26 MG/DL (ref 6–23)
CALCIUM SERPL-MCNC: 8.5 MG/DL (ref 8.6–10.3)
CHLORIDE SERPL-SCNC: 100 MMOL/L (ref 98–107)
CO2 SERPL-SCNC: 32 MMOL/L (ref 21–32)
CREAT SERPL-MCNC: 0.82 MG/DL (ref 0.5–1.3)
EGFRCR SERPLBLD CKD-EPI 2021: >90 ML/MIN/1.73M*2
ERYTHROCYTE [DISTWIDTH] IN BLOOD BY AUTOMATED COUNT: 12.3 % (ref 11.5–14.5)
FOLATE SERPL-MCNC: 8.4 NG/ML
GLUCOSE BLD MANUAL STRIP-MCNC: 111 MG/DL (ref 74–99)
GLUCOSE BLD MANUAL STRIP-MCNC: 211 MG/DL (ref 74–99)
GLUCOSE BLD MANUAL STRIP-MCNC: 259 MG/DL (ref 74–99)
GLUCOSE BLD MANUAL STRIP-MCNC: 275 MG/DL (ref 74–99)
GLUCOSE BLD MANUAL STRIP-MCNC: 306 MG/DL (ref 74–99)
GLUCOSE BLD MANUAL STRIP-MCNC: 354 MG/DL (ref 74–99)
GLUCOSE BLD MANUAL STRIP-MCNC: 393 MG/DL (ref 74–99)
GLUCOSE BLD MANUAL STRIP-MCNC: 46 MG/DL (ref 74–99)
GLUCOSE SERPL-MCNC: 50 MG/DL (ref 74–99)
HCT VFR BLD AUTO: 37.9 % (ref 41–52)
HGB BLD-MCNC: 12.9 G/DL (ref 13.5–17.5)
MCH RBC QN AUTO: 28.8 PG (ref 26–34)
MCHC RBC AUTO-ENTMCNC: 34 G/DL (ref 32–36)
MCV RBC AUTO: 85 FL (ref 80–100)
NRBC BLD-RTO: 0 /100 WBCS (ref 0–0)
PLATELET # BLD AUTO: 182 X10*3/UL (ref 150–450)
POTASSIUM SERPL-SCNC: 2.9 MMOL/L (ref 3.5–5.3)
RBC # BLD AUTO: 4.48 X10*6/UL (ref 4.5–5.9)
SODIUM SERPL-SCNC: 136 MMOL/L (ref 136–145)
TSH SERPL-ACNC: 2.36 MIU/L (ref 0.44–3.98)
VIT B12 SERPL-MCNC: 521 PG/ML (ref 211–911)
WBC # BLD AUTO: 7.2 X10*3/UL (ref 4.4–11.3)

## 2025-04-14 PROCEDURE — 82140 ASSAY OF AMMONIA: CPT

## 2025-04-14 PROCEDURE — 97535 SELF CARE MNGMENT TRAINING: CPT | Mod: GO

## 2025-04-14 PROCEDURE — 95816 EEG AWAKE AND DROWSY: CPT | Performed by: PSYCHIATRY & NEUROLOGY

## 2025-04-14 PROCEDURE — 97116 GAIT TRAINING THERAPY: CPT | Mod: GP,CQ

## 2025-04-14 PROCEDURE — 36415 COLL VENOUS BLD VENIPUNCTURE: CPT

## 2025-04-14 PROCEDURE — 97530 THERAPEUTIC ACTIVITIES: CPT | Mod: GP,CQ

## 2025-04-14 PROCEDURE — 82947 ASSAY GLUCOSE BLOOD QUANT: CPT

## 2025-04-14 PROCEDURE — 1200000002 HC GENERAL ROOM WITH TELEMETRY DAILY

## 2025-04-14 PROCEDURE — 36415 COLL VENOUS BLD VENIPUNCTURE: CPT | Performed by: STUDENT IN AN ORGANIZED HEALTH CARE EDUCATION/TRAINING PROGRAM

## 2025-04-14 PROCEDURE — 85027 COMPLETE CBC AUTOMATED: CPT | Performed by: STUDENT IN AN ORGANIZED HEALTH CARE EDUCATION/TRAINING PROGRAM

## 2025-04-14 PROCEDURE — 95816 EEG AWAKE AND DROWSY: CPT

## 2025-04-14 PROCEDURE — 99232 SBSQ HOSP IP/OBS MODERATE 35: CPT | Performed by: STUDENT IN AN ORGANIZED HEALTH CARE EDUCATION/TRAINING PROGRAM

## 2025-04-14 PROCEDURE — 84443 ASSAY THYROID STIM HORMONE: CPT

## 2025-04-14 PROCEDURE — 2500000001 HC RX 250 WO HCPCS SELF ADMINISTERED DRUGS (ALT 637 FOR MEDICARE OP): Performed by: INTERNAL MEDICINE

## 2025-04-14 PROCEDURE — 2500000002 HC RX 250 W HCPCS SELF ADMINISTERED DRUGS (ALT 637 FOR MEDICARE OP, ALT 636 FOR OP/ED): Performed by: STUDENT IN AN ORGANIZED HEALTH CARE EDUCATION/TRAINING PROGRAM

## 2025-04-14 PROCEDURE — 82374 ASSAY BLOOD CARBON DIOXIDE: CPT | Performed by: STUDENT IN AN ORGANIZED HEALTH CARE EDUCATION/TRAINING PROGRAM

## 2025-04-14 PROCEDURE — 99223 1ST HOSP IP/OBS HIGH 75: CPT | Performed by: PSYCHIATRY & NEUROLOGY

## 2025-04-14 PROCEDURE — 82607 VITAMIN B-12: CPT | Mod: ELYLAB

## 2025-04-14 PROCEDURE — 2500000005 HC RX 250 GENERAL PHARMACY W/O HCPCS: Performed by: NURSE PRACTITIONER

## 2025-04-14 PROCEDURE — 92526 ORAL FUNCTION THERAPY: CPT | Mod: GN

## 2025-04-14 PROCEDURE — 2500000002 HC RX 250 W HCPCS SELF ADMINISTERED DRUGS (ALT 637 FOR MEDICARE OP, ALT 636 FOR OP/ED): Performed by: NURSE PRACTITIONER

## 2025-04-14 PROCEDURE — 2500000001 HC RX 250 WO HCPCS SELF ADMINISTERED DRUGS (ALT 637 FOR MEDICARE OP): Performed by: STUDENT IN AN ORGANIZED HEALTH CARE EDUCATION/TRAINING PROGRAM

## 2025-04-14 PROCEDURE — 82746 ASSAY OF FOLIC ACID SERUM: CPT | Mod: ELYLAB

## 2025-04-14 PROCEDURE — 2500000004 HC RX 250 GENERAL PHARMACY W/ HCPCS (ALT 636 FOR OP/ED): Performed by: STUDENT IN AN ORGANIZED HEALTH CARE EDUCATION/TRAINING PROGRAM

## 2025-04-14 RX ORDER — POTASSIUM CHLORIDE 1.5 G/1.58G
40 POWDER, FOR SOLUTION ORAL ONCE
Status: COMPLETED | OUTPATIENT
Start: 2025-04-14 | End: 2025-04-14

## 2025-04-14 RX ADMIN — GABAPENTIN 300 MG: 300 CAPSULE ORAL at 09:35

## 2025-04-14 RX ADMIN — ASPIRIN 81 MG: 81 TABLET, CHEWABLE ORAL at 09:35

## 2025-04-14 RX ADMIN — LEVOTHYROXINE SODIUM 150 MCG: 75 TABLET ORAL at 06:15

## 2025-04-14 RX ADMIN — ATORVASTATIN CALCIUM 40 MG: 20 TABLET, FILM COATED ORAL at 21:34

## 2025-04-14 RX ADMIN — DEXTROSE MONOHYDRATE 12.5 G: 25 INJECTION, SOLUTION INTRAVENOUS at 07:10

## 2025-04-14 RX ADMIN — INSULIN HUMAN 3 UNITS: 100 INJECTION, SOLUTION PARENTERAL at 16:56

## 2025-04-14 RX ADMIN — INSULIN GLARGINE 20 UNITS: 100 INJECTION, SOLUTION SUBCUTANEOUS at 21:38

## 2025-04-14 RX ADMIN — ENOXAPARIN SODIUM 40 MG: 40 INJECTION SUBCUTANEOUS at 09:35

## 2025-04-14 RX ADMIN — GABAPENTIN 300 MG: 300 CAPSULE ORAL at 21:34

## 2025-04-14 RX ADMIN — INSULIN HUMAN 4 UNITS: 100 INJECTION, SOLUTION PARENTERAL at 11:05

## 2025-04-14 RX ADMIN — POTASSIUM CHLORIDE 40 MEQ: 1.5 POWDER, FOR SOLUTION ORAL at 10:42

## 2025-04-14 RX ADMIN — CLOPIDOGREL BISULFATE 75 MG: 75 TABLET, FILM COATED ORAL at 09:35

## 2025-04-14 RX ADMIN — GABAPENTIN 300 MG: 300 CAPSULE ORAL at 16:56

## 2025-04-14 ASSESSMENT — COGNITIVE AND FUNCTIONAL STATUS - GENERAL
CLIMB 3 TO 5 STEPS WITH RAILING: A LOT
TOILETING: A LITTLE
DAILY ACTIVITIY SCORE: 18
MOVING TO AND FROM BED TO CHAIR: A LITTLE
DRESSING REGULAR LOWER BODY CLOTHING: A LITTLE
HELP NEEDED FOR BATHING: A LOT
MOVING FROM LYING ON BACK TO SITTING ON SIDE OF FLAT BED WITH BEDRAILS: A LITTLE
TURNING FROM BACK TO SIDE WHILE IN FLAT BAD: A LITTLE
MOBILITY SCORE: 16
PERSONAL GROOMING: A LITTLE
WALKING IN HOSPITAL ROOM: A LOT
DRESSING REGULAR UPPER BODY CLOTHING: A LITTLE
STANDING UP FROM CHAIR USING ARMS: A LITTLE

## 2025-04-14 ASSESSMENT — PAIN SCALES - GENERAL
PAINLEVEL_OUTOF10: 0 - NO PAIN
PAINLEVEL_OUTOF10: 0 - NO PAIN

## 2025-04-14 ASSESSMENT — ACTIVITIES OF DAILY LIVING (ADL): HOME_MANAGEMENT_TIME_ENTRY: 12

## 2025-04-14 ASSESSMENT — VISUAL ACUITY: METHOD_CF: 1

## 2025-04-14 ASSESSMENT — PAIN - FUNCTIONAL ASSESSMENT: PAIN_FUNCTIONAL_ASSESSMENT: 0-10

## 2025-04-14 NOTE — DOCUMENTATION CLARIFICATION NOTE
"    PATIENT:               PAULO ROJAS  ACCT #:                  [id number]  MRN:                       [id number]  :                       1955  ADMIT DATE:       4/10/2025 12:47 AM  DISCH DATE:  RESPONDING PROVIDER #:        [id number]          PROVIDER RESPONSE TEXT:    Non-infectious SIRS with acute multiorgan dysfunction of lactic acidosis and LILIA    CDI QUERY TEXT:    Clarification        Instruction:    Based on your assessment of the patient and the clinical information, please provide the requested documentation by clicking on the appropriate radio button and enter any additional information if prompted.    Question: Please further clarify if there is a diagnosis related to the clinical information    When answering this query, please exercise your independent professional judgment. The fact that a question is being asked, does not imply that any particular answer is desired or expected.    The patient's clinical indicators include:  Clinical Information: 70 yom admit with NSTEMI, acidosis, LILIA    Clinical Indicators:    4/10 H/P: \"NSTEMI  #Leukocytosis  #Lactic acidosis (improving)  -Suspect 2/2 NSTEMI, doubt sepsis  -Vanc, Zosyn given in ED, will not continue  LILIA  Avoid nephrotoxins, renal dosing\"    4/10- WBC: 20.5/15.4/7.2    4/10- VS: Pulse: 80-96, B/P 80//85, Temperature: 96.3-99.5, Respirations: 16-20    Treatment: 4/10 Sodium Chloride 0.9 percent 1500 total IVF bolus, 4/10 Lactated Ringer's 1000 ml IVF bolus, 4/10- Sodium Chloride 0.45 percent IVF continuous infusion at 75 ml/hr, Avoid nephrotoxins, renal dosing, 4/10 LHC, b/l coronary angio, PCI of mid LAD with DESx2    Risk Factors: NSTEMI  Options provided:  -- Non-infectious SIRS with acute metabolic organ dysfunction of lactic acidosis  -- Non-infectious SIRS with acute renal organ dysfunction of LILIA  -- Non-infectious SIRS with acute multiorgan dysfunction of lactic acidosis and LILIA  -- Other - I will add my own " diagnosis  -- Refer to Clinical Documentation Reviewer    Query created by: Lizbet Grant on 4/14/2025 12:57 PM      Electronically signed by:  KAILA RUSSELL MD 4/14/2025 2:49 PM

## 2025-04-14 NOTE — CARE PLAN
The patient's goals for the shift include Comfort    The clinical goals for the shift include Patient will remain free from fall/injury through end of shift      Problem: Chronic Conditions and Co-morbidities  Goal: Patient's chronic conditions and co-morbidity symptoms are monitored and maintained or improved  Outcome: Progressing     Problem: Nutrition  Goal: Nutrient intake appropriate for maintaining nutritional needs  Outcome: Progressing     Problem: Skin  Goal: Decreased wound size/increased tissue granulation at next dressing change  Outcome: Progressing  Goal: Participates in plan/prevention/treatment measures  Outcome: Progressing  Goal: Prevent/manage excess moisture  Outcome: Progressing  Goal: Prevent/minimize sheer/friction injuries  Outcome: Progressing  Goal: Promote/optimize nutrition  Outcome: Progressing  Goal: Promote skin healing  Outcome: Progressing

## 2025-04-14 NOTE — PROGRESS NOTES
Inpatient Speech Language Pathology Treatment Note     Patient Name: Jw Salinas  MRN: [id number]  : 1955  Patient Room: [address] [place], [place] [address]  Today's Date: 25  Time Calculation  Start Time: 0755  Stop Time: 0811  Time Calculation (min): 16 min         PLAN:  Skilled speech therapy for dysphagia treatment continues to be warranted to provide training and instruction regarding the use of compensatory swallow strategies, to complete oropharyngeal strengthening exercises, for pt/caregiver education in order to reduce risk of aspiration, dehydration and malnutrition. , to assess tolerance of diet , to determine ability to upgrade diet after PO trials with SLP  SLP TX Plan: Continue Plan of Care  SLP Frequency: 2x per week  Duration: 30 days  Discussed POC: Patient  Discussed Risks/Benefits: Patient  Patient/Caregiver Agreeable: Yes  Continue skilled SLP at next level of care: Yes Intensity of care recommend: moderate      Recommendations:   Solid Diet Recommendations: Soft & bite sized/chopped (IDDSI Level 6)  Liquid Diet Recommendations: Nectar thick/mildly thick (IDDS Level 2)  Compensatory strategies: small bites/sips, alternate bites/sips, upright 90 degrees for intake   Medication administration: whole with thick liquids or puree     SLP Assessment:  Pt appearing mildly improved since previous session. Per review pt and son have both declined use of PEG tube or corpak, wish to continue intake understanding the risks. Therfore pt was placed on soft-bite sized diet and nectar thick liquids as this had the least amount of aspiration during most recent MBSS. Pt reported he does not like the thick liquids but agreeable at this time.  Pt ate approx 90% of breakfast that was at bedside when SLP entered room.   Introduced to effortful swallows this session, required moderate cues to complete correctly. Able to complete 6 trials before unable to complete further trials.   Continue current diet and completion of exercises  to improve swallow.     Subjective:  Pt. Seen at bedside for skilled dysphagia treatment.   Prior to Session Communication: Bedside nurse  Pain:  Ratin-10   Pt report: 0  Action taken: none needed         Oxygen Status:   nasal cannula             Goals:   Established on- 25  Duration: 30 days   -Patient will tolerate current diet without noted pulmonary compromise or evidence of pulmonary sequela as noted in patient chart and/or reported by patient/family.- pt is likely aspirating current diet but no decline in respiratory status at this time  - Patient will independently implement safe swallowing strategies to reduce risk of aspiration with use of compensatory strategies during 90% of therapeutic trials. - Required SLP to state strategies, unable to recall   - Pt will complete effortful swallows 10 reps, 3 x a day for 7 days a week; to strengthen pharyngeal muscles for improve bolus clearance through the pharynx.  - introduced this date, required moderate cues, able to complete 6 trials   - Pt will complete isokinetic CTAR 3-5 seconds; 10 times, independent of cues, to improve hyolaryngeal elevation and UES opening; impacting airway protection and pharyngeal clearance during the swallow. - Not yet introduced   - Pt will compete jose maneuver 10 times, 3x a day, 7 days a week, independent of cues to improve posterior pharyngeal wall contraction/strength necessary for bolus clearance through the pharynx. - not yet introduced       Treatment Outcome:  SLP TX Intervention Outcome: Making Progress Towards Goals    Treatment Tolerance: Patient tolerated treatment well   Prognosis: Good   Barriers: Comorbidities, Cognition   Medical Staff Made Aware: Yes         SLP Inpatient Education:  Learner patient   Barriers to Learning Cognitive limitations   Method Verbal   Education - Topic ST provided patient education regarding role of ST, purpose of treatment, swallow strategies     Outcome    1= partially meets;  needs review

## 2025-04-14 NOTE — PROGRESS NOTES
"Jw Salinas is a 70 y.o. male on day 4 of admission presenting with NSTEMI (non-ST elevated myocardial infarction) (Multi).    Subjective   Patient seen and examined.  He is doing well, diet was resumed yesterday after multiple discussions with the son so far he is tolerating.     Objective     Physical Exam  Constitutional:       Comments: Oriented to place and time, intermittently gets confused and agitated   HENT:      Head: Normocephalic.   Cardiovascular:      Rate and Rhythm: Normal rate and regular rhythm.   Pulmonary:      Effort: Pulmonary effort is normal. No respiratory distress.   Abdominal:      General: There is no distension.      Palpations: Abdomen is soft.   Musculoskeletal:         General: No deformity.   Neurological:      General: No focal deficit present.      Mental Status: He is alert.   Last Recorded Vitals  Blood pressure 144/70, pulse 73, temperature 36.7 °C (98.1 °F), resp. rate 18, height 1.778 m (5' 10\"), weight 64.2 kg (141 lb 8.6 oz), SpO2 94%.  Intake/Output last 3 Shifts:  I/O last 3 completed shifts:  In: - (0 mL/kg)   Out: 1150 (17.9 mL/kg) [Urine:1150 (0.5 mL/kg/hr)]  Weight: 64.2 kg     Relevant Results            This patient currently has cardiac telemetry ordered; if you would like to modify or discontinue the telemetry order, click here to go to the orders activity to modify/discontinue the order.             Malnutrition Diagnosis Status: New  Malnutrition Diagnosis: Severe malnutrition related to chronic disease or condition  Related to: hx dementia  As Evidenced by: severe muscle wasting, severe fat loss  I agree with the dietitian's malnutrition diagnosis.      Assessment/Plan   Assessment & Plan    70-year-old male with past medical history of dementia who was previously recommended institutionalization, diabetes, hypertension presented with chest pain was admitted with a diagnosis of acute NSTEMI    Underwent coronary angiogram with successful insertion of 2 SARAH to " LAD  He was medically cleared for discharge   Patient was having swallowing issues and issues with balance  Failed his MBS was recommended a PEG tube  I did have multiple discussions with the son and he said that his father would not want a PEG tube  He also told me the same was told 2 years ago and his father did well since then  Patient has been coughing/aspirating in the hospital  At this point  no plan on putting in a PEG tube  He was restarted on diet yesterday, currently on soft, bite-size foods and level 2 thick liquids  Patient is also having balance issues PT OT recommended placement  Working on getting him to a SNF  Continue rest of the medical management he is on including statin and dual antiplatelet therapy  We did get an MRI of the brain which did not show any acute stroke or infarct did show findings consistent with NPH, neurology has been consulted   DVT prophylaxis             Andre Hidalgo MD

## 2025-04-14 NOTE — PROGRESS NOTES
Physical Therapy    Physical Therapy Treatment    Patient Name: Jw Salinas  MRN: [id number]  Department: [facility]  Room: [facility]-A  Today's Date: 4/14/2025  Time Calculation  Start Time: 1023  Stop Time: 1046  Time Calculation (min): 23 min         Assessment/Plan   PT Assessment  PT Assessment Results: Decreased strength, Decreased endurance, Impaired balance, Decreased mobility, Decreased safety awareness  Rehab Prognosis: Good  End of Session Communication: Bedside nurse  Assessment Comment: Pt continues to require assistance with all functional tasks. Pt demos less retropulsion but continues to be unsteady and impulsive. Mod cues for WW management. Pt would benefit from continued PT to further progress strength and functional mobility.  End of Session Patient Position: Up in chair, Alarm on  PT Plan  Inpatient/Swing Bed or Outpatient: Inpatient  PT Plan  Treatment/Interventions: Bed mobility, Transfer training, Gait training, Stair training  PT Plan: Ongoing PT  PT Frequency: 3 times per week  PT Discharge Recommendations: Moderate intensity level of continued care  Equipment Recommended upon Discharge: Wheeled walker  PT Recommended Transfer Status: Assist x1    General Visit Information:   PT  Visit  PT Received On: 04/14/25  General  Reason for Referral: NSTEMI, impaired mobility  Referred By: PT/OT 4/11/25 [place]  Past Medical History Relevant to Rehab: HTN, HLD, Dementia, DM  Prior to Session Communication: Bedside nurse  Patient Position Received: Bed, 3 rail up, Alarm on  General Comment: Pt is pleasant and cooperative, presents with a flat affect.    Subjective   Precautions:  Precautions  Medical Precautions: Fall precautions     Date/Time Vitals Session Patient Position Pulse Resp SpO2 BP MAP (mmHg)    04/14/25 10:48:51 --  --  74  18  98 %  118/59  83                 Objective   Pain:  Pain Assessment  0-10 (Numeric) Pain Score: 0 - No pain  Cognition:  Cognition  Orientation Level: Disoriented to  time, Disoriented to situation  Coordination:     Postural Control:     Extremity/Trunk Assessments:        Activity Tolerance:  Activity Tolerance  Endurance: Decreased tolerance for upright activites  Treatments:  Bed Mobility  Bed Mobility: Yes (Sup>sit at supervision level with HOB elevated and pt using bed rail to assist. Pt attempting to stand right from getting to EOB lacking safety awareness.)    Ambulation/Gait Training  Ambulation/Gait Training Performed: Yes (Pt ambulating 50' with WW/gait belt and min A/mod A. Pt demos a slow, step to gait, shuffling at times. Pt needing continuous verbal cues for WW proximity and management. No retropulsion noted this session but mildly unsteady. Pt can be impulsive.)  Transfers  Transfer: Yes (Sit<>stand transfer from EOB and chair with WW/gait belt with decreased push back against the bed and no retropulsion but increase cues for hand placement both verbal/tactile.)    Stairs  Stairs: Yes (Stair climbing x 3 steps with BL handrails and non reciprocal gait with mod A. Pt knees flex mildly throughout with minor R knee buckle x1 on descend.)    Outcome Measures:  [facility] Basic Mobility  Turning from your back to your side while in a flat bed without using bedrails: A little  Moving from lying on your back to sitting on the side of a flat bed without using bedrails: A little  Moving to and from bed to chair (including a wheelchair): A little  Standing up from a chair using your arms (e.g. wheelchair or bedside chair): A little  To walk in hospital room: A lot  Climbing 3-5 steps with railing: A lot  Basic Mobility - Total Score: 16    Education Documentation  Mobility Training, taught by Isha Patton PTA at 4/14/2025 11:09 AM.  Learner: Patient  Readiness: Acceptance  Method: Explanation  Response: Needs Reinforcement    Education Comments  No comments found.        EDUCATION:  Outpatient Education  Individual(s) Educated: Patient  Education Provided: Body Mechanics,  Fall Risk, Home Safety    Encounter Problems       Encounter Problems (Active)       PT Problem       Bed mobility supine <> sit independent  (Progressing)       Start:  04/12/25    Expected End:  04/26/25            Transfers sit <> stand with ww SBA (Progressing)       Start:  04/12/25    Expected End:  04/26/25            Patient to ambulate with ww 30' SBA (Progressing)       Start:  04/12/25    Expected End:  04/26/25            Patient to negotiate 15 steps with handrail cGA (Progressing)       Start:  04/12/25    Expected End:  04/26/25            Patient to stand with ww > 1 minute without LOB  (Progressing)       Start:  04/12/25    Expected End:  04/26/25

## 2025-04-14 NOTE — PROGRESS NOTES
Precert remains pending at this time.  Will continue to follow for dc needs.  Team has been updated.

## 2025-04-14 NOTE — CONSULTS
Inpatient consult to Neurology  Consult performed by: VALERIO Hess-CNP  Consult ordered by: Andre Hidalgo MD          History Of Present Illness  Jw Salinas is a 70 y.o. male presenting with MRI brain concerning for NPH. He was [place] [facility] [place] for reports of syncope and collapse while on toilet. The patient at that time had signed off and refused to be transferred to the ER. At around 0000 hrs. the patient had another witnessed syncopal events. The patient fell back hitting his head. There was a brief loss of conscious. Cardiology consulted and he was medically cleared to discharge home. While working with PT, it the patient presented with ataxic gait. MRI brain revealed no acute infarct/bleed/mass, but volume loss. Disproportionate ventriculomegaly could reflect more pronounced central volume loss but may also be seen with normal pressure hydrocephalus. PMH of vascular dementia, HTN, HLD, chronic pain on gabapentin, and poorly-controlled T1DM (per endocrine note 5/21/2024, type 1, previous records also show type 2)   Pt. Seen and examined. He states he is living at home with his son. He endorses some gait difficulties and memory issues. He denies any urinary incontinence. PT/OT to see and update me on progress today. There is a need for PEG for swallowing issues, but family is refusing per nursing. He is alert to person, place with some intermittent confusion at times. No focal neurological deficits on exam.    Review of systems are negative unless otherwise specified in HPI.   Past Medical History  History reviewed. No pertinent past medical history.  Surgical History  Past Surgical History:   Procedure Laterality Date    CARDIAC CATHETERIZATION N/A 4/10/2025    Procedure: Left Heart Cath;  Surgeon: Mike Chapman DO;  Location: [facility];  Service: Cardiovascular;  Laterality: N/A;    CARDIAC CATHETERIZATION N/A 4/10/2025    Procedure: PCI;  Surgeon: Mike Chapman DO;   Location: [facility];  Service: Cardiovascular;  Laterality: N/A;     Social History     Allergies  Patient has no known allergies.  Medications Prior to Admission   Medication Sig Dispense Refill Last Dose/Taking    atorvastatin (Lipitor) 20 mg tablet Take 1 tablet (20 mg) by mouth once daily.   Past Week    Basaglar KwikPen U-100 Insulin 100 unit/mL (3 mL) pen Inject 20 Units under the skin once daily at bedtime.   4/9/2025 Bedtime    insulin regular (HumuLIN R,NovoLIN R) 100 unit/mL injection Inject 10 Units under the skin 3 times a day before meals. Take as directed per insulin instructions.   4/9/2025 Bedtime    levothyroxine (Synthroid, Levoxyl) 150 mcg tablet Take 1 tablet (150 mcg) by mouth early in the morning.. Take on an empty stomach at the same time each day, either 30 to 60 minutes prior to breakfast   Past Week    lisinopril 10 mg tablet Take 1 tablet (10 mg) by mouth once daily.   Past Week       Review of Systems  Neurological Exam  Mental Status   Oriented only to person and place. Speech is normal. Language is fluent with no aphasia.    Cranial Nerves  CN II: Right visual acuity: Counts fingers. Left visual acuity: Counts fingers. Right normal visual field. Left normal visual field.  CN III, IV, VI: Extraocular movements intact bilaterally. No nystagmus.   Right pupil: 3 mm. Round. Reactive to light. Reactive to accommodation.   Left pupil: 3 mm. Round. Reactive to light. Reactive to accommodation.  CN V:  Right: Facial sensation is normal.  Left: Facial sensation is normal on the left.  CN VII:  Right: There is no facial weakness.  Left: There is no facial weakness.  CN VIII:  Right: Hearing is normal.  Left: Hearing is normal.  CN IX, X:  Right: Palate is normal.  Left: Palate is normal.  CN XI:  Right: Trapezius strength is normal.  Left: Trapezius strength is normal.  CN XII: Tongue midline without atrophy or fasciculations.    Motor  Normal muscle bulk throughout. Normal muscle  "tone. Strength is 5/5 throughout all four extremities.    Sensory  Light touch is normal in upper and lower extremities.     Reflexes  Deep tendon reflexes are 2+ and symmetric in all four extremities.    Physical Exam  HENT:      Right Ear: Hearing normal.      Left Ear: Hearing normal.   Eyes:      Extraocular Movements: EOM normal. No nystagmus.   Neurological:      Motor: Motor strength is normal.     Deep Tendon Reflexes: Reflexes are normal and symmetric.   Psychiatric:         Speech: Speech normal.         Cognition and Memory: Memory is impaired.         Last Recorded Vitals  Blood pressure 144/70, pulse 73, temperature 36.7 °C (98.1 °F), resp. rate 18, height 1.778 m (5' 10\"), weight 64.2 kg (141 lb 8.6 oz), SpO2 94%.    Relevant Results  Results for orders placed or performed during the hospital encounter of 04/10/25 (from the past 96 hours)   Heparin Assay   Result Value Ref Range    Heparin Unfractionated 0.4 See Comment Below for Therapeutic Ranges IU/mL   Transthoracic Echo (TTE) Complete   Result Value Ref Range    AV mn grad 4 mmHg    AV pk ramila 1.27 m/s    LV Biplane EF 62 %    LVOT diam 2.00 cm    MV E/A ratio 1.17     Tricuspid annular plane systolic excursion 1.8 cm    LA vol index A/L 22.4 ml/m2    LV EF 53 %    RV free wall pk S' 14.00 cm/s    RVSP 24.0 mmHg    LVIDd 4.00 cm    Aortic Valve Area by Continuity of Peak Velocity 2.72 cm2    AV pk grad 6 mmHg    Aortic Valve Area by Continuity of VTI 2.82 cm2    LV A4C EF 64.2    Urinalysis with Reflex Culture and Microscopic   Result Value Ref Range    Color, Urine Light-Yellow Light-Yellow, Yellow, Dark-Yellow    Appearance, Urine Turbid (N) Clear    Specific Gravity, Urine 1.013 1.005 - 1.035    pH, Urine 5.0 5.0, 5.5, 6.0, 6.5, 7.0, 7.5, 8.0    Protein, Urine NEGATIVE NEGATIVE, 10 (TRACE), 20 (TRACE) mg/dL    Glucose, Urine 300 (3+) (A) Normal mg/dL    Blood, Urine NEGATIVE NEGATIVE mg/dL    Ketones, Urine 10 (1+) (A) NEGATIVE mg/dL    " Bilirubin, Urine NEGATIVE NEGATIVE mg/dL    Urobilinogen, Urine Normal Normal mg/dL    Nitrite, Urine NEGATIVE NEGATIVE    Leukocyte Esterase, Urine NEGATIVE NEGATIVE   Extra Urine Gray Tube   Result Value Ref Range    Extra Tube Hold for add-ons.    Drug Screen, Urine   Result Value Ref Range    Amphetamine Screen, Urine Presumptive Negative Presumptive Negative    Barbiturate Screen, Urine Presumptive Negative Presumptive Negative    Benzodiazepines Screen, Urine Presumptive Negative Presumptive Negative    Cannabinoid Screen, Urine Presumptive Negative Presumptive Negative    Cocaine Metabolite Screen, Urine Presumptive Negative Presumptive Negative    Fentanyl Screen, Urine Presumptive Negative Presumptive Negative    Opiate Screen, Urine Presumptive Negative Presumptive Negative    Oxycodone Screen, Urine Presumptive Negative Presumptive Negative    PCP Screen, Urine Presumptive Negative Presumptive Negative    Methadone Screen, Urine Presumptive Negative Presumptive Negative   POCT GLUCOSE   Result Value Ref Range    POCT Glucose 261 (H) 74 - 99 mg/dL   ACTIVATED CLOTTING TIME LOW   Result Value Ref Range    POCT Activated Clotting Time Low Range >397 (H) 83 - 199 sec   Electrocardiogram 12 Lead   Result Value Ref Range    Ventricular Rate 82 BPM    Atrial Rate 82 BPM    NV Interval 174 ms    QRS Duration 94 ms    QT Interval 394 ms    QTC Calculation(Bazett) 460 ms    P Axis 63 degrees    R Axis 55 degrees    T Axis 51 degrees    QRS Count 14 beats    Q Onset 216 ms    P Onset 129 ms    P Offset 191 ms    T Offset 413 ms    QTC Fredericia 437 ms   POCT GLUCOSE   Result Value Ref Range    POCT Glucose 338 (H) 74 - 99 mg/dL   POCT GLUCOSE   Result Value Ref Range    POCT Glucose 278 (H) 74 - 99 mg/dL   POCT GLUCOSE   Result Value Ref Range    POCT Glucose 277 (H) 74 - 99 mg/dL   POCT GLUCOSE   Result Value Ref Range    POCT Glucose 227 (H) 74 - 99 mg/dL   POCT GLUCOSE   Result Value Ref Range    POCT Glucose  224 (H) 74 - 99 mg/dL   CBC   Result Value Ref Range    WBC 15.4 (H) 4.4 - 11.3 x10*3/uL    nRBC 0.0 0.0 - 0.0 /100 WBCs    RBC 4.79 4.50 - 5.90 x10*6/uL    Hemoglobin 13.7 13.5 - 17.5 g/dL    Hematocrit 38.3 (L) 41.0 - 52.0 %    MCV 80 80 - 100 fL    MCH 28.6 26.0 - 34.0 pg    MCHC 35.8 32.0 - 36.0 g/dL    RDW 12.8 11.5 - 14.5 %    Platelets 226 150 - 450 x10*3/uL   Basic metabolic panel   Result Value Ref Range    Glucose 244 (H) 74 - 99 mg/dL    Sodium 134 (L) 136 - 145 mmol/L    Potassium 3.8 3.5 - 5.3 mmol/L    Chloride 100 98 - 107 mmol/L    Bicarbonate 24 21 - 32 mmol/L    Anion Gap 14 10 - 20 mmol/L    Urea Nitrogen 31 (H) 6 - 23 mg/dL    Creatinine 1.12 0.50 - 1.30 mg/dL    eGFR 71 >60 mL/min/1.73m*2    Calcium 8.8 8.6 - 10.3 mg/dL   Lactate   Result Value Ref Range    Lactate 1.2 0.4 - 2.0 mmol/L   Heparin Assay   Result Value Ref Range    Heparin Unfractionated 0.1 See Comment Below for Therapeutic Ranges IU/mL   POCT GLUCOSE   Result Value Ref Range    POCT Glucose 163 (H) 74 - 99 mg/dL   POCT GLUCOSE   Result Value Ref Range    POCT Glucose 188 (H) 74 - 99 mg/dL   POCT GLUCOSE   Result Value Ref Range    POCT Glucose 228 (H) 74 - 99 mg/dL   POCT GLUCOSE   Result Value Ref Range    POCT Glucose 272 (H) 74 - 99 mg/dL   POCT GLUCOSE   Result Value Ref Range    POCT Glucose 180 (H) 74 - 99 mg/dL   POCT GLUCOSE   Result Value Ref Range    POCT Glucose 167 (H) 74 - 99 mg/dL   POCT GLUCOSE   Result Value Ref Range    POCT Glucose 278 (H) 74 - 99 mg/dL   POCT GLUCOSE   Result Value Ref Range    POCT Glucose 246 (H) 74 - 99 mg/dL   POCT GLUCOSE   Result Value Ref Range    POCT Glucose 121 (H) 74 - 99 mg/dL   POCT GLUCOSE   Result Value Ref Range    POCT Glucose 92 74 - 99 mg/dL   POCT GLUCOSE   Result Value Ref Range    POCT Glucose 45 (L) 74 - 99 mg/dL   POCT GLUCOSE   Result Value Ref Range    POCT Glucose 160 (H) 74 - 99 mg/dL   POCT GLUCOSE   Result Value Ref Range    POCT Glucose 112 (H) 74 - 99 mg/dL    POCT GLUCOSE   Result Value Ref Range    POCT Glucose 93 74 - 99 mg/dL   POCT GLUCOSE   Result Value Ref Range    POCT Glucose 96 74 - 99 mg/dL   POCT GLUCOSE   Result Value Ref Range    POCT Glucose 122 (H) 74 - 99 mg/dL   POCT GLUCOSE   Result Value Ref Range    POCT Glucose 94 74 - 99 mg/dL   POCT GLUCOSE   Result Value Ref Range    POCT Glucose 239 (H) 74 - 99 mg/dL   POCT GLUCOSE   Result Value Ref Range    POCT Glucose 211 (H) 74 - 99 mg/dL   CBC   Result Value Ref Range    WBC 7.2 4.4 - 11.3 x10*3/uL    nRBC 0.0 0.0 - 0.0 /100 WBCs    RBC 4.48 (L) 4.50 - 5.90 x10*6/uL    Hemoglobin 12.9 (L) 13.5 - 17.5 g/dL    Hematocrit 37.9 (L) 41.0 - 52.0 %    MCV 85 80 - 100 fL    MCH 28.8 26.0 - 34.0 pg    MCHC 34.0 32.0 - 36.0 g/dL    RDW 12.3 11.5 - 14.5 %    Platelets 182 150 - 450 x10*3/uL   Basic Metabolic Panel   Result Value Ref Range    Glucose 50 (LL) 74 - 99 mg/dL    Sodium 136 136 - 145 mmol/L    Potassium 2.9 (LL) 3.5 - 5.3 mmol/L    Chloride 100 98 - 107 mmol/L    Bicarbonate 32 21 - 32 mmol/L    Anion Gap 7 (L) 10 - 20 mmol/L    Urea Nitrogen 26 (H) 6 - 23 mg/dL    Creatinine 0.82 0.50 - 1.30 mg/dL    eGFR >90 >60 mL/min/1.73m*2    Calcium 8.5 (L) 8.6 - 10.3 mg/dL   POCT GLUCOSE   Result Value Ref Range    POCT Glucose 46 (L) 74 - 99 mg/dL   POCT GLUCOSE   Result Value Ref Range    POCT Glucose 111 (H) 74 - 99 mg/dL   POCT GLUCOSE   Result Value Ref Range    POCT Glucose 259 (H) 74 - 99 mg/dL   POCT GLUCOSE   Result Value Ref Range    POCT Glucose 306 (H) 74 - 99 mg/dL     Scheduled medications   Medication Dose Route Frequency    aspirin  81 mg oral Daily    atorvastatin  40 mg oral Nightly    clopidogrel  75 mg oral Daily    enoxaparin  40 mg subcutaneous Daily    gabapentin  300 mg oral TID    insulin glargine  20 Units subcutaneous Nightly    insulin regular  0-5 Units subcutaneous TID AC    levothyroxine  150 mcg oral Daily    morphine  4 mg intravenous Once    potassium chloride CR  40 mEq oral Once      PRN medications   Medication    acetaminophen    Or    acetaminophen    Or    acetaminophen    dextrose    dextrose    glucagon    glucagon    melatonin    polyethylene glycol                       [place] Coma Scale  Best Eye Response: Spontaneous  Best Verbal Response: Confused  Best Motor Response: Follows commands  [place] Coma Scale Score: 14                 I have personally reviewed the following imaging results:   Imaging  MR brain wo IV contrast    Result Date: 4/12/2025  1. Minimal white-matter changes are nonspecific but may represent small-vessel ischemic disease in a patient of this age. No evidence of acute ischemic injury. 2. Diffuse parenchymal volume loss. Disproportionate ventriculomegaly could reflect more pronounced central volume loss but may also be seen with normal pressure hydrocephalus in the appropriate clinical setting. 3. Opacification of numerous right-sided mastoid air cells.     MACRO: None   Signed by: Izzy Pacheco 4/12/2025 3:08 PM Dictation workstation:   [id number]    CT chest abdomen pelvis wo IV contrast    Result Date: 4/10/2025  Chest: *Minimal cortical buckling of the right anterolateral 6th-8th ribs, indeterminate for nondisplaced fractures. Correlate with focal pain on exam. *Left lower lobe 3 mm nodule. No further follow-up is required, however, if the patient has high risk factors for primary lung malignancy, follow-up noncontrast CT scan chest in 12 months may be obtained. (Rg MacMahon et al., Guidelines for management of incidental pulmonary nodules detected on CT images: From the Fleischner Society 2017, Radiology. 2017 Jul;284 (1):228-243.)   Abdomen/pelvis: *No acute findings in the abdomen or pelvis. *The stomach and esophagus are distended with fluid contents which places patient at increased risk for aspiration. *Other chronic findings as discussed above.   MACRO: Critical Finding:  See findings. Notification was initiated on 4/10/2025 at 3:34 am by   Juan Atkins.  (**-YCF-**) Instructions:   Signed by: Juan Atkins 4/10/2025 3:38 AM Dictation workstation:   [id number]    CT lumbar spine retrospective reconstruction protocol    Result Date: 4/10/2025  No acute fracture or traumatic subluxation of the thoracic and lumbar spine.   MACRO: None   Signed by: Juan Atkins 4/10/2025 3:24 AM Dictation workstation:   [id number]    CT thoracic spine retrospective reconstruction protocol    Result Date: 4/10/2025  No acute fracture or traumatic subluxation of the thoracic and lumbar spine.   MACRO: None   Signed by: Juan Atkins 4/10/2025 3:24 AM Dictation workstation:   [id number]    CT cervical spine wo IV contrast    Result Date: 4/10/2025  1. No acute fractures or traumatic subluxation. 2. Additional chronic findings as discussed above.   MACRO: None   Signed by: Juan Atkins 4/10/2025 3:18 AM Dictation workstation:   [id number]    CT head wo IV contrast    Result Date: 4/10/2025  1. No acute intracranial abnormality. 2. Prominence of the ventricles is likely related to generalized cerebral volume loss although normal pressure hydrocephalus is difficult to exclude. Correlate with symptoms. 3. Other chronic findings as discussed above.   MACRO: None   Signed by: Juan Atkins 4/10/2025 3:13 AM Dictation workstation:   [id number]    XR pelvis 1-2 views    Result Date: 4/10/2025  Pelvic ring is intact. Normal hip alignment bilaterally. No acute fractures. If there is high suspicion for fracture on exam, recommend further evaluation with CT.   MACRO: None   Signed by: Juan Atkins 4/10/2025 1:49 AM Dictation workstation:   [id number]    XR chest 1 view    Result Date: 4/10/2025  1. No acute cardiopulmonary process.   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 Terry as outpt.-information placed in discharge     B12, folate, TSH, Ammonia, rEEG    ENT for difficulty swallowing   Dr. Ford to see this afternoon.        VALERIO Hess-CNP    Patient seen and examined.  Patient continued to have some swallowing difficulty with thin liquids but is able to eat normally with semisolid and solid foods.  Would recommend a further evaluation to look for any mechanical obstruction or dysphagia since the MRI is negative for any stroke except for ventriculomegaly which could be due to atrophy.  Family does not want any surgical intervention but if they decide then can see Dr. Terry for further evaluation of NPH though patient does not have any other symptoms except for memory loss    If patient continues a problem may need a detailed neuropsych testing    I had a long discussion with patient and will try to get some more history from the family as to the diagnosis of dementia and vascular dementia and where it was entertained    In the meantime we will do the organic workup for dementia and see for any treatable causes of dementia    Continue with supportive care and have palliative care discussed at great length with family and what their plans are    Due to technical limitations of voice recognition and human error, this note may not accurately reflect the care of the patient.    Yuri Ford MD neurology

## 2025-04-14 NOTE — PROGRESS NOTES
Pt. Accepted at [facility] the [facility], updates sent, currently awaiting insSoheila David.

## 2025-04-14 NOTE — CARE PLAN
Problem: Chronic Conditions and Co-morbidities  Goal: Patient's chronic conditions and co-morbidity symptoms are monitored and maintained or improved  Outcome: Progressing     Problem: Nutrition  Goal: Nutrient intake appropriate for maintaining nutritional needs  Outcome: Progressing

## 2025-04-14 NOTE — PROGRESS NOTES
Occupational Therapy    OT Treatment    Patient Name: Jw Salinas  MRN: [id number]  Department: [facility]  Room: [facility]80-  Today's Date: 4/14/2025  Time Calculation  Start Time: 1112  Stop Time: 1124  Time Calculation (min): 12 min      Assessment:  OT Assessment: Pt. progressing well towards OT POC goal areas. Will update goals to reflect pt. progress. SBA / CGA for cedrick trasnfmonse this date.  Prognosis: Good  Barriers to Discharge Home: Physical needs  Physical Needs: Stair navigation to access bath limited by function/safety, Stair navigation to access bed limited by function/safety, Stair navigation into home limited by function/safety  Evaluation/Treatment Tolerance: Patient tolerated treatment well  End of Session Communication: Bedside nurse  End of Session Patient Position: Alarm on, Up in chair (needs met; lunch placed.)  OT Assessment Results: Decreased ADL status, Decreased upper extremity strength, Decreased safe judgment during ADL, Decreased cognition, Decreased endurance, Decreased functional mobility  Prognosis: Good  Evaluation/Treatment Tolerance: Patient tolerated treatment well  Plan:  Treatment Interventions: ADL retraining, Functional transfer training, Endurance training, Compensatory technique education  OT Frequency: 2 times per week  OT Discharge Recommendations: Moderate intensity level of continued care  Equipment Recommended upon Discharge: Wheeled walker  OT Recommended Transfer Status: Assist of 1  OT - OK to Discharge: Yes (ok to d/c to next level of care once medically cleared)    Subjective   Previous Visit Info:  OT Last Visit  OT Received On: 04/14/25  General:  General  Reason for Referral: ADL impairment  Referred By: Rocky  Past Medical History Relevant to Rehab: HTN, HLD, Dementia, DM  Family/Caregiver Present: No  Prior to Session Communication: Bedside nurse  Patient Position Received: Up in chair, Alarm on  General Comment: pt. is pleasant and agreeable to OT  treatment.    Pain:  Pain Assessment  Pain Assessment: 0-10  0-10 (Numeric) Pain Score: 0 - No pain    Objective    Cognition:  Cognition  Orientation Level: Disoriented to time, Disoriented to place (pt. initially thought he was at a nursing home.)    Activities of Daily Living:    Grooming: SBA at sinkside for hand hygiene grooming tasks.    Toileting  Toileting Comments: Distant supervision for toileting tasks. Pt. did not have pants on to manage clothing, however VCs to clear gown out of the way to avoid soiled gown. Pt. completes toileting tasks with supervision.    Bed Mobility/Transfers: Bed Mobility  Bed Mobility: No    Transfers  Transfer: Yes  Transfer 1  Technique 1: Sit to stand  Transfer Device 1: Walker  Trials/Comments 1: SBA for safety and VCs for hand placement on arm rests of recliner chair    Functional Mobility:  Functional Mobility  Functional Mobility Performed:  (SBA for household distances; pt. mobilizes to bathroom for ADL participation. WW use for safety.)    Standing Balance:  Dynamic Standing Balance  Dynamic Standing-Comments: Fair + dyn standing balance at sinkside for pt. to complete hand hygiene. Pt. completes with SBA for safety and VC for walker placement for use if needed; does not demo use of UE support on walker.    Outcome Measures:[facility] Daily Activity  Putting on and taking off regular lower body clothing: A little  Bathing (including washing, rinsing, drying): A lot  Putting on and taking off regular upper body clothing: A little  Toileting, which includes using toilet, bedpan or urinal: A little  Taking care of personal grooming such as brushing teeth: A little  Eating Meals: None  Daily Activity - Total Score: 18    Education Documentation  Body Mechanics, taught by Sharon Roberts, OT at 4/14/2025  2:33 PM.  Learner: Patient  Readiness: Acceptance  Method: Explanation  Response: Verbalizes Understanding, Needs Reinforcement    IP  EDUCATION:  Education  Individual(s) Educated: Patient  Education Provided: POC discussed and agreed upon, Risk and benefits of OT discussed with patient or other, Fall precautons    Goals:  Encounter Problems       Encounter Problems (Active)       OT Goals       Pt will complete all functional transfers with SBA using FWW. (Progressing)       Start:  04/12/25    Expected End:  04/26/25            Pt will complete household distance functional mobility with SBA using FWW. (Met)       Start:  04/12/25    Expected End:  04/26/25    Resolved:  04/14/25    Updated to: Pt will complete household distance functional mobility with Supervision using FWW.    Update reason: status progression         Pt will improve dynamic standing balance to fair during functional tasks. (Met)       Start:  04/12/25    Expected End:  04/26/25    Resolved:  04/14/25    Updated to: Pt will improve dynamic standing balance to Good during functional tasks.    Update reason: status progressing          Pt will complete LB dressing with SBA. (Progressing)       Start:  04/12/25    Expected End:  04/26/25            Pt. will complete toileting at Mod I level  (Progressing)       Start:  04/14/25    Expected End:  04/26/25                Pt will improve dynamic standing balance to Good during functional tasks. (Progressing)       Start:  04/14/25    Expected End:  04/26/25                Pt will complete household distance functional mobility with Supervision using FWW. (Progressing)       Start:  04/14/25    Expected End:  04/26/25

## 2025-04-15 LAB
ANION GAP SERPL CALC-SCNC: 9 MMOL/L (ref 10–20)
BUN SERPL-MCNC: 29 MG/DL (ref 6–23)
CALCIUM SERPL-MCNC: 8.3 MG/DL (ref 8.6–10.3)
CHLORIDE SERPL-SCNC: 101 MMOL/L (ref 98–107)
CO2 SERPL-SCNC: 31 MMOL/L (ref 21–32)
CREAT SERPL-MCNC: 0.84 MG/DL (ref 0.5–1.3)
EGFRCR SERPLBLD CKD-EPI 2021: >90 ML/MIN/1.73M*2
ERYTHROCYTE [DISTWIDTH] IN BLOOD BY AUTOMATED COUNT: 12.2 % (ref 11.5–14.5)
GLUCOSE BLD MANUAL STRIP-MCNC: 162 MG/DL (ref 74–99)
GLUCOSE BLD MANUAL STRIP-MCNC: 251 MG/DL (ref 74–99)
GLUCOSE BLD MANUAL STRIP-MCNC: 322 MG/DL (ref 74–99)
GLUCOSE BLD MANUAL STRIP-MCNC: 323 MG/DL (ref 74–99)
GLUCOSE BLD MANUAL STRIP-MCNC: 348 MG/DL (ref 74–99)
GLUCOSE BLD MANUAL STRIP-MCNC: 360 MG/DL (ref 74–99)
GLUCOSE SERPL-MCNC: 194 MG/DL (ref 74–99)
HCT VFR BLD AUTO: 33.5 % (ref 41–52)
HGB BLD-MCNC: 11.5 G/DL (ref 13.5–17.5)
MCH RBC QN AUTO: 28.8 PG (ref 26–34)
MCHC RBC AUTO-ENTMCNC: 34.3 G/DL (ref 32–36)
MCV RBC AUTO: 84 FL (ref 80–100)
NRBC BLD-RTO: 0 /100 WBCS (ref 0–0)
PLATELET # BLD AUTO: 178 X10*3/UL (ref 150–450)
POTASSIUM SERPL-SCNC: 3.3 MMOL/L (ref 3.5–5.3)
RBC # BLD AUTO: 3.99 X10*6/UL (ref 4.5–5.9)
SODIUM SERPL-SCNC: 138 MMOL/L (ref 136–145)
WBC # BLD AUTO: 6.9 X10*3/UL (ref 4.4–11.3)

## 2025-04-15 PROCEDURE — 92526 ORAL FUNCTION THERAPY: CPT | Mod: GN | Performed by: SPEECH-LANGUAGE PATHOLOGIST

## 2025-04-15 PROCEDURE — 97116 GAIT TRAINING THERAPY: CPT | Mod: GP,CQ

## 2025-04-15 PROCEDURE — 82947 ASSAY GLUCOSE BLOOD QUANT: CPT

## 2025-04-15 PROCEDURE — 2500000001 HC RX 250 WO HCPCS SELF ADMINISTERED DRUGS (ALT 637 FOR MEDICARE OP): Performed by: INTERNAL MEDICINE

## 2025-04-15 PROCEDURE — 2500000001 HC RX 250 WO HCPCS SELF ADMINISTERED DRUGS (ALT 637 FOR MEDICARE OP): Performed by: STUDENT IN AN ORGANIZED HEALTH CARE EDUCATION/TRAINING PROGRAM

## 2025-04-15 PROCEDURE — 97530 THERAPEUTIC ACTIVITIES: CPT | Mod: GP,CQ

## 2025-04-15 PROCEDURE — 2500000004 HC RX 250 GENERAL PHARMACY W/ HCPCS (ALT 636 FOR OP/ED): Performed by: STUDENT IN AN ORGANIZED HEALTH CARE EDUCATION/TRAINING PROGRAM

## 2025-04-15 PROCEDURE — 80048 BASIC METABOLIC PNL TOTAL CA: CPT | Performed by: STUDENT IN AN ORGANIZED HEALTH CARE EDUCATION/TRAINING PROGRAM

## 2025-04-15 PROCEDURE — 2500000002 HC RX 250 W HCPCS SELF ADMINISTERED DRUGS (ALT 637 FOR MEDICARE OP, ALT 636 FOR OP/ED): Performed by: STUDENT IN AN ORGANIZED HEALTH CARE EDUCATION/TRAINING PROGRAM

## 2025-04-15 PROCEDURE — 2500000001 HC RX 250 WO HCPCS SELF ADMINISTERED DRUGS (ALT 637 FOR MEDICARE OP): Performed by: NURSE PRACTITIONER

## 2025-04-15 PROCEDURE — 1200000002 HC GENERAL ROOM WITH TELEMETRY DAILY

## 2025-04-15 PROCEDURE — 85027 COMPLETE CBC AUTOMATED: CPT | Performed by: STUDENT IN AN ORGANIZED HEALTH CARE EDUCATION/TRAINING PROGRAM

## 2025-04-15 PROCEDURE — 2500000002 HC RX 250 W HCPCS SELF ADMINISTERED DRUGS (ALT 637 FOR MEDICARE OP, ALT 636 FOR OP/ED): Performed by: NURSE PRACTITIONER

## 2025-04-15 PROCEDURE — 36415 COLL VENOUS BLD VENIPUNCTURE: CPT | Performed by: STUDENT IN AN ORGANIZED HEALTH CARE EDUCATION/TRAINING PROGRAM

## 2025-04-15 PROCEDURE — 99232 SBSQ HOSP IP/OBS MODERATE 35: CPT | Performed by: INTERNAL MEDICINE

## 2025-04-15 RX ORDER — INSULIN LISPRO 100 [IU]/ML
8 INJECTION, SOLUTION INTRAVENOUS; SUBCUTANEOUS ONCE
Status: COMPLETED | OUTPATIENT
Start: 2025-04-15 | End: 2025-04-15

## 2025-04-15 RX ADMIN — GABAPENTIN 300 MG: 300 CAPSULE ORAL at 08:20

## 2025-04-15 RX ADMIN — LEVOTHYROXINE SODIUM 150 MCG: 75 TABLET ORAL at 05:32

## 2025-04-15 RX ADMIN — GABAPENTIN 300 MG: 300 CAPSULE ORAL at 22:23

## 2025-04-15 RX ADMIN — GABAPENTIN 300 MG: 300 CAPSULE ORAL at 15:31

## 2025-04-15 RX ADMIN — Medication 10 MG: at 22:28

## 2025-04-15 RX ADMIN — INSULIN GLARGINE 20 UNITS: 100 INJECTION, SOLUTION SUBCUTANEOUS at 22:19

## 2025-04-15 RX ADMIN — INSULIN HUMAN 4 UNITS: 100 INJECTION, SOLUTION PARENTERAL at 16:26

## 2025-04-15 RX ADMIN — INSULIN HUMAN 1 UNITS: 100 INJECTION, SOLUTION PARENTERAL at 08:20

## 2025-04-15 RX ADMIN — ENOXAPARIN SODIUM 40 MG: 40 INJECTION SUBCUTANEOUS at 08:20

## 2025-04-15 RX ADMIN — INSULIN LISPRO 8 UNITS: 100 INJECTION, SOLUTION INTRAVENOUS; SUBCUTANEOUS at 00:57

## 2025-04-15 RX ADMIN — ATORVASTATIN CALCIUM 40 MG: 20 TABLET, FILM COATED ORAL at 22:24

## 2025-04-15 RX ADMIN — INSULIN HUMAN 3 UNITS: 100 INJECTION, SOLUTION PARENTERAL at 11:25

## 2025-04-15 RX ADMIN — CLOPIDOGREL BISULFATE 75 MG: 75 TABLET, FILM COATED ORAL at 08:20

## 2025-04-15 RX ADMIN — ASPIRIN 81 MG: 81 TABLET, CHEWABLE ORAL at 08:20

## 2025-04-15 ASSESSMENT — COGNITIVE AND FUNCTIONAL STATUS - GENERAL
MOVING FROM LYING ON BACK TO SITTING ON SIDE OF FLAT BED WITH BEDRAILS: A LITTLE
CLIMB 3 TO 5 STEPS WITH RAILING: A LOT
MOBILITY SCORE: 16
WALKING IN HOSPITAL ROOM: A LOT
TURNING FROM BACK TO SIDE WHILE IN FLAT BAD: A LITTLE
MOVING TO AND FROM BED TO CHAIR: A LITTLE
STANDING UP FROM CHAIR USING ARMS: A LITTLE

## 2025-04-15 ASSESSMENT — PAIN - FUNCTIONAL ASSESSMENT
PAIN_FUNCTIONAL_ASSESSMENT: 0-10
PAIN_FUNCTIONAL_ASSESSMENT: 0-10

## 2025-04-15 ASSESSMENT — PAIN SCALES - GENERAL
PAINLEVEL_OUTOF10: 0 - NO PAIN
PAINLEVEL_OUTOF10: 0 - NO PAIN

## 2025-04-15 NOTE — CARE PLAN
Family is refusing any surgical intervention for NPH at this time. He can follow up with Dr. Terry as oupt. If pt. Or family changes mind. Pt./family can pursue further Neurological testing as oupt. If desired. No further needs from neurology; okay for transfer or discharge as per primary team. Please contact if condition changes for re-eval.

## 2025-04-15 NOTE — PROGRESS NOTES
1620- precert remains pending at this time.   Care transitions will continue to follow to assist as needed.

## 2025-04-15 NOTE — PROGRESS NOTES
Physical Therapy    Physical Therapy Treatment    Patient Name: Jw Salinas  MRN: [id number]  Today's Date: 4/15/2025  Time Calculation  Start Time: 0924  Stop Time: 0949  Time Calculation (min): 25 min     806/806-A    Assessment/Plan   PT Assessment  PT Assessment Results: Decreased strength, Decreased endurance, Impaired balance, Decreased mobility, Decreased safety awareness  Rehab Prognosis: Good  Barriers to Discharge Home:  (lives alone, has to negotiate 13 steps at home)  Evaluation/Treatment Tolerance: Patient tolerated treatment well  Medical Staff Made Aware: Yes  Strengths: Support of Caregivers  Barriers to Participation: Comorbidities  End of Session Communication: Bedside nurse  Assessment Comment: pt continues to need increased assitance for mobility , participating well and receptive to instruction, pt would benefit from continued therapy  End of Session Patient Position: Alarm on, Up in chair     Treatment/Interventions: Bed mobility, Transfer training, Gait training, Stair training  PT Plan: Ongoing PT  PT Frequency: 3 times per week  PT Discharge Recommendations: Moderate intensity level of continued care  Equipment Recommended upon Discharge: Wheeled walker PT Recommended Transfer Status: Assist x1    General Visit Information:   PT  Visit  PT Received On: 04/15/25  General  Reason for Referral: impaired mobility  Referred By: Rocky  Past Medical History Relevant to Rehab: HTN, HLD, Dementia, DM  Family/Caregiver Present: Yes  Caregiver Feedback: son present and supportive  Prior to Session Communication: Bedside nurse (cleared to participate)  Patient Position Received: Bed, 3 rail up, Alarm on  General Comment: agreeable to participate and sit up in chair (pt  states that he feels he is ready to go home.Pt does not use WW at baseline, does not use device at baseline Currentl using WW for safety)      Subjective     Precautions:  Precautions  Medical Precautions: Fall precautions    Vital  Signs:     Objective     Pain:  Pain Assessment  Pain Assessment: 0-10  0-10 (Numeric) Pain Score: 0 - No pain (denies pain)    Cognition:  Cognition  Overall Cognitive Status:  (oriented to name and birthdate)  Orientation Level: Disoriented to place, Disoriented to time, Disoriented to situation  Processing Speed: Delayed      Activity Tolerance:  Activity Tolerance  Endurance: Decreased tolerance for upright activites    Treatments:  Therapeutic Exercise  Therapeutic Exercise Performed:  (pt tolerated standing for up to 1 min with single UE support and CGA)     Bed Mobility  Bed Mobility: Yes (tansfers stupine to sit with Min A  with head of bed elevated and vc for technique)  Ambulation/Gait Training  Ambulation/Gait Training Performed: Yes (ambulating 50 ft with WW and Min A with vc for WW placement , to increase EB and step length   Pt's EB becomes more narrow with increased distance 2 LOB noted , pt able to correct with Min A and WW)  Transfers  Transfer: Yes  Transfer 1  Trials/Comments 1: transfers sit <--> stand with WW and Min A with vc for hand placement and safety . pt receptive to instructiuon , with vc for follow through  Stairs  Stairs: No       Outcome Measures:     [facility] Basic Mobility  Turning from your back to your side while in a flat bed without using bedrails: A little  Moving from lying on your back to sitting on the side of a flat bed without using bedrails: A little  Moving to and from bed to chair (including a wheelchair): A little  Standing up from a chair using your arms (e.g. wheelchair or bedside chair): A little  To walk in hospital room: A lot  Climbing 3-5 steps with railing: A lot  Basic Mobility - Total Score: 16        EDUCATION:  Individual(s) Educated: Patient  Education Provided: Body Mechanics, Fall Risk  Patient Response to Education: Patient/Caregiver Verbalized Understanding of Information    Encounter Problems       Encounter Problems (Active)       PT Problem       Bed  mobility supine <> sit independent  (Progressing)       Start:  04/12/25    Expected End:  04/26/25            Transfers sit <> stand with ww SBA (Progressing)       Start:  04/12/25    Expected End:  04/26/25            Patient to ambulate with ww 30' SBA (Progressing)       Start:  04/12/25    Expected End:  04/26/25            Patient to negotiate 15 steps with handrail cGA (Progressing)       Start:  04/12/25    Expected End:  04/26/25            Patient to stand with ww > 1 minute without LOB  (Progressing)       Start:  04/12/25    Expected End:  04/26/25

## 2025-04-15 NOTE — PROGRESS NOTES
"Jw Salinas is a 70 y.o. male on day 5 of admission presenting with NSTEMI (non-ST elevated myocardial infarction) (Multi).    Subjective   Patient seen and examined.  Resting comfortably in bed. No pain. Some cough. No other complaints.     Objective     Physical Exam  Constitutional:       Comments: Oriented to place and time, intermittently gets confused and agitated   HENT:      Head: Normocephalic.   Cardiovascular:      Rate and Rhythm: Normal rate and regular rhythm.   Pulmonary:      Effort: Pulmonary effort is normal. No respiratory distress.   Abdominal:      General: There is no distension.      Palpations: Abdomen is soft.   Musculoskeletal:         General: No deformity.   Neurological:      General: No focal deficit present.      Mental Status: He is alert.   Last Recorded Vitals  Blood pressure 134/63, pulse 82, temperature 36.7 °C (98.1 °F), temperature source Temporal, resp. rate 18, height 1.778 m (5' 10\"), weight 64.2 kg (141 lb 8.6 oz), SpO2 98%.  Intake/Output last 3 Shifts:  I/O last 3 completed shifts:  In: 670 (10.4 mL/kg) [P.O.:670]  Out: - (0 mL/kg)   Weight: 64.2 kg     Relevant Results            This patient currently has cardiac telemetry ordered; if you would like to modify or discontinue the telemetry order, click here to go to the orders activity to modify/discontinue the order.            Malnutrition Diagnosis Status: Active  Malnutrition Diagnosis: Severe malnutrition related to chronic disease or condition  Related to: hx dementia  As Evidenced by: severe muscle wasting, severe fat loss  I agree with the dietitian's malnutrition diagnosis.      Assessment/Plan   Assessment & Plan    70-year-old male with past medical history of dementia who was previously recommended institutionalization, diabetes, hypertension presented with chest pain was admitted with a diagnosis of acute NSTEMI    Underwent coronary angiogram with successful insertion of 2 SARAH to LAD  He was medically " cleared for discharge   Patient was having swallowing issues and issues with balance  Failed his MBS was recommended a PEG tube  I did have multiple discussions with the son and he said that his father would not want a PEG tube  He also told me the same was told 2 years ago and his father did well since then  Patient has been coughing/aspirating in the hospital  At this point  no plan on putting in a PEG tube  He was restarted on diet yesterday, currently on soft, bite-size foods and level 2 thick liquids  Patient is also having balance issues PT OT recommended placement  Working on getting him to a SNF  Continue rest of the medical management he is on including statin and dual antiplatelet therapy  We did get an MRI of the brain which did not show any acute stroke or infarct did show findings consistent with NPH, neurology has been consulted   DVT prophylaxis    4/15/25:  No significant changes today  D/w patient and son and he reiterated that he does not want a feeding tube; they are well aware of risks of aspiration  Similarly not interested in any intervention for possible NPH at this time; cont with outpatient follow up  Pending SNF             Ronen Zabala MD

## 2025-04-15 NOTE — PROGRESS NOTES
Inpatient Speech Language Pathology Treatment Note     Patient Name: Jw Salinas  MRN: [id number]  : 1955  Patient Room: [facility]  Today's Date: 04/15/25  Time Calculation  Start Time: 1145  Stop Time: 1200  Time Calculation (min): 15 min     Current Problem:   1. NSTEMI (non-ST elevated myocardial infarction) (Multi)  Transthoracic Echo (TTE) Complete    Transthoracic Echo (TTE) Complete    Case Request Cath Lab: Left Heart Cath    Case Request Cath Lab: Left Heart Cath    Cardiac Catheterization Procedure    Cardiac Catheterization Procedure    Referral to Cardiac Rehab - outpatient (for providers who will be following patient along cardiac rehab)    atorvastatin (Lipitor) 40 mg tablet    clopidogrel (Plavix) 75 mg tablet    aspirin 81 mg chewable tablet    metoprolol tartrate (Lopressor) 25 mg tablet    Referral to Primary Care    Referral to Home Care    EEG      2. Syncope and collapse        3. Closed head injury, initial encounter        4. Acute renal failure, unspecified acute renal failure type        5. Hypomagnesemia        6. Hypokalemia        7. Hypocalcemia        8. Acute cervical myofascial strain, initial encounter        9. Hyperglycemia        10. Closed fracture of multiple ribs of right side, initial encounter            PLAN:  Skilled speech therapy for dysphagia treatment continues to be warranted to provide training and instruction regarding the use of compensatory swallow strategies, for pt/caregiver education in order to reduce risk of aspiration, dehydration and malnutrition. , to assess tolerance of diet , to determine ability to upgrade diet after PO trials with SLP  SLP TX Plan: Continue Plan of Care  SLP Frequency: 2x per week  Duration: 30 days  Discussed POC: Patient  Discussed Risks/Benefits: Patient  Patient/Caregiver Agreeable: Yes  Continue skilled SLP at next level of care: Yes Intensity of care recommend: high      Recommendations:   Solid Diet Recommendations: Soft &  "bite sized/chopped (IDDSI Level 6)  Liquid Diet Recommendations: Thin (IDDSI Level 0)  Compensatory strategies: small bites/sips, alternate bites/sips, upright 90 degrees for intake   Medication administration:     SLP Assessment:  Pt upright in chair eating lunch. Pt consumed 100% of lunch tray independently. Pt reported no difficulty with mastication and/or swallowing solid consistencies. Pt was able to reiterate that he does not like the nectar thick liquids and would like to drink \"regular water\". Pt educated on aspiration and the potential side effects. Pt was also educate on the results of his most recent MBS study where it was determine aspiration is most likely occurring with all P.O. intake and that the safest recommendation at this time would be NPO with alternative means of nutrition. Pt acknowledged this and stated he does not want any tube feeds as well. Pt trialed single sips of thin liquids via cup and straw. No overt s/s aspiration and/or penetration observed. Given pt's desire to continue to eat and drink by mouth and refusal for alternative means of nutrition, diet will be upgraded to thin liquids at this time. Silent aspiration is highly likely.     Subjective:  Pt. Seen at bedside for skilled dysphagia treatment.   Prior to Session Communication: Bedside nurse    Pain:  Ratin-10   Pt report: 0  Action taken: none needed     Oxygen Status:   room air       Goals:   Established on- 25  Duration: 30 days   -Patient will tolerate current diet without noted pulmonary compromise or evidence of pulmonary sequela as noted in patient chart and/or reported by patient/family.- pt is likely aspirating current diet but no decline in respiratory status at this time  - Patient will independently implement safe swallowing strategies to reduce risk of aspiration with use of compensatory strategies during 90% of therapeutic trials. - Pt was able to repeat \"slow, single sips\" and \"if something feels off " "tell the nurse\" when going over safe swallowing strategies   - Pt will complete effortful swallows 10 reps, 3 x a day for 7 days a week; to strengthen pharyngeal muscles for improve bolus clearance through the pharynx.  - not addressed  - Pt will complete isokinetic CTAR 3-5 seconds; 10 times, independent of cues, to improve hyolaryngeal elevation and UES opening; impacting airway protection and pharyngeal clearance during the swallow. - not addressed  - Pt will compete jose maneuver 10 times, 3x a day, 7 days a week, independent of cues to improve posterior pharyngeal wall contraction/strength necessary for bolus clearance through the pharynx. - not addressed    Treatment Outcome:  SLP TX Intervention Outcome: Making Progress Towards Goals    Treatment Tolerance: Patient tolerated treatment well   Prognosis: Fair   Barriers: Cognition, Comorbidities   Medical Staff Made Aware: Yes     SLP Inpatient Education:  Learner patient   Barriers to Learning Acuteness of the illness, Cognitive limitations   Method Verbal, Demonstration   Education - Topic ST provided patient education regarding role of ST, purpose of treatment, clinical impressions, goals of care     Outcome    1= partially meets; needs review     "

## 2025-04-15 NOTE — PROGRESS NOTES
"Nutrition Follow Up Assessment:   Nutrition Assessment         Patient is a 70 y.o. male presenting with NSTEMI  past medical history of vascular dementia, HTN, HLD, chronic pain on gabapentin, and poorly-controlled T1DM (per endocrine note 5/21/2024, type 1, previous records also show type 2) who presented to the ED for syncope and collapse.      Nutrition History:  Energy Intake: Good > 75 % (x 2 documented meals)  Pain affecting nutrition status: N/A  Food and Nutrient History: RDN follow up. Chart reviewed, MBS completed 4/11 with recommendation for NPO status with alternate means of nutrition/hydration. Pt and son have declined both PEG tube and Corpak. Met with pt today who reports good appetite, states he is tolerating diet as recommended per SLP. Pt denies GI symptoms at this time. Pt agreeable to continue ONS. Will continue to monitor.       Anthropometrics:  Height: 177.8 cm (5' 10\")   Weight: 64.2 kg (141 lb 8.6 oz)   BMI (Calculated): 20.31  IBW/kg (Dietitian Calculated): 75.5 kg  Percent of IBW: 85 %                      Weight History:      04/10/25 64.2 kg (141 lb 8.6 oz)   05/21/24 67 kg (147.4 lb)   03/16/23 77.1 kg (170 lb)     Weight Change %:  Weight History / % Weight Change: no new wt to assess    Nutrition Focused Physical Exam Findings:  See 4/11 note  Subcutaneous Fat Loss:      Muscle Wasting:     Edema:  Edema: none  Physical Findings:  Skin: Positive (for PI)    Nutrition Significant Labs:  BMP Trend:   Results from last 7 days   Lab Units 04/15/25  0530 04/14/25  0551 04/11/25  0639 04/10/25  0111   GLUCOSE mg/dL 194* 50* 244* 221*   CALCIUM mg/dL 8.3* 8.5* 8.8 7.4*   SODIUM mmol/L 138 136 134* 132*   POTASSIUM mmol/L 3.3* 2.9* 3.8 2.8*   CO2 mmol/L 31 32 24 21   CHLORIDE mmol/L 101 100 100 97*   BUN mg/dL 29* 26* 31* 54*   CREATININE mg/dL 0.84 0.82 1.12 1.99*    , A1C:  Lab Results   Component Value Date    HGBA1C 11.5 (H) 05/18/2024   , BG POCT trend:   Results from last 7 days   Lab " Units 04/15/25  1105 04/15/25  0637 04/15/25  0249 04/14/25  2334 04/14/25  2033   POCT GLUCOSE mg/dL 251* 162* 323* 393* 354*        Nutrition Specific Medications:  Scheduled medications  aspirin, 81 mg, oral, Daily  atorvastatin, 40 mg, oral, Nightly  insulin glargine, 20 Units, subcutaneous, Nightly  insulin regular, 0-5 Units, subcutaneous, TID AC  levothyroxine, 150 mcg, oral, Daily  potassium chloride CR, 40 mEq, oral, Once    I/O:    ;      Dietary Orders (From admission, onward)       Start     Ordered    04/15/25 1152  Adult diet Consistent Carb; CCD 75 gm/meal; Soft and bite sized 6; Thin 0  Diet effective now        Question Answer Comment   Diet type Consistent Carb    Carb diet selection: CCD 75 gm/meal    Texture Soft and bite sized 6    Fluid consistency Thin 0        04/15/25 1151    04/11/25 1404  Oral nutritional supplements  Until discontinued        Question Answer Comment   Deliver with All meals    Select supplement: Magic Cup Reduced Sugar        04/11/25 1403    04/10/25 1007  May Participate in Room Service With Assistance  ( ROOM SERVICE MAY PARTICIPATE WITH ASSISTANCE)  Once        Question:  .  Answer:  Yes    04/10/25 1006                     Estimated Needs:   Total Energy Estimated Needs in 24 hours (kCal): 1926 kCal  Method for Estimating Needs: 30 kcal/kg ABW  Total Protein Estimated Needs in 24 Hours (g): 77 g  Method for Estimating 24 Hour Protein Needs: 1.2 g/kg ABW  Total Fluid Estimated Needs in 24 Hours (mL): 1926 mL  Method for Estimating 24 Hour Fluid Needs: 1 ml/kcal or per MD  Patient on Order Fluid Restriction: No        Nutrition Diagnosis   Malnutrition Diagnosis  Patient has Malnutrition Diagnosis: Yes  Diagnosis Status: Active  Malnutrition Diagnosis: Severe malnutrition related to chronic disease or condition  Related to: hx dementia  As Evidenced by: severe muscle wasting, severe fat loss  Additional Assessment Information: suspect poor oral intake prior to  admission as well although time frame is unknown            Nutrition Interventions/Recommendations   Nutrition prescription for oral nutrition    Nutrition Recommendations:  Individualized Nutrition Prescription Provided for : Cardiac, 75 g Carb Control, Soft and Bite Sized, thin liquid diet with ONS - texture/consistency per SLP    Nutrition Interventions/Goals:   Meals and Snacks: Carbohydrate-modified diet, Fat-modified diet, General healthful diet, Mineral-modified diet, Texture-modified diet  Goal: Consumes 3 meals per day  Medical Food Supplement: Commercial beverage medical food supplement therapy  Goal: Magic Cup Reduced Sugar TID (provides 280 kcal and 9 g protein per serving).      Education Documentation  No documentation found.    No needs at this time        Nutrition Monitoring and Evaluation   Food/Nutrient Related History Monitoring  Monitoring and Evaluation Plan: Intake / amount of food, Estimated Energy Intake  Estimated Energy Intake: Energy intake greater or equal to 75% of estimated energy needs  Intake / Amount of food: Consumes at least 75% or more of meals/snacks/supplements, Meets > 75% estimated energy needs    Anthropometric Measurements  Monitoring and Evaluation Plan: Body weight  Body Weight: Body weight - Maintain stable weight    Biochemical Data, Medical Tests and Procedures  Monitoring and Evaluation Plan: Electrolyte/renal panel, Glucose/endocrine profile  Electrolyte and Renal Panel: Electrolytes within normal limits  Glucose/Endocrine Profile: Glucose within normal limits ( mg/dL)         Goal Status: Some progress toward goal(s)    Time Spent (min): 30 minutes

## 2025-04-15 NOTE — PROGRESS NOTES
Pt is ready for dc to SNF.    1030 am- Requested follow up by precert team.  At present time , pt remains pending.    Will continue to follow to assist with dc needs.

## 2025-04-16 VITALS
BODY MASS INDEX: 20.26 KG/M2 | HEART RATE: 73 BPM | SYSTOLIC BLOOD PRESSURE: 129 MMHG | TEMPERATURE: 97.9 F | WEIGHT: 141.54 LBS | OXYGEN SATURATION: 95 % | DIASTOLIC BLOOD PRESSURE: 60 MMHG | HEIGHT: 70 IN | RESPIRATION RATE: 18 BRPM

## 2025-04-16 LAB
ANION GAP SERPL CALC-SCNC: 7 MMOL/L (ref 10–20)
BUN SERPL-MCNC: 23 MG/DL (ref 6–23)
CALCIUM SERPL-MCNC: 8.1 MG/DL (ref 8.6–10.3)
CHLORIDE SERPL-SCNC: 99 MMOL/L (ref 98–107)
CO2 SERPL-SCNC: 32 MMOL/L (ref 21–32)
CREAT SERPL-MCNC: 0.77 MG/DL (ref 0.5–1.3)
EGFRCR SERPLBLD CKD-EPI 2021: >90 ML/MIN/1.73M*2
ERYTHROCYTE [DISTWIDTH] IN BLOOD BY AUTOMATED COUNT: 12.1 % (ref 11.5–14.5)
GLUCOSE BLD MANUAL STRIP-MCNC: 143 MG/DL (ref 74–99)
GLUCOSE BLD MANUAL STRIP-MCNC: 292 MG/DL (ref 74–99)
GLUCOSE SERPL-MCNC: 182 MG/DL (ref 74–99)
HCT VFR BLD AUTO: 32.7 % (ref 41–52)
HGB BLD-MCNC: 11.2 G/DL (ref 13.5–17.5)
MCH RBC QN AUTO: 28.7 PG (ref 26–34)
MCHC RBC AUTO-ENTMCNC: 34.3 G/DL (ref 32–36)
MCV RBC AUTO: 84 FL (ref 80–100)
NRBC BLD-RTO: 0 /100 WBCS (ref 0–0)
PLATELET # BLD AUTO: 200 X10*3/UL (ref 150–450)
POTASSIUM SERPL-SCNC: 3.4 MMOL/L (ref 3.5–5.3)
RBC # BLD AUTO: 3.9 X10*6/UL (ref 4.5–5.9)
SODIUM SERPL-SCNC: 135 MMOL/L (ref 136–145)
WBC # BLD AUTO: 6.6 X10*3/UL (ref 4.4–11.3)

## 2025-04-16 PROCEDURE — 2500000001 HC RX 250 WO HCPCS SELF ADMINISTERED DRUGS (ALT 637 FOR MEDICARE OP): Performed by: STUDENT IN AN ORGANIZED HEALTH CARE EDUCATION/TRAINING PROGRAM

## 2025-04-16 PROCEDURE — 82947 ASSAY GLUCOSE BLOOD QUANT: CPT

## 2025-04-16 PROCEDURE — 97535 SELF CARE MNGMENT TRAINING: CPT | Mod: GO

## 2025-04-16 PROCEDURE — 80048 BASIC METABOLIC PNL TOTAL CA: CPT | Performed by: STUDENT IN AN ORGANIZED HEALTH CARE EDUCATION/TRAINING PROGRAM

## 2025-04-16 PROCEDURE — 36415 COLL VENOUS BLD VENIPUNCTURE: CPT | Performed by: STUDENT IN AN ORGANIZED HEALTH CARE EDUCATION/TRAINING PROGRAM

## 2025-04-16 PROCEDURE — 2500000002 HC RX 250 W HCPCS SELF ADMINISTERED DRUGS (ALT 637 FOR MEDICARE OP, ALT 636 FOR OP/ED): Performed by: STUDENT IN AN ORGANIZED HEALTH CARE EDUCATION/TRAINING PROGRAM

## 2025-04-16 PROCEDURE — 99239 HOSP IP/OBS DSCHRG MGMT >30: CPT | Performed by: INTERNAL MEDICINE

## 2025-04-16 PROCEDURE — 2500000004 HC RX 250 GENERAL PHARMACY W/ HCPCS (ALT 636 FOR OP/ED): Mod: JZ | Performed by: STUDENT IN AN ORGANIZED HEALTH CARE EDUCATION/TRAINING PROGRAM

## 2025-04-16 PROCEDURE — 2500000001 HC RX 250 WO HCPCS SELF ADMINISTERED DRUGS (ALT 637 FOR MEDICARE OP): Performed by: INTERNAL MEDICINE

## 2025-04-16 PROCEDURE — 85027 COMPLETE CBC AUTOMATED: CPT | Performed by: STUDENT IN AN ORGANIZED HEALTH CARE EDUCATION/TRAINING PROGRAM

## 2025-04-16 RX ADMIN — CLOPIDOGREL BISULFATE 75 MG: 75 TABLET, FILM COATED ORAL at 09:42

## 2025-04-16 RX ADMIN — GABAPENTIN 300 MG: 300 CAPSULE ORAL at 09:42

## 2025-04-16 RX ADMIN — INSULIN HUMAN 3 UNITS: 100 INJECTION, SOLUTION PARENTERAL at 11:27

## 2025-04-16 RX ADMIN — ASPIRIN 81 MG: 81 TABLET, CHEWABLE ORAL at 09:42

## 2025-04-16 RX ADMIN — LEVOTHYROXINE SODIUM 150 MCG: 75 TABLET ORAL at 06:24

## 2025-04-16 RX ADMIN — ENOXAPARIN SODIUM 40 MG: 40 INJECTION SUBCUTANEOUS at 09:43

## 2025-04-16 ASSESSMENT — COGNITIVE AND FUNCTIONAL STATUS - GENERAL
PERSONAL GROOMING: A LITTLE
HELP NEEDED FOR BATHING: A LITTLE
PERSONAL GROOMING: A LITTLE
EATING MEALS: A LITTLE
DAILY ACTIVITIY SCORE: 17
DRESSING REGULAR LOWER BODY CLOTHING: A LOT
WALKING IN HOSPITAL ROOM: A LOT
DRESSING REGULAR UPPER BODY CLOTHING: A LITTLE
HELP NEEDED FOR BATHING: A LOT
DAILY ACTIVITIY SCORE: 18
TOILETING: A LITTLE
DRESSING REGULAR LOWER BODY CLOTHING: A LITTLE
MOVING TO AND FROM BED TO CHAIR: A LITTLE
STANDING UP FROM CHAIR USING ARMS: A LITTLE
TOILETING: A LITTLE
DRESSING REGULAR UPPER BODY CLOTHING: A LITTLE
MOBILITY SCORE: 18
CLIMB 3 TO 5 STEPS WITH RAILING: A LOT

## 2025-04-16 ASSESSMENT — ACTIVITIES OF DAILY LIVING (ADL): HOME_MANAGEMENT_TIME_ENTRY: 14

## 2025-04-16 ASSESSMENT — PAIN SCALES - GENERAL: PAINLEVEL_OUTOF10: 0 - NO PAIN

## 2025-04-16 ASSESSMENT — PAIN - FUNCTIONAL ASSESSMENT: PAIN_FUNCTIONAL_ASSESSMENT: 0-10

## 2025-04-16 NOTE — NURSING NOTE
Patient Bed alarm and posey alarm are on and when patient got out of bed he proceeded to stand on his own and turned his alarms off.  This RN educated patient on the importance of the alarms and how if he turns it off I cannot keep him safe.  He said he understood but could not stand the noise.

## 2025-04-16 NOTE — PROGRESS NOTES
Occupational Therapy    OT Treatment    Patient Name: Jw Salinas  MRN: [id number]  Department: [facility]  Room: [facility]806-  Today's Date: 4/16/2025  Time Calculation  Start Time: 1000  Stop Time: 1014  Time Calculation (min): 14 min        Assessment:  OT Assessment: Session focused on increasing IND with functional mobility/ LB dressing. Pt tolerated session well and demo improved standing balance and activity tolerance this date.  Evaluation/Treatment Tolerance: Patient tolerated treatment well  Medical Staff Made Aware: Yes  End of Session Communication: Bedside nurse  End of Session Patient Position: Up in chair, Alarm on  Evaluation/Treatment Tolerance: Patient tolerated treatment well  Medical Staff Made Aware: Yes  Plan:  Treatment Interventions: ADL retraining, Functional transfer training, Endurance training, Compensatory technique education  OT Frequency: 2 times per week  OT Discharge Recommendations: Moderate intensity level of continued care  Equipment Recommended upon Discharge: Wheeled walker  OT Recommended Transfer Status: Assist of 1  OT - OK to Discharge: Yes (ok to d/c to next level of care once medically cleared)  Treatment Interventions: ADL retraining, Functional transfer training, Endurance training, Compensatory technique education    Subjective   Previous Visit Info:  OT Last Visit  OT Received On: 04/16/25  General:  General  Reason for Referral: ADL impairment  Referred By: Rocky  Past Medical History Relevant to Rehab: HTN, HLD, Dementia, DM  Family/Caregiver Present: No  Prior to Session Communication: Bedside nurse (cleared for therapy treatment)  Patient Position Received: Bed, 3 rail up, Alarm on  General Comment: Pt pleasant and agreeable to OT treatment.  Precautions:  Medical Precautions: Fall precautions      Pain:  Pain Assessment  Pain Assessment: 0-10  0-10 (Numeric) Pain Score: 0 - No pain    Objective    Cognition:  Cognition  Orientation Level: Disoriented to  time    Activities of Daily Living: Grooming  Grooming Comments: Pt politely declined grooming/hygiene tasks this date.    LE Dressing  LE Dressing:  (Pt required MOD A to thread pants while seated, then able to pull over hips in supported stance at FWW.)    Bed Mobility/Transfers: Bed Mobility  Bed Mobility:  (Transition from supine to sit EOB with SBA.)    Transfers  Transfer:  (x2 sit to/from stand using FWW with CGA, cues for hand placement.)      Functional Mobility:  Functional Mobility  Functional Mobility Performed:  (Pt completed functional mobility mod household distances using FWW with CGA for safety.)  Sitting Balance:  Static Sitting Balance  Static Sitting-Comment/Number of Minutes: Good  Dynamic Sitting Balance  Dynamic Sitting-Comments: Fair+  Standing Balance:  Static Standing Balance  Static Standing-Comment/Number of Minutes: Fair +  Dynamic Standing Balance  Dynamic Standing-Comments: Fair    Outcome Measures:[facility] Daily Activity  Putting on and taking off regular lower body clothing: A lot  Bathing (including washing, rinsing, drying): A lot  Putting on and taking off regular upper body clothing: A little  Toileting, which includes using toilet, bedpan or urinal: A little  Taking care of personal grooming such as brushing teeth: A little  Eating Meals: None  Daily Activity - Total Score: 17    Education Documentation  ADL Training, taught by Candy Galeano OT at 4/16/2025 12:10 PM.  Learner: Patient  Readiness: Acceptance  Method: Explanation, Demonstration  Response: Verbalizes Understanding, Demonstrated Understanding, Needs Reinforcement    Body Mechanics, taught by Candy Galeano OT at 4/16/2025 12:10 PM.  Learner: Patient  Readiness: Acceptance  Method: Explanation, Demonstration  Response: Verbalizes Understanding, Demonstrated Understanding, Needs Reinforcement    Goals:  Encounter Problems       Encounter Problems (Active)       OT Goals       Pt will complete all functional  transfers with SBA using FWW. (Progressing)       Start:  04/12/25    Expected End:  04/26/25            Pt will complete household distance functional mobility with SBA using FWW. (Met)       Start:  04/12/25    Expected End:  04/26/25    Resolved:  04/14/25    Updated to: Pt will complete household distance functional mobility with Supervision using FWW.    Update reason: status progression         Pt will improve dynamic standing balance to fair during functional tasks. (Met)       Start:  04/12/25    Expected End:  04/26/25    Resolved:  04/14/25    Updated to: Pt will improve dynamic standing balance to Good during functional tasks.    Update reason: status progressing          Pt will complete LB dressing with SBA. (Progressing)       Start:  04/12/25    Expected End:  04/26/25            Pt. will complete toileting at Mod I level  (Progressing)       Start:  04/14/25    Expected End:  04/26/25                Pt will improve dynamic standing balance to Good during functional tasks. (Progressing)       Start:  04/14/25    Expected End:  04/26/25                Pt will complete household distance functional mobility with Supervision using FWW. (Progressing)       Start:  04/14/25    Expected End:  04/26/25

## 2025-04-16 NOTE — DISCHARGE SUMMARY
Discharge Diagnosis  NSTEMI (non-ST elevated myocardial infarction) (Multi)  Syncope  LILIA  HTN  Dysphagia    Issues Requiring Follow-Up  Cardiology follow up for NSTEMI  Neurology follow up for possible NPH    Discharge Meds     Medication List      START taking these medications     aspirin 81 mg chewable tablet; Chew 1 tablet (81 mg) once daily.   clopidogrel 75 mg tablet; Commonly known as: Plavix; Take 1 tablet (75   mg) by mouth once daily for 364 doses.   metoprolol tartrate 25 mg tablet; Commonly known as: Lopressor; Take 1   tablet (25 mg) by mouth 2 times a day.     CHANGE how you take these medications     atorvastatin 40 mg tablet; Commonly known as: Lipitor; Take 1 tablet (40   mg) by mouth once daily at bedtime.; What changed: medication strength,   how much to take, when to take this     CONTINUE taking these medications     Basaglar KwikPen U-100 Insulin 100 unit/mL (3 mL) pen; Generic drug:   insulin glargine   insulin regular 100 unit/mL injection; Commonly known as: HumuLIN   R,NovoLIN R   levothyroxine 150 mcg tablet; Commonly known as: Synthroid, Levoxyl   lisinopril 10 mg tablet       Test Results Pending At Discharge  Pending Labs       No current pending labs.            Hospital Course   70-year-old male with past medical history of vascular dementia, hypertension, hyperlipidemia, type 1 diabetes, who presented to the emergency department with a syncopal event.  Initial labs were notable for significantly elevated troponin at 10,515.  He was started on a heparin drip for presumed NSTEMI and cardiology was consulted.  He underwent a left heart catheterization which showed mid LAD stenosis which was treated with SARAH x 2.  He tolerated the procedure well and has had no recurrent chest pain while in the hospital.  He was found to have significant dysphagia and failed a barium swallow.  This is apparently not new for the patient and has been discussed as far back as 2 years ago.  He has  consistently refused a feeding tube.  We extensively discussed the risks with patient and his son which they understand.  He is currently on a soft/bite-size solid with thin liquid diet.  He did have an acute kidney injury on presentation that improved with IV fluids.  Otherwise he was maintained on his usual medications.  He is intermittently confused related to his vascular dementia which is his baseline.  He remains hemodynamically stable today for discharge to skilled nursing [facility] for ongoing PT and OT to restore prior level of function.  Greater than 30-minute spent discharge activities.    Pertinent Physical Exam At Time of Discharge  Physical Exam  Constitutional:       Comments: Oriented to place and time, intermittently gets confused and agitated   HENT:      Head: Normocephalic.   Cardiovascular:      Rate and Rhythm: Normal rate and regular rhythm.   Pulmonary:      Effort: Pulmonary effort is normal. No respiratory distress.   Abdominal:      General: There is no distension.      Palpations: Abdomen is soft.   Musculoskeletal:         General: No deformity.   Neurological:      General: No focal deficit present.      Mental Status: He is alert.     Outpatient Follow-Up  No future appointments.      Ronen Zabala MD

## 2025-04-16 NOTE — PROGRESS NOTES
Wagner David has been obtained.  Pt. Will discharge this dtae to [facility] at 12:30 via ambulance.  Spoke with son, russell to inform he is aware and in agreement to transfer.

## 2025-04-17 LAB
ATRIAL RATE: 82 BPM
ATRIAL RATE: 83 BPM
P AXIS: 63 DEGREES
P AXIS: 76 DEGREES
P OFFSET: 185 MS
P OFFSET: 191 MS
P ONSET: 129 MS
P ONSET: 135 MS
PR INTERVAL: 162 MS
PR INTERVAL: 174 MS
Q ONSET: 216 MS
Q ONSET: 216 MS
QRS COUNT: 13 BEATS
QRS COUNT: 14 BEATS
QRS DURATION: 94 MS
QRS DURATION: 96 MS
QT INTERVAL: 390 MS
QT INTERVAL: 394 MS
QTC CALCULATION(BAZETT): 458 MS
QTC CALCULATION(BAZETT): 460 MS
QTC FREDERICIA: 434 MS
QTC FREDERICIA: 437 MS
R AXIS: 55 DEGREES
R AXIS: 92 DEGREES
T AXIS: 51 DEGREES
T AXIS: 72 DEGREES
T OFFSET: 411 MS
T OFFSET: 413 MS
VENTRICULAR RATE: 82 BPM
VENTRICULAR RATE: 83 BPM

## 2025-04-18 ENCOUNTER — NURSING HOME VISIT (OUTPATIENT)
Dept: POST ACUTE CARE | Facility: EXTERNAL LOCATION | Age: 70
End: 2025-04-18
Payer: MEDICARE

## 2025-04-18 DIAGNOSIS — I21.4 NSTEMI (NON-ST ELEVATED MYOCARDIAL INFARCTION) (MULTI): Primary | ICD-10-CM

## 2025-04-18 DIAGNOSIS — E10.40 CONTROLLED TYPE 1 DIABETES MELLITUS WITH DIABETIC NEUROPATHY, WITH LONG-TERM CURRENT USE OF INSULIN: ICD-10-CM

## 2025-04-18 DIAGNOSIS — R13.19 OTHER DYSPHAGIA: ICD-10-CM

## 2025-04-18 DIAGNOSIS — I10 ESSENTIAL HYPERTENSION: ICD-10-CM

## 2025-04-18 DIAGNOSIS — N17.9 AKI (ACUTE KIDNEY INJURY): ICD-10-CM

## 2025-04-18 DIAGNOSIS — D64.9 ANEMIA, UNSPECIFIED TYPE: ICD-10-CM

## 2025-04-18 DIAGNOSIS — E78.2 MIXED HYPERLIPIDEMIA: ICD-10-CM

## 2025-04-18 DIAGNOSIS — E03.9 ACQUIRED HYPOTHYROIDISM: ICD-10-CM

## 2025-04-18 PROCEDURE — 99309 SBSQ NF CARE MODERATE MDM 30: CPT

## 2025-04-18 NOTE — ASSESSMENT & PLAN NOTE
He failed his swallow study in the hospital and refused feeding tube. He will benefit from ST therapies while he is here for recovery. Current diet is soft/bite size with thin liquids.

## 2025-04-18 NOTE — LETTER
Patient: Jw Salinas  : 1955    Encounter Date: 2025    Visit  Note   Subjective   Jw Salinas is a 70 y.o. male who is being seen at [facility] and evaluated for multiple medical problems. Nursing notes, vital signs, and labs were reviewed in the local facility chart. Orders and medications were reviewed on the local chart.  No chief complaint on file.     This is a 70-year-old male with a past medical history of vascular dementia, hypertension, hyperlipidemia, type 1 diabetes, recently hospitalized for NSTEMI with a presenting troponin of 10,515.  He had a left heart cath that resulted in SARAH x 2 to the LAD.  His stay was complicated by significant dysphagia as he failed a barium swallow and adamantly refused feeding tubes.  His diet was resumed at soft/bite-size solid foods with thin liquid diet.  He was noted to have acute kidney injury as well which improved proved with IV fluids. no other complications during his stay and he was discharged to [facility] for rehabilitation.    On examination today, he denies any chest pains, shortness of breath, dizziness, abdominal pain, nausea or vomiting.  He states he is eating well and he has no concerns for medical team today.         Objective   There were no vitals taken for this visit.  Physical Exam  Vitals reviewed.   Constitutional:       Appearance: Normal appearance.   HENT:      Head: Normocephalic.   Cardiovascular:      Rate and Rhythm: Normal rate and regular rhythm.   Pulmonary:      Effort: Pulmonary effort is normal. No respiratory distress.      Breath sounds: Normal breath sounds. No wheezing, rhonchi or rales.   Abdominal:      General: Bowel sounds are normal.   Musculoskeletal:      Cervical back: Neck supple.      Right lower leg: No edema.      Left lower leg: No edema.   Skin:     General: Skin is warm and dry.   Neurological:      Mental Status: He is alert. Mental status is at baseline.   Psychiatric:          Mood and Affect: Mood normal.         Behavior: Behavior normal.         Assessment & Plan  NSTEMI (non-ST elevated myocardial infarction) (Multi)  This was his main reason for hospitalization. His troponin's were > 10,000 and he went for a left heart cath. 2 drug eluding stents were placed and he is recommended to continue with aspirin and plavix for embolism protection. His stay was complicated by LILIA and treated successfully with IVF. He had dysphagia while he was there as well and refused feeding tube placement. According to documentation, this is a chronic issue for him and his diet was adjusted to soft/bite sized with thin liquids.          Acquired hypothyroidism  Lab Results   Component Value Date    TSH 2.36 04/14/2025     Continue thyroid supplement at current dose he is at goal.          Controlled type 1 diabetes mellitus with diabetic neuropathy, with long-term current use of insulin  Plan for recheck A1C next week with labs; blood sugars varied here. He is currently managed with  Lantus 20 units Qhs  Regular insulin 10 units TID with meals         Essential hypertension  He's been mostly hypotensive here I reduced his lisinopril to 5 mg daily.        Mixed hyperlipidemia  Continue statin therapy         Other dysphagia  He failed his swallow study in the hospital and refused feeding tube. He will benefit from ST therapies while he is here for recovery. Current diet is soft/bite size with thin liquids.          LILIA (acute kidney injury)  Lab Results   Component Value Date    GLUCOSE 182 (H) 04/16/2025    CALCIUM 8.1 (L) 04/16/2025     (L) 04/16/2025    K 3.4 (L) 04/16/2025    CO2 32 04/16/2025    CL 99 04/16/2025    BUN 23 04/16/2025    CREATININE 0.77 04/16/2025     This resolved during hospitalization and we will recheck a BMP on Monday to ensure stability.        Anemia, unspecified type  Lab Results   Component Value Date    WBC 6.6 04/16/2025    HGB 11.2 (L) 04/16/2025    HCT 32.7 (L)  04/16/2025    MCV 84 04/16/2025     04/16/2025     This is mild; we will recheck his CBC level on Monday to confirm stability here            Time for coordination of care was greater than 35 minutes [facility] chart review, visit and exam, discussion with nursing, therapy and/or social service staff.       Please excuse any errors in grammar or translation related to this dictation. Voice recognition software was utilized to prepare this document.     Electronically Signed By: ROSE Zavaleta   4/18/25  5:29 PM

## 2025-04-18 NOTE — PROGRESS NOTES
Visit  Note   Subjective   Jw Salinas is a 70 y.o. male who is being seen at [facility] and evaluated for multiple medical problems. Nursing notes, vital signs, and labs were reviewed in the local facility chart. Orders and medications were reviewed on the local chart.  No chief complaint on file.     This is a 70-year-old male with a past medical history of vascular dementia, hypertension, hyperlipidemia, type 1 diabetes, recently hospitalized for NSTEMI with a presenting troponin of 10,515.  He had a left heart cath that resulted in SARAH x 2 to the LAD.  His stay was complicated by significant dysphagia as he failed a barium swallow and adamantly refused feeding tubes.  His diet was resumed at soft/bite-size solid foods with thin liquid diet.  He was noted to have acute kidney injury as well which improved proved with IV fluids. no other complications during his stay and he was discharged to [facility] for rehabilitation.    On examination today, he denies any chest pains, shortness of breath, dizziness, abdominal pain, nausea or vomiting.  He states he is eating well and he has no concerns for medical team today.         Objective   There were no vitals taken for this visit.  Physical Exam  Vitals reviewed.   Constitutional:       Appearance: Normal appearance.   HENT:      Head: Normocephalic.   Cardiovascular:      Rate and Rhythm: Normal rate and regular rhythm.   Pulmonary:      Effort: Pulmonary effort is normal. No respiratory distress.      Breath sounds: Normal breath sounds. No wheezing, rhonchi or rales.   Abdominal:      General: Bowel sounds are normal.   Musculoskeletal:      Cervical back: Neck supple.      Right lower leg: No edema.      Left lower leg: No edema.   Skin:     General: Skin is warm and dry.   Neurological:      Mental Status: He is alert. Mental status is at baseline.   Psychiatric:         Mood and Affect: Mood normal.         Behavior: Behavior  normal.         Assessment & Plan  NSTEMI (non-ST elevated myocardial infarction) (Multi)  This was his main reason for hospitalization. His troponin's were > 10,000 and he went for a left heart cath. 2 drug eluding stents were placed and he is recommended to continue with aspirin and plavix for embolism protection. His stay was complicated by LILIA and treated successfully with IVF. He had dysphagia while he was there as well and refused feeding tube placement. According to documentation, this is a chronic issue for him and his diet was adjusted to soft/bite sized with thin liquids.          Acquired hypothyroidism  Lab Results   Component Value Date    TSH 2.36 04/14/2025     Continue thyroid supplement at current dose he is at goal.          Controlled type 1 diabetes mellitus with diabetic neuropathy, with long-term current use of insulin  Plan for recheck A1C next week with labs; blood sugars varied here. He is currently managed with  Lantus 20 units Qhs  Regular insulin 10 units TID with meals         Essential hypertension  He's been mostly hypotensive here I reduced his lisinopril to 5 mg daily.        Mixed hyperlipidemia  Continue statin therapy         Other dysphagia  He failed his swallow study in the hospital and refused feeding tube. He will benefit from ST therapies while he is here for recovery. Current diet is soft/bite size with thin liquids.          LILIA (acute kidney injury)  Lab Results   Component Value Date    GLUCOSE 182 (H) 04/16/2025    CALCIUM 8.1 (L) 04/16/2025     (L) 04/16/2025    K 3.4 (L) 04/16/2025    CO2 32 04/16/2025    CL 99 04/16/2025    BUN 23 04/16/2025    CREATININE 0.77 04/16/2025     This resolved during hospitalization and we will recheck a BMP on Monday to ensure stability.        Anemia, unspecified type  Lab Results   Component Value Date    WBC 6.6 04/16/2025    HGB 11.2 (L) 04/16/2025    HCT 32.7 (L) 04/16/2025    MCV 84 04/16/2025     04/16/2025     This  is mild; we will recheck his CBC level on Monday to confirm stability here            Time for coordination of care was greater than 35 minutes [facility] chart review, visit and exam, discussion with nursing, therapy and/or social service staff.       Please excuse any errors in grammar or translation related to this dictation. Voice recognition software was utilized to prepare this document.

## 2025-04-18 NOTE — ASSESSMENT & PLAN NOTE
This was his main reason for hospitalization. His troponin's were > 10,000 and he went for a left heart cath. 2 drug eluding stents were placed and he is recommended to continue with aspirin and plavix for embolism protection. His stay was complicated by LILIA and treated successfully with IVF. He had dysphagia while he was there as well and refused feeding tube placement. According to documentation, this is a chronic issue for him and his diet was adjusted to soft/bite sized with thin liquids.

## 2025-04-18 NOTE — ASSESSMENT & PLAN NOTE
Lab Results   Component Value Date    TSH 2.36 04/14/2025     Continue thyroid supplement at current dose he is at goal.

## 2025-04-18 NOTE — ASSESSMENT & PLAN NOTE
Plan for recheck A1C next week with labs; blood sugars varied here. He is currently managed with  Lantus 20 units Qhs  Regular insulin 10 units TID with meals

## 2025-04-21 ENCOUNTER — NURSING HOME VISIT (OUTPATIENT)
Dept: POST ACUTE CARE | Facility: EXTERNAL LOCATION | Age: 70
End: 2025-04-21
Payer: MEDICARE

## 2025-04-21 DIAGNOSIS — E10.40 CONTROLLED TYPE 1 DIABETES MELLITUS WITH DIABETIC NEUROPATHY, WITH LONG-TERM CURRENT USE OF INSULIN: Primary | ICD-10-CM

## 2025-04-21 DIAGNOSIS — I10 ESSENTIAL HYPERTENSION: ICD-10-CM

## 2025-04-21 DIAGNOSIS — R13.19 OTHER DYSPHAGIA: ICD-10-CM

## 2025-04-21 PROCEDURE — 99309 SBSQ NF CARE MODERATE MDM 30: CPT

## 2025-04-21 NOTE — LETTER
Patient: Jw Salinas  : 1955    Encounter Date: 2025    Visit  Note   Subjective  Jw Salinas is a 70 y.o. male who is being seen at [facility] and evaluated for multiple medical problems. Nursing notes, vital signs, and labs were reviewed in the local facility chart. Orders and medications were reviewed on the local chart.  No chief complaint on file.     This is a 70 year old male evaluated today per request as his early morning glycemic level was in the 40s. He was reportedly asymptomatic and improved to 70s after intervention. On examination today, his family is in here with him and he is reporting no symptoms with low blood glucose numbers.          Objective  There were no vitals taken for this visit.  Physical Exam  Vitals reviewed.   Constitutional:       Appearance: Normal appearance.   HENT:      Head: Normocephalic.   Cardiovascular:      Rate and Rhythm: Normal rate and regular rhythm.   Pulmonary:      Effort: Pulmonary effort is normal.      Breath sounds: Normal breath sounds.   Musculoskeletal:      Cervical back: Neck supple.   Skin:     General: Skin is warm and dry.   Neurological:      Mental Status: He is alert.         Assessment & Plan  Controlled type 1 diabetes mellitus with diabetic neuropathy, with long-term current use of insulin  A1c pending; blood sugars varied here with this mornings episode of hypoglycemia. He is currently managed with  Insulin glargine Reduced to 5u Q12 today  Regular insulin 10 units TID with meals    Reviewed with him and his son today; he does not see endocrinology outpatient; states he sees Dr. Jaramillo only for management of his diabetes. I adjusted his lantus based on endocrinology's diagnosis of type 1 diabetes from a previous hospitalization.            Essential hypertension  Bp stable last check 118/66 mmHg         Other dysphagia  He failed his swallow study in the hospital and refused feeding tube. He will benefit from   therapies while he is here for recovery. Current diet is soft/bite size with thin liquids. Continue to monitor for s/s of aspiration            Time for coordination of care was greater than 35 minutes [facility] chart review, visit and exam, discussion with nursing, therapy and/or social service staff.       Please excuse any errors in grammar or translation related to this dictation. Voice recognition software was utilized to prepare this document.     Electronically Signed By: ROSE Zavaleta   4/21/25  3:42 PM

## 2025-04-21 NOTE — ASSESSMENT & PLAN NOTE
He failed his swallow study in the hospital and refused feeding tube. He will benefit from ST therapies while he is here for recovery. Current diet is soft/bite size with thin liquids. Continue to monitor for s/s of aspiration

## 2025-04-21 NOTE — PROGRESS NOTES
Visit  Note   Subjective   Jw Salinas is a 70 y.o. male who is being seen at [facility] and evaluated for multiple medical problems. Nursing notes, vital signs, and labs were reviewed in the local facility chart. Orders and medications were reviewed on the local chart.  No chief complaint on file.     This is a 70 year old male evaluated today per request as his early morning glycemic level was in the 40s. He was reportedly asymptomatic and improved to 70s after intervention. On examination today, his family is in here with him and he is reporting no symptoms with low blood glucose numbers.          Objective   There were no vitals taken for this visit.  Physical Exam  Vitals reviewed.   Constitutional:       Appearance: Normal appearance.   HENT:      Head: Normocephalic.   Cardiovascular:      Rate and Rhythm: Normal rate and regular rhythm.   Pulmonary:      Effort: Pulmonary effort is normal.      Breath sounds: Normal breath sounds.   Musculoskeletal:      Cervical back: Neck supple.   Skin:     General: Skin is warm and dry.   Neurological:      Mental Status: He is alert.         Assessment & Plan  Controlled type 1 diabetes mellitus with diabetic neuropathy, with long-term current use of insulin  A1c pending; blood sugars varied here with this mornings episode of hypoglycemia. He is currently managed with  Insulin glargine Reduced to 5u Q12 today  Regular insulin 10 units TID with meals    Reviewed with him and his son today; he does not see endocrinology outpatient; states he sees Dr. Jaramillo only for management of his diabetes. I adjusted his lantus based on endocrinology's diagnosis of type 1 diabetes from a previous hospitalization.            Essential hypertension  Bp stable last check 118/66 mmHg         Other dysphagia  He failed his swallow study in the hospital and refused feeding tube. He will benefit from ST therapies while he is here for recovery. Current diet is soft/bite size  with thin liquids. Continue to monitor for s/s of aspiration            Time for coordination of care was greater than 35 minutes [facility] chart review, visit and exam, discussion with nursing, therapy and/or social service staff.       Please excuse any errors in grammar or translation related to this dictation. Voice recognition software was utilized to prepare this document.

## 2025-04-21 NOTE — ASSESSMENT & PLAN NOTE
A1c pending; blood sugars varied here with this mornings episode of hypoglycemia. He is currently managed with  Insulin glargine Reduced to 5u Q12 today  Regular insulin 10 units TID with meals    Reviewed with him and his son today; he does not see endocrinology outpatient; states he sees Dr. Jaramillo only for management of his diabetes. I adjusted his lantus based on endocrinology's diagnosis of type 1 diabetes from a previous hospitalization.

## 2025-04-22 ENCOUNTER — NURSING HOME VISIT (OUTPATIENT)
Dept: POST ACUTE CARE | Facility: EXTERNAL LOCATION | Age: 70
End: 2025-04-22
Payer: MEDICARE

## 2025-04-22 DIAGNOSIS — I25.10 CORONARY ARTERY DISEASE INVOLVING NATIVE CORONARY ARTERY OF NATIVE HEART WITHOUT ANGINA PECTORIS: Primary | ICD-10-CM

## 2025-04-22 DIAGNOSIS — R13.19 OTHER DYSPHAGIA: ICD-10-CM

## 2025-04-22 DIAGNOSIS — E10.40 CONTROLLED TYPE 1 DIABETES MELLITUS WITH DIABETIC NEUROPATHY, WITH LONG-TERM CURRENT USE OF INSULIN: ICD-10-CM

## 2025-04-22 DIAGNOSIS — I10 ESSENTIAL HYPERTENSION: ICD-10-CM

## 2025-04-22 DIAGNOSIS — E03.9 ACQUIRED HYPOTHYROIDISM: ICD-10-CM

## 2025-04-22 PROCEDURE — 99305 1ST NF CARE MODERATE MDM 35: CPT | Performed by: FAMILY MEDICINE

## 2025-04-22 NOTE — ASSESSMENT & PLAN NOTE
He failed his swallow study in the hospital and declined a feeding tube. He will benefit from ST therapies while he is here for recovery. Current diet is soft/bite size with thin liquids. Continue to monitor for s/s of aspiration

## 2025-04-22 NOTE — ASSESSMENT & PLAN NOTE
He is now status post 2 drug-eluting stents which were placed successfully and have relieved all of his symptoms of angina.  We will continue with secondary prevention with Plavix and aspirin and high intensity statin.  His acute recovery was complicated by acute kidney injury which is now resolved and dysphagia which is reportedly resolved as well.  We will continue a soft bite sized diet with thin liquids.  He plans to return home where he lives independently as soon as he is able

## 2025-04-22 NOTE — ASSESSMENT & PLAN NOTE
He is treated with insulin for his entire life.  He had some severe hypoglycemia on admission here and so his routine twice daily insulin was reduced.  We will continue sliding scale insulin as well and monitor closely

## 2025-04-22 NOTE — PROGRESS NOTES
Visit  Note   Subjective   Jw Salinas is a 70 y.o. male who is being seen at [facility] and evaluated for multiple medical problems. Nursing notes, vital signs, and labs were reviewed in the local facility chart. Orders and medications were reviewed on the local chart.  No chief complaint on file.     HPI   This 70-year-old male was treated for an MRI with an elevated troponin.  He had a left heart catheterization resulting in a SARAH x 2 to the LAD artery.  He was given the usual secondary prophylactic treatment and recovered well.  He initially failed a barium swallow but refused feeding tube and resumed a regular diet.  He has not had any difficulty since then and is eating a regular mechanical soft diet here without difficulty.  He also was treated for acute kidney injury which improved with IV fluids.  He comes to this facility for rehabilitation and physical therapy prior to returning home where he lives alone.  He did have severe hypoglycemia and his routine our insulin was decreased slightly yesterday.  Objective   There were no vitals taken for this visit.  Physical Exam  Constitutional:       Appearance: Normal appearance.   HENT:      Head: Normocephalic.   Eyes:      Conjunctiva/sclera: Conjunctivae normal.   Cardiovascular:      Rate and Rhythm: Normal rate and regular rhythm.      Heart sounds: Normal heart sounds.   Pulmonary:      Effort: Pulmonary effort is normal.      Breath sounds: Normal breath sounds.   Musculoskeletal:      Cervical back: Neck supple.   Skin:     General: Skin is warm and dry.   Neurological:      Mental Status: He is alert.       Assessment/Plan   Assessment & Plan  Coronary artery disease involving native coronary artery of native heart without angina pectoris  He is now status post 2 drug-eluting stents which were placed successfully and have relieved all of his symptoms of angina.  We will continue with secondary prevention with Plavix and aspirin and high  intensity statin.  His acute recovery was complicated by acute kidney injury which is now resolved and dysphagia which is reportedly resolved as well.  We will continue a soft bite sized diet with thin liquids.  He plans to return home where he lives independently as soon as he is able         Controlled type 1 diabetes mellitus with diabetic neuropathy, with long-term current use of insulin  He is treated with insulin for his entire life.  He had some severe hypoglycemia on admission here and so his routine twice daily insulin was reduced.  We will continue sliding scale insulin as well and monitor closely         Acquired hypothyroidism  Lab Results   Component Value Date    TSH 2.36 04/14/2025     Continue thyroid supplement at current dose he is at goal.            Essential hypertension  Bp stable last check 118/66 mmHg           Other dysphagia  He failed his swallow study in the hospital and declined a feeding tube. He will benefit from ST therapies while he is here for recovery. Current diet is soft/bite size with thin liquids. Continue to monitor for s/s of aspiration                  Please excuse any errors in grammar or translation related to this dictation. Voice recognition software was utilized to prepare this document.

## 2025-04-22 NOTE — LETTER
Patient: Jw Salinas  : 1955    Encounter Date: 2025    Visit  Note   Subjective  Jw Salinas is a 70 y.o. male who is being seen at [facility] and evaluated for multiple medical problems. Nursing notes, vital signs, and labs were reviewed in the local facility chart. Orders and medications were reviewed on the local chart.  No chief complaint on file.     HPI   This 70-year-old male was treated for an MRI with an elevated troponin.  He had a left heart catheterization resulting in a SARAH x 2 to the LAD artery.  He was given the usual secondary prophylactic treatment and recovered well.  He initially failed a barium swallow but refused feeding tube and resumed a regular diet.  He has not had any difficulty since then and is eating a regular mechanical soft diet here without difficulty.  He also was treated for acute kidney injury which improved with IV fluids.  He comes to this facility for rehabilitation and physical therapy prior to returning home where he lives alone.  He did have severe hypoglycemia and his routine our insulin was decreased slightly yesterday.  Objective  There were no vitals taken for this visit.  Physical Exam  Constitutional:       Appearance: Normal appearance.   HENT:      Head: Normocephalic.   Eyes:      Conjunctiva/sclera: Conjunctivae normal.   Cardiovascular:      Rate and Rhythm: Normal rate and regular rhythm.      Heart sounds: Normal heart sounds.   Pulmonary:      Effort: Pulmonary effort is normal.      Breath sounds: Normal breath sounds.   Musculoskeletal:      Cervical back: Neck supple.   Skin:     General: Skin is warm and dry.   Neurological:      Mental Status: He is alert.       Assessment/Plan  Assessment & Plan  Coronary artery disease involving native coronary artery of native heart without angina pectoris  He is now status post 2 drug-eluting stents which were placed successfully and have relieved all of his symptoms of angina.  We will  continue with secondary prevention with Plavix and aspirin and high intensity statin.  His acute recovery was complicated by acute kidney injury which is now resolved and dysphagia which is reportedly resolved as well.  We will continue a soft bite sized diet with thin liquids.  He plans to return home where he lives independently as soon as he is able         Controlled type 1 diabetes mellitus with diabetic neuropathy, with long-term current use of insulin  He is treated with insulin for his entire life.  He had some severe hypoglycemia on admission here and so his routine twice daily insulin was reduced.  We will continue sliding scale insulin as well and monitor closely         Acquired hypothyroidism  Lab Results   Component Value Date    TSH 2.36 04/14/2025     Continue thyroid supplement at current dose he is at goal.            Essential hypertension  Bp stable last check 118/66 mmHg           Other dysphagia  He failed his swallow study in the hospital and declined a feeding tube. He will benefit from ST therapies while he is here for recovery. Current diet is soft/bite size with thin liquids. Continue to monitor for s/s of aspiration                  Please excuse any errors in grammar or translation related to this dictation. Voice recognition software was utilized to prepare this document.     Electronically Signed By: Arnold Hsu MD   4/22/25  8:46 AM

## 2025-04-24 ENCOUNTER — NURSING HOME VISIT (OUTPATIENT)
Dept: POST ACUTE CARE | Facility: EXTERNAL LOCATION | Age: 70
End: 2025-04-24
Payer: MEDICARE

## 2025-04-24 DIAGNOSIS — I10 ESSENTIAL HYPERTENSION: ICD-10-CM

## 2025-04-24 DIAGNOSIS — E10.65 UNCONTROLLED TYPE 1 DIABETES MELLITUS WITH HYPERGLYCEMIA: Primary | ICD-10-CM

## 2025-04-24 PROCEDURE — 99309 SBSQ NF CARE MODERATE MDM 30: CPT

## 2025-04-24 NOTE — ASSESSMENT & PLAN NOTE
On admission, he had severe hypoglycemia requiring his long acting insulin to be reduced to 5u BID. He continues with his 10 units with meals regularly. Today, we discussed what he is eating and how to improve this; first, he needs to switch back to splenda and he agreed to do this. Next, we will plan for an extra 2 units of fast acting insulin with lunch if his blood glucose is greater than 300 due to it being so high; he is brittle so we will plan for a recheck in 2 hours if this is required to be given. On review with staff here, he is eating frequently and consuming sweets. He's advised to restrict the cakes and make an attempt to consume the sugar free options here. His memory impairment makes this difficult to manage as well. We will continue to adjust his insulins as warranted. We rechecked his A1C here and his current level is 11.1% which indicates his diabetes has been poorly controlled.

## 2025-04-24 NOTE — PROGRESS NOTES
Visit  Note   Subjective   Jw Salinas is a 70 y.o. male who is being seen at [facility] and evaluated for multiple medical problems. Nursing notes, vital signs, and labs were reviewed in the local facility chart. Orders and medications were reviewed on the local chart.  No chief complaint on file.     This is a 70 year old male evaluated today for abnormal blood glucose findings this morning for hyperglycemia greater than 400. On examination and conversation with the patient, he is reporting he has been diabetic since he's been 12 years old and he states he knows it is related to carbohydrates as to why his blood sugar is high. We had a conversation regarding the regular sugar packets in his room and he told me that is what they have been bringing him so he uses it. He states he uses splenda at home so we agreed to change this today. He currently is denying any chest pains, polydypsia, polyuria, or dizziness.          Objective   There were no vitals taken for this visit.  Physical Exam  Vitals reviewed.   Constitutional:       Appearance: Normal appearance.   HENT:      Head: Normocephalic.   Cardiovascular:      Rate and Rhythm: Normal rate and regular rhythm.   Pulmonary:      Breath sounds: Normal breath sounds.   Musculoskeletal:      Cervical back: Neck supple.   Skin:     General: Skin is warm and dry.   Neurological:      Mental Status: He is alert.         Assessment & Plan  Uncontrolled type 1 diabetes mellitus with hyperglycemia  On admission, he had severe hypoglycemia requiring his long acting insulin to be reduced to 5u BID. He continues with his 10 units with meals regularly. Today, we discussed what he is eating and how to improve this; first, he needs to switch back to splenda and he agreed to do this. Next, we will plan for an extra 2 units of fast acting insulin with lunch if his blood glucose is greater than 300 due to it being so high; he is brittle so we will plan for a recheck  in 2 hours if this is required to be given. On review with staff here, he is eating frequently and consuming sweets. He's advised to restrict the cakes and make an attempt to consume the sugar free options here. His memory impairment makes this difficult to manage as well. We will continue to adjust his insulins as warranted. We rechecked his A1C here and his current level is 11.1% which indicates his diabetes has been poorly controlled.          Essential hypertension  Bp stable                 Please excuse any errors in grammar or translation related to this dictation. Voice recognition software was utilized to prepare this document.

## 2025-04-24 NOTE — LETTER
Patient: Jw Salinas  : 1955    Encounter Date: 2025    Visit  Note   Subjective   Jw Salinas is a 70 y.o. male who is being seen at [facility] and evaluated for multiple medical problems. Nursing notes, vital signs, and labs were reviewed in the local facility chart. Orders and medications were reviewed on the local chart.  No chief complaint on file.     This is a 70 year old male evaluated today for abnormal blood glucose findings this morning for hyperglycemia greater than 400. On examination and conversation with the patient, he is reporting he has been diabetic since he's been 12 years old and he states he knows it is related to carbohydrates as to why his blood sugar is high. We had a conversation regarding the regular sugar packets in his room and he told me that is what they have been bringing him so he uses it. He states he uses splenda at home so we agreed to change this today. He currently is denying any chest pains, polydypsia, polyuria, or dizziness.          Objective   There were no vitals taken for this visit.  Physical Exam  Vitals reviewed.   Constitutional:       Appearance: Normal appearance.   HENT:      Head: Normocephalic.   Cardiovascular:      Rate and Rhythm: Normal rate and regular rhythm.   Pulmonary:      Breath sounds: Normal breath sounds.   Musculoskeletal:      Cervical back: Neck supple.   Skin:     General: Skin is warm and dry.   Neurological:      Mental Status: He is alert.         Assessment & Plan  Uncontrolled type 1 diabetes mellitus with hyperglycemia  On admission, he had severe hypoglycemia requiring his long acting insulin to be reduced to 5u BID. He continues with his 10 units with meals regularly. Today, we discussed what he is eating and how to improve this; first, he needs to switch back to splenda and he agreed to do this. Next, we will plan for an extra 2 units of fast acting insulin with lunch if his blood glucose is greater than 300  due to it being so high; he is brittle so we will plan for a recheck in 2 hours if this is required to be given. On review with staff here, he is eating frequently and consuming sweets. He's advised to restrict the cakes and make an attempt to consume the sugar free options here. His memory impairment makes this difficult to manage as well. We will continue to adjust his insulins as warranted. We rechecked his A1C here and his current level is 11.1% which indicates his diabetes has been poorly controlled.          Essential hypertension  Bp stable                 Please excuse any errors in grammar or translation related to this dictation. Voice recognition software was utilized to prepare this document.     Electronically Signed By: ORSE Zavaleta   4/24/25  2:19 PM

## 2025-05-02 ENCOUNTER — TELEPHONE (OUTPATIENT)
Age: 70
End: 2025-05-02

## 2025-05-02 NOTE — TELEPHONE ENCOUNTER
Called pt son, Octaviano back to cancel appt.    Appt cancelled with Octaviano.    Aware to call us if they need anything in the future.

## 2025-05-02 NOTE — TELEPHONE ENCOUNTER
Called [facility] to verify who the referring provider is for the referral.    They said that the order was supposed to go to Dr Campuzano and for us to ignore this order/referral.

## 2025-05-02 NOTE — TELEPHONE ENCOUNTER
Received an order to be signed by Dr Reid.   Called pt son to schedule an appt with Dr Reid since pt was never seen in our clinic before.    Scheduled appt for 5/29/25 with Dr Holt in [place].    Order/ Referral scanned into chart.

## 2025-05-27 ENCOUNTER — TELEPHONE (OUTPATIENT)
Age: 70
End: 2025-05-27

## 2025-05-27 NOTE — TELEPHONE ENCOUNTER
Fany from [facility] home speech therapy called to let us know that the pt took a fall yesterday morning around 7am. She states that the pt was leaning forward and lost his balance and fell. Pt grazed his head on the ground, pt was not bleeding. Pt states that he felt fine no reported symptoms. Pts bp was 140/74 hr 81. Please advise

## 2025-06-02 NOTE — TELEPHONE ENCOUNTER
Attempted to call patient to notify him of Maja's response. No answer, voicemail left for patient to call back.

## 2025-06-03 NOTE — TELEPHONE ENCOUNTER
Called and spoke to patient's son. Per patient's son Octaviano, Herminio had an appointment with his PCP this morning and was checked out and got lab work drawn.

## (undated) DEVICE — CATHETER, DIAGNOSTIC, 5FR,  PIG-145, 110CM, 6SH ANGLED

## (undated) DEVICE — DEVICE, INFLATION, BASIXSKY, 30ATM BAR 20ML, MAP802 PHD, INSERT TOOL TORQUE, METAL

## (undated) DEVICE — TUBING, MANIFOLD, LOW PRESSURE

## (undated) DEVICE — CATHETER, GUIDING, LAUNCHER, 6FR EBU 3.5

## (undated) DEVICE — Device

## (undated) DEVICE — SHEATH, GLIDESHEATH, SLENDER, 6FR 10CM

## (undated) DEVICE — CATHETER, DIAGNOSTIC, JUDKINS, LEFT, 5 FR-JL 3.5

## (undated) DEVICE — GUIDEWIRE, UNIVERSAL BALANCE MID WEIGHT, 190CM, J-TIP

## (undated) DEVICE — CATHETER, ANGIO, INFINITI, AR II MOD, 5 FR X 100 CM

## (undated) DEVICE — CATHETER, BALLOON, NC EUPHORA NONCOMPLIANT 3.0 X 27 X 142CM

## (undated) DEVICE — BAND, VASCULAR, RADIAL HEMOSTAT, REGULAR 24CM